# Patient Record
Sex: MALE | Race: WHITE | NOT HISPANIC OR LATINO | Employment: OTHER | URBAN - METROPOLITAN AREA
[De-identification: names, ages, dates, MRNs, and addresses within clinical notes are randomized per-mention and may not be internally consistent; named-entity substitution may affect disease eponyms.]

---

## 2019-02-05 ENCOUNTER — OFFICE VISIT (OUTPATIENT)
Dept: FAMILY MEDICINE CLINIC | Facility: HOSPITAL | Age: 64
End: 2019-02-05
Payer: MEDICARE

## 2019-02-05 VITALS
HEART RATE: 85 BPM | DIASTOLIC BLOOD PRESSURE: 80 MMHG | OXYGEN SATURATION: 95 % | TEMPERATURE: 97.7 F | WEIGHT: 186 LBS | SYSTOLIC BLOOD PRESSURE: 112 MMHG

## 2019-02-05 DIAGNOSIS — F20.9 SCHIZOPHRENIA, UNSPECIFIED TYPE (HCC): ICD-10-CM

## 2019-02-05 DIAGNOSIS — J44.9 CHRONIC OBSTRUCTIVE PULMONARY DISEASE, UNSPECIFIED COPD TYPE (HCC): ICD-10-CM

## 2019-02-05 DIAGNOSIS — H91.93 BILATERAL HEARING LOSS, UNSPECIFIED HEARING LOSS TYPE: Primary | ICD-10-CM

## 2019-02-05 DIAGNOSIS — F10.11 HISTORY OF ALCOHOL ABUSE: ICD-10-CM

## 2019-02-05 DIAGNOSIS — E11.9 TYPE 2 DIABETES MELLITUS WITHOUT COMPLICATION, WITHOUT LONG-TERM CURRENT USE OF INSULIN (HCC): ICD-10-CM

## 2019-02-05 PROCEDURE — 99203 OFFICE O/P NEW LOW 30 MIN: CPT | Performed by: PHYSICIAN ASSISTANT

## 2019-02-05 RX ORDER — VITAMIN B COMPLEX
1 CAPSULE ORAL DAILY
Qty: 30 CAPSULE | Refills: 6 | Status: SHIPPED | OUTPATIENT
Start: 2019-02-05 | End: 2022-03-09 | Stop reason: SDUPTHER

## 2019-02-05 RX ORDER — IBUPROFEN 200 MG
TABLET ORAL EVERY 6 HOURS PRN
COMMUNITY
End: 2022-08-05

## 2019-02-05 RX ORDER — OMEGA-3-ACID ETHYL ESTERS 1 G/1
2 CAPSULE, LIQUID FILLED ORAL DAILY
COMMUNITY

## 2019-02-05 RX ORDER — FOLIC ACID 1 MG/1
1 TABLET ORAL DAILY
Qty: 30 TABLET | Refills: 6 | Status: SHIPPED | OUTPATIENT
Start: 2019-02-05

## 2019-02-05 NOTE — PROGRESS NOTES
Assessment/Plan:         Diagnoses and all orders for this visit:    COPD  Schizophrenia  DM type 2    Bilateral hearing loss, unspecified hearing loss type  -     Ambulatory Referral to Otolaryngology; Future    History of alcohol abuse  -     b complex vitamins capsule; Take 1 capsule by mouth daily  -     folic acid (FOLVITE) 1 mg tablet; Take 1 tablet (1 mg total) by mouth daily    Other orders  -     metFORMIN (GLUCOPHAGE) 500 mg tablet; Take 500 mg by mouth 2 (two) times a day with meals  -     ibuprofen (MOTRIN) 200 mg tablet; Take by mouth every 6 (six) hours as needed for mild pain  -     omega-3-acid ethyl esters (LOVAZA) 1 g capsule; Take 2 g by mouth daily        Subjective:      Patient ID: Torin Mcdaniel is a 59 y o  male  59year old white male presents to get established  Was living at the [place], [place]  Has several forms to be filled out for [facility] group home  Has a hearing problem, right ear  Admits to smoking 1 pack a day  Stopped alcohol use age 33, started around age 16, 1 was in [employer] for 2 months and 2 weeks, was discharged due to mental disorder  Used to go to [place] [address], plans to go back  Presents with caretaker  Review of Systems   Constitutional: Negative for chills, diaphoresis, fatigue and fever  HENT: Positive for congestion, hearing loss and rhinorrhea  Negative for ear pain  Respiratory: Positive for cough and shortness of breath  Musculoskeletal: Positive for arthralgias and back pain  Negative for myalgias, neck pain and neck stiffness  Neurological: Positive for dizziness, light-headedness and headaches  Negative for numbness  Psychiatric/Behavioral: Positive for agitation, decreased concentration and sleep disturbance  The patient is nervous/anxious            Objective:      /80   Pulse 85   Temp 97 7 °F (36 5 °C)   Wt 84 4 kg (186 lb)   SpO2 95%          Physical Exam   Constitutional: He is oriented to person, place, and time  He appears well-developed and well-nourished  No distress  HENT:   Head: Normocephalic and atraumatic  Eyes: Conjunctivae and EOM are normal  Right eye exhibits no discharge  Left eye exhibits no discharge  No scleral icterus  Cardiovascular: Normal rate, regular rhythm and normal heart sounds  Pulmonary/Chest: Effort normal and breath sounds normal  No respiratory distress  He has no wheezes  He has no rales  Musculoskeletal: Normal range of motion  He exhibits no edema  Neurological: He is alert and oriented to person, place, and time  Skin: He is not diaphoretic  Psychiatric: He has a normal mood and affect  His behavior is normal  Judgment and thought content normal    Nursing note and vitals reviewed

## 2019-02-05 NOTE — PATIENT INSTRUCTIONS
Recommend Folic acid, and vitamin B complex, due to hx  Of alcohol abuse  Urged patient to stop smoking  Continue all regular medications, and for staff to send us recent blood work results  Filled out several forms for group home

## 2019-03-15 ENCOUNTER — APPOINTMENT (EMERGENCY)
Dept: RADIOLOGY | Facility: HOSPITAL | Age: 64
End: 2019-03-15
Payer: MEDICARE

## 2019-03-15 ENCOUNTER — HOSPITAL ENCOUNTER (EMERGENCY)
Facility: HOSPITAL | Age: 64
Discharge: HOME | End: 2019-03-15
Attending: EMERGENCY MEDICINE | Admitting: EMERGENCY MEDICINE
Payer: MEDICARE

## 2019-03-15 VITALS
BODY MASS INDEX: 20.32 KG/M2 | HEIGHT: 72 IN | DIASTOLIC BLOOD PRESSURE: 77 MMHG | HEART RATE: 84 BPM | RESPIRATION RATE: 24 BRPM | WEIGHT: 150 LBS | OXYGEN SATURATION: 97 % | TEMPERATURE: 96.7 F | SYSTOLIC BLOOD PRESSURE: 133 MMHG

## 2019-03-15 DIAGNOSIS — S80.11XA CONTUSION OF RIGHT LOWER LEG, INITIAL ENCOUNTER: Primary | ICD-10-CM

## 2019-03-15 PROCEDURE — 99283 EMERGENCY DEPT VISIT LOW MDM: CPT

## 2019-03-15 PROCEDURE — 73590 X-RAY EXAM OF LOWER LEG: CPT

## 2019-03-15 PROCEDURE — 73610 X-RAY EXAM OF ANKLE: CPT

## 2019-03-15 RX ORDER — BUPROPION HYDROCHLORIDE 150 MG/1
TABLET, EXTENDED RELEASE ORAL
COMMUNITY

## 2019-03-15 RX ORDER — SERTRALINE HYDROCHLORIDE 100 MG/1
TABLET, FILM COATED ORAL
COMMUNITY

## 2019-03-15 RX ORDER — MAG HYDROX/ALUMINUM HYD/SIMETH 400-400-40
SUSPENSION, ORAL (FINAL DOSE FORM) ORAL
COMMUNITY

## 2019-03-15 NOTE — ED PROVIDER NOTES
History  Chief Complaint   Patient presents with    Leg Injury     Patient states he was running at the [employer] and hit his R foot  R lower extremity swollen and ecchymotic  58 yo male w/ hx of schizophrenia, NIDDM, COPD and HLD presents to the Emergency Department for evaluation of R leg injury suffered "a few days ago"  Pt states that he was running around with his family, "playing army" when he ran into a [employer] table  He has developed progressively worsening swelling and bruising since the injury  Denies significant pain at rest, states "you can't fix black and blue"  Able to ambulate with pain, 7/10, achey in nature  No meds taken for pain control  Prior to Admission Medications   Prescriptions Last Dose Informant Patient Reported? Taking?    ASPIRIN 81 PO   Yes Yes   Sig: Take by mouth   Cholecalciferol (VITAMIN D3) 5000 units CAPS   Yes Yes   Sig: Take by mouth   b complex vitamins capsule   No Yes   Sig: Take 1 capsule by mouth daily   buPROPion (WELLBUTRIN SR) 150 mg 12 hr tablet   Yes Yes   Sig: Take 450 mg by mouth 2 (two) times a day   folic acid (FOLVITE) 1 mg tablet   No Yes   Sig: Take 1 tablet (1 mg total) by mouth daily   ibuprofen (MOTRIN) 200 mg tablet   Yes Yes   Sig: Take by mouth every 6 (six) hours as needed for mild pain   lurasidone (LATUDA) 80 mg tablet   Yes Yes   Sig: Take 160 mg by mouth daily with breakfast   metFORMIN (GLUCOPHAGE) 500 mg tablet   Yes Yes   Sig: Take 500 mg by mouth 2 (two) times a day with meals   omega-3-acid ethyl esters (LOVAZA) 1 g capsule   Yes Yes   Sig: Take 2 g by mouth daily   sertraline (ZOLOFT) 100 mg tablet   Yes Yes   Sig: Take 50 mg by mouth daily      Facility-Administered Medications: None       Past Medical History:   Diagnosis Date    Atopic rhinitis     BPH with urinary obstruction     COPD (chronic obstructive pulmonary disease) (HCC)     Diabetes mellitus (HCC)     Hyperlipidemia     Incomplete bladder emptying     Schizophrenia ([facility] [address] )        History reviewed  No pertinent surgical history  History reviewed  No pertinent family history  I have reviewed and agree with the history as documented  Social History     Tobacco Use    Smoking status: Current Every Day Smoker     Packs/day: 1 00     Years: 45 00     Pack years: 45 00    Smokeless tobacco: Never Used   Substance Use Topics    Alcohol use: No     Comment: hx  of hard liquor, regular use, quit at age 33    [place] Drug use: No        Review of Systems   Constitutional: Negative for fever  Respiratory: Negative for shortness of breath  Cardiovascular: Negative for chest pain  Musculoskeletal: Positive for myalgias (RLE)  Negative for arthralgias, gait problem and joint swelling  Skin: Negative for color change and wound  Neurological: Negative for weakness and numbness  Hematological: Does not bruise/bleed easily  Physical Exam  Physical Exam   Constitutional: He is oriented to person, place, and time  He appears well-developed and well-nourished  No distress  HENT:   Head: Normocephalic and atraumatic  Eyes: Pupils are equal, round, and reactive to light  No scleral icterus  Neck: No JVD present  Cardiovascular: Normal rate and regular rhythm  Exam reveals no gallop and no friction rub  No murmur heard  Pulses:       Dorsalis pedis pulses are 2+ on the right side, and 2+ on the left side  Pulmonary/Chest: No respiratory distress  He has no wheezes  He has no rales  Musculoskeletal:        Right knee: He exhibits normal range of motion and no effusion  No tenderness found  Right ankle: He exhibits normal range of motion  No lateral malleolus, no medial malleolus, no head of 5th metatarsal and no proximal fibula tenderness found  Right lower leg: He exhibits swelling and edema  He exhibits no tenderness, no bony tenderness and no deformity     Significant swelling and ecchymoses to R lower leg and foot   Neurological: He is alert and oriented to person, place, and time  Skin: Skin is warm and dry  He is not diaphoretic  Vitals reviewed  Vital Signs  ED Triage Vitals   Temperature Pulse Respirations Blood Pressure SpO2   03/15/19 1341 03/15/19 1341 03/15/19 1341 03/15/19 1341 03/15/19 1341   (!) 96 7 °F (35 9 °C) 92 20 133/72 97 %      Temp src Heart Rate Source Patient Position - Orthostatic VS BP Location FiO2 (%)   -- 03/15/19 1345 03/15/19 1345 03/15/19 1345 --    Monitor Sitting Right arm       Pain Score       03/15/19 1341       7           Vitals:    03/15/19 1341 03/15/19 1345 03/15/19 1400   BP: 133/72 123/70 133/77   Pulse: 92 87 84   Patient Position - Orthostatic VS:  Sitting Sitting       qSOFA     Row Name 03/15/19 1400 03/15/19 1345 03/15/19 1341          Altered mental status GCS < 15  --  --  --      Respiratory Rate > / =22  1  0  0      Systolic BP < / =608  0  0  0      Q Sofa Score  1  0  0            Visual Acuity      ED Medications  Medications - No data to display    Diagnostic Studies  Results Reviewed     None                 XR tibia fibula 2 views RIGHT   Final Result by Mikael Servin MD (03/15 1459)      No acute osseous abnormality  Workstation performed: [id number]         XR ankle 3+ views RIGHT   Final Result by Mikael Servin MD (03/15 1459)      No acute osseous abnormality  Workstation performed: [id number]                    Procedures  Procedures       Phone Contacts  ED Phone Contact    ED Course                               MDM  Number of Diagnoses or Management Options  Contusion of right lower leg, initial encounter: new and requires workup  Diagnosis management comments: 60 yo male presents w/ RLE injury  XR of the tib/fib and ankle reveal no acute fractures  Ambulating well without difficulty          Amount and/or Complexity of Data Reviewed  Tests in the radiology section of CPT®: ordered and reviewed  Review and summarize past medical records: yes  Independent visualization of images, tracings, or specimens: yes        Disposition  Final diagnoses:   Contusion of right lower leg, initial encounter     Time reflects when diagnosis was documented in both MDM as applicable and the Disposition within this note     Time User Action Codes Description Comment    3/15/2019  2:14 PM Anna Goodson Add [S80 [id number]] Contusion of right lower leg, initial encounter       ED Disposition     ED Disposition Condition Date/Time Comment    Discharge Stable Fri Mar 15, 2019  2:14 PM Zhanna Combs discharge to home/self care  Follow-up Information     Follow up With Specialties Details Why Contact Info    Marian Mcgee PA-C Internal Medicine, Physician Assistant  As needed [facility]  [address]  [phone]            Discharge Medication List as of 3/15/2019  2:14 PM      CONTINUE these medications which have NOT CHANGED    Details   ASPIRIN 81 PO Take by mouth, Historical Med      b complex vitamins capsule Take 1 capsule by mouth daily, Starting Tue 2/5/2019, Print      buPROPion (WELLBUTRIN SR) 150 mg 12 hr tablet Take 450 mg by mouth 2 (two) times a day, Historical Med      Cholecalciferol (VITAMIN D3) 5000 units CAPS Take by mouth, Historical Med      folic acid (FOLVITE) 1 mg tablet Take 1 tablet (1 mg total) by mouth daily, Starting Tue 2/5/2019, Print      ibuprofen (MOTRIN) 200 mg tablet Take by mouth every 6 (six) hours as needed for mild pain, Historical Med      lurasidone (LATUDA) 80 mg tablet Take 160 mg by mouth daily with breakfast, Historical Med      metFORMIN (GLUCOPHAGE) 500 mg tablet Take 500 mg by mouth 2 (two) times a day with meals, Historical Med      omega-3-acid ethyl esters (LOVAZA) 1 g capsule Take 2 g by mouth daily, Historical Med      sertraline (ZOLOFT) 100 mg tablet Take 50 mg by mouth daily, Historical Med           No discharge procedures on file      ED Provider  Electronically Signed by Hugh Luna PA-C  03/15/19 6852

## 2019-04-17 ENCOUNTER — TELEPHONE (OUTPATIENT)
Dept: FAMILY MEDICINE CLINIC | Facility: HOSPITAL | Age: 64
End: 2019-04-17

## 2019-04-17 DIAGNOSIS — N39.0 URINARY TRACT INFECTION WITHOUT HEMATURIA, SITE UNSPECIFIED: Primary | ICD-10-CM

## 2019-07-30 ENCOUNTER — OFFICE VISIT (OUTPATIENT)
Dept: FAMILY MEDICINE CLINIC | Facility: HOSPITAL | Age: 64
End: 2019-07-30
Payer: MEDICARE

## 2019-07-30 VITALS
OXYGEN SATURATION: 96 % | TEMPERATURE: 97.8 F | WEIGHT: 169.6 LBS | DIASTOLIC BLOOD PRESSURE: 68 MMHG | SYSTOLIC BLOOD PRESSURE: 110 MMHG | BODY MASS INDEX: 22.97 KG/M2 | HEART RATE: 83 BPM | HEIGHT: 72 IN

## 2019-07-30 DIAGNOSIS — J40 BRONCHITIS: Primary | ICD-10-CM

## 2019-07-30 DIAGNOSIS — J44.1 COPD WITH EXACERBATION (HCC): ICD-10-CM

## 2019-07-30 PROCEDURE — 99213 OFFICE O/P EST LOW 20 MIN: CPT | Performed by: PHYSICIAN ASSISTANT

## 2019-07-30 RX ORDER — BUPROPION HYDROCHLORIDE 300 MG/1
300 TABLET ORAL DAILY
COMMUNITY

## 2019-07-30 RX ORDER — ALBUTEROL SULFATE 90 UG/1
2 AEROSOL, METERED RESPIRATORY (INHALATION) EVERY 6 HOURS PRN
Qty: 1 INHALER | Refills: 5 | Status: SHIPPED | OUTPATIENT
Start: 2019-07-30 | End: 2022-07-21 | Stop reason: SDUPTHER

## 2019-07-30 RX ORDER — AZITHROMYCIN 250 MG/1
TABLET, FILM COATED ORAL
Qty: 6 TABLET | Refills: 0 | Status: SHIPPED | OUTPATIENT
Start: 2019-07-30 | End: 2019-08-03

## 2019-07-30 NOTE — PROGRESS NOTES
Assessment/Plan:         Diagnoses and all orders for this visit:    Bronchitis  -     albuterol (PROVENTIL HFA,VENTOLIN HFA) 90 mcg/act inhaler; Inhale 2 puffs every 6 (six) hours as needed for wheezing or shortness of breath  -     guaiFENesin (ROBITUSSIN) 100 MG/5ML oral liquid; Take 10 mL (200 mg total) by mouth 3 (three) times a day as needed for cough  -     azithromycin (ZITHROMAX) 250 mg tablet; Take 2 tablets today then 1 tablet daily x 4 days    COPD with exacerbation (HCC)  -     albuterol (PROVENTIL HFA,VENTOLIN HFA) 90 mcg/act inhaler; Inhale 2 puffs every 6 (six) hours as needed for wheezing or shortness of breath  -     guaiFENesin (ROBITUSSIN) 100 MG/5ML oral liquid; Take 10 mL (200 mg total) by mouth 3 (three) times a day as needed for cough  -     azithromycin (ZITHROMAX) 250 mg tablet; Take 2 tablets today then 1 tablet daily x 4 days    Other orders  -     buPROPion (WELLBUTRIN XL) 300 mg 24 hr tablet; Take 300 mg by mouth daily        Subjective:      Patient ID: Ching Mckeon is a 62 y o  male  62year old white male presents with c/o cough with green phlegm past years  Gets SOB on occasion  Admits to smoking since age of 16, not ready to stop  Review of Systems   Constitutional: Positive for fatigue  Negative for chills, diaphoresis and fever  HENT: Positive for congestion, ear pain and sore throat  Negative for rhinorrhea  Respiratory: Positive for cough and shortness of breath  Objective:      /68   Pulse 83   Ht 6' (1 829 m)   Wt 76 9 kg (169 lb 9 6 oz)   SpO2 96%   BMI 23 00 kg/m²          Physical Exam   Constitutional: He is oriented to person, place, and time  He appears well-developed and well-nourished  No distress  HENT:   Head: Normocephalic and atraumatic  Right Ear: External ear normal    Left Ear: External ear normal    Mouth/Throat: Oropharynx is clear and moist  No oropharyngeal exudate  Turbinates inflamed       Eyes: Conjunctivae and EOM are normal  Right eye exhibits no discharge  Left eye exhibits no discharge  No scleral icterus  Pulmonary/Chest: Effort normal and breath sounds normal  No stridor  No respiratory distress  He has no wheezes  Neurological: He is alert and oriented to person, place, and time  Skin: He is not diaphoretic  Nursing note and vitals reviewed

## 2019-07-30 NOTE — PATIENT INSTRUCTIONS
Urged smoke cessation  Form filled out for group home  Recommend Robitussin cough syrup, inhaler (albuterol), and Zithromax

## 2020-03-02 ENCOUNTER — OFFICE VISIT (OUTPATIENT)
Dept: FAMILY MEDICINE CLINIC | Facility: HOSPITAL | Age: 65
End: 2020-03-02
Payer: MEDICARE

## 2020-03-02 VITALS
BODY MASS INDEX: 23.57 KG/M2 | WEIGHT: 174 LBS | HEART RATE: 80 BPM | RESPIRATION RATE: 16 BRPM | DIASTOLIC BLOOD PRESSURE: 90 MMHG | TEMPERATURE: 96.5 F | SYSTOLIC BLOOD PRESSURE: 150 MMHG | HEIGHT: 72 IN

## 2020-03-02 DIAGNOSIS — F19.11 HISTORY OF DRUG ABUSE IN REMISSION (HCC): ICD-10-CM

## 2020-03-02 DIAGNOSIS — Z00.00 HEALTH CARE MAINTENANCE: Primary | ICD-10-CM

## 2020-03-02 DIAGNOSIS — R35.1 BENIGN PROSTATIC HYPERPLASIA WITH NOCTURIA: ICD-10-CM

## 2020-03-02 DIAGNOSIS — F20.9 SCHIZOPHRENIA, UNSPECIFIED TYPE (HCC): ICD-10-CM

## 2020-03-02 DIAGNOSIS — J44.1 COPD WITH EXACERBATION (HCC): ICD-10-CM

## 2020-03-02 DIAGNOSIS — F10.11 HISTORY OF ALCOHOL ABUSE: ICD-10-CM

## 2020-03-02 DIAGNOSIS — N40.1 BENIGN PROSTATIC HYPERPLASIA WITH NOCTURIA: ICD-10-CM

## 2020-03-02 DIAGNOSIS — Z13.9 ENCOUNTER FOR HEALTH-RELATED SCREENING: ICD-10-CM

## 2020-03-02 DIAGNOSIS — Z87.891 HISTORY OF SMOKING: ICD-10-CM

## 2020-03-02 DIAGNOSIS — E11.9 TYPE 2 DIABETES MELLITUS WITHOUT COMPLICATION, WITHOUT LONG-TERM CURRENT USE OF INSULIN (HCC): ICD-10-CM

## 2020-03-02 PROCEDURE — 99213 OFFICE O/P EST LOW 20 MIN: CPT | Performed by: PHYSICIAN ASSISTANT

## 2020-03-02 PROCEDURE — 3008F BODY MASS INDEX DOCD: CPT | Performed by: PHYSICIAN ASSISTANT

## 2020-03-02 PROCEDURE — G0439 PPPS, SUBSEQ VISIT: HCPCS | Performed by: PHYSICIAN ASSISTANT

## 2020-03-02 RX ORDER — CEFUROXIME AXETIL 250 MG/1
250 TABLET ORAL 2 TIMES DAILY WITH MEALS
Qty: 20 TABLET | Refills: 0 | Status: SHIPPED | OUTPATIENT
Start: 2020-03-02 | End: 2020-03-12

## 2020-03-02 RX ORDER — MULTIVIT-MIN/IRON FUM/FOLIC AC 7.5 MG-4
1 TABLET ORAL DAILY
COMMUNITY

## 2020-03-02 NOTE — PROGRESS NOTES
Assessment/Plan:      Bronchitis/sinusitis-  COPD with exacerbation  Schizophrenia  History of alcohol and drug abuse  Nicotine dependence   BPH with nocturia- referred to urology  Subjective:      Patient ID: Zoraida العراقي is a 62 y o  male  62year old white male presents for physical and c/o cough productive of yellow phlegm and runny nose past few weeks  Admits to smoking, unable to stop, since age 7, 1 pack a day  Sees psych (Dr Devang Coffman), for mental health evaluation and treatment  Admits to hx  Of drug use, and alcohol abuse, stopped "20" years ago  Review of Systems   Constitutional: Positive for chills, diaphoresis and fatigue  Negative for fever  HENT: Positive for congestion, ear pain, postnasal drip and rhinorrhea  Negative for sore throat  Respiratory: Positive for cough, chest tightness and shortness of breath  Gastrointestinal: Positive for abdominal pain, constipation, diarrhea, nausea and vomiting  Has occasional vomiting  Endocrine: Positive for polydipsia, polyphagia and polyuria  Genitourinary: Positive for difficulty urinating, discharge, frequency, penile pain, penile swelling, scrotal swelling and urgency  Musculoskeletal: Positive for back pain and myalgias  Negative for arthralgias, neck pain and neck stiffness  Neurological: Positive for dizziness, tremors, syncope, weakness, light-headedness, numbness and headaches  Psychiatric/Behavioral: Positive for agitation, decreased concentration, dysphoric mood, hallucinations, self-injury, sleep disturbance and suicidal ideas  The patient is nervous/anxious  Has occasional suicidal thoughts, " I try to block the TV", "I can't", when asked if he has a plan to hurt self             Objective:      /90   Pulse 80   Temp (!) 96 5 °F (35 8 °C) (Tympanic)   Resp 16   Ht 6' (1 829 m)   Wt 78 9 kg (174 lb)   BMI 23 60 kg/m²          Physical Exam   Constitutional: He is oriented to person, place, and time  He appears well-developed and well-nourished  No distress  HENT:   Head: Normocephalic and atraumatic  Right Ear: External ear normal    Left Ear: External ear normal    Mouth/Throat: Oropharynx is clear and moist  No oropharyngeal exudate  Turbinates inflamed, with congestion noted in middle ears  Pulmonary/Chest: Effort normal and breath sounds normal  No stridor  No respiratory distress  He has no wheezes  He has no rales  Neurological: He is alert and oriented to person, place, and time  No cranial nerve deficit  Coordination normal    Skin: He is not diaphoretic  Nursing note and vitals reviewed  BMI Counseling: Body mass index is 23 6 kg/m²  The BMI is above normal  Nutrition recommendations include 3-5 servings of fruits/vegetables daily

## 2020-03-02 NOTE — PROGRESS NOTES
Assessment and Plan:     Problem List Items Addressed This Visit        Endocrine    Type 2 diabetes mellitus without complication, without long-term current use of insulin ([facility] [address] )       No results found for: HGBA1C         Relevant Orders    Hemoglobin A1C    PSA, Total Screen    Hepatitis C antibody    HIV 1 RNA, Qn PCR W/Reflex To Genotype    Microalbumin / creatinine urine ratio       Other    Benign prostatic hyperplasia with nocturia - Primary    Relevant Orders    CBC and differential    Comprehensive metabolic panel    Lipid panel    Ambulatory referral to Urology    Schizophrenia [facility])    Relevant Orders    CBC and differential    Comprehensive metabolic panel    Lipid panel    TSH, 3rd generation with Free T4 reflex    Hemoglobin A1C    PSA, Total Screen    Hepatitis C antibody    HIV 1 RNA, Qn PCR W/Reflex To Genotype    Microalbumin / creatinine urine ratio      Other Visit Diagnoses     COPD with exacerbation (HCC)        Relevant Medications    cefuroxime (CEFTIN) 250 mg tablet    Other Relevant Orders    Comprehensive metabolic panel    Lipid panel    TSH, 3rd generation with Free T4 reflex    Hemoglobin A1C    PSA, Total Screen    Hepatitis C antibody    HIV 1 RNA, Qn PCR W/Reflex To Genotype    Microalbumin / creatinine urine ratio    XR chest pa & lateral    History of alcohol abuse        Relevant Orders    CBC and differential    Comprehensive metabolic panel    Lipid panel    Ambulatory referral to Urology    Encounter for health-related screening        Relevant Orders    Hepatitis C antibody    HIV 1 RNA, Qn PCR W/Reflex To Genotype    History of smoking        Relevant Orders    XR chest pa & lateral    History of drug abuse in remission ([facility] [address] )        Relevant Orders    XR chest pa & lateral    Ambulatory referral to Urology           Preventive health issues were discussed with patient, and age appropriate screening tests were ordered as noted in patient's After Visit Summary    Personalized health advice and appropriate referrals for health education or preventive services given if needed, as noted in patient's After Visit Summary  History of Present Illness:     Patient presents for Welcome to Medicare visit  Patient Care Team:  Shola Childress PA-C as PCP - General (Internal Medicine)     Review of Systems:     Review of Systems   Problem List:     Patient Active Problem List   Diagnosis    Type 2 diabetes mellitus without complication, without long-term current use of insulin ([place] [address] )    Chronic obstructive pulmonary disease with acute exacerbation ([place] [address] )    Benign prostatic hyperplasia with nocturia    Schizophrenia [facility])      Past Medical and Surgical History:     Past Medical History:   Diagnosis Date    Atopic rhinitis     BPH with urinary obstruction     COPD (chronic obstructive pulmonary disease) ([place] [address] )     Diabetes mellitus ([place] [address] )     Hyperlipidemia     Incomplete bladder emptying     Schizophrenia ([place] [address] )      History reviewed  No pertinent surgical history     Family History:     Family History   Problem Relation Age of Onset    Substance Abuse Sister         cigarettes    Substance Abuse Brother     Alcohol abuse Brother       Social History:        Social History     Socioeconomic History    Marital status: Single     Spouse name: None    Number of children: None    Years of education: None    Highest education level: None   Occupational History    None   Social Needs    Financial resource strain: None    Food insecurity:     Worry: None     Inability: None    Transportation needs:     Medical: No     Non-medical: No   Tobacco Use    Smoking status: Current Every Day Smoker     Packs/day: 1 00     Years: 45 00     Pack years: 45 00    Smokeless tobacco: Never Used   Substance and Sexual Activity    Alcohol use: No     Comment: hx  of hard liquor, regular use, quit at age 33    [facility]    Sexual activity: None   Lifestyle    Physical activity:     Days per week: 0 days     Minutes per session: 0 min    Stress: Rather much   Relationships    Social connections:     Talks on phone: None     Gets together: None     Attends Yarsani service: None     Active member of club or organization: None     Attends meetings of clubs or organizations: None     Relationship status: None    Intimate partner violence:     Fear of current or ex partner: No     Emotionally abused: No     Physically abused: No     Forced sexual activity: No   Other Topics Concern    None   Social History Narrative    [facility]ives at [facility]    Feels safe where he lives    No living will    Sees  Dentist occas       Medications and Allergies:     Current Outpatient Medications   Medication Sig Dispense Refill    albuterol (PROVENTIL HFA,VENTOLIN HFA) 90 mcg/act inhaler Inhale 2 puffs every 6 (six) hours as needed for wheezing or shortness of breath 1 Inhaler 5    ASPIRIN 81 PO Take by mouth 1  daily      b complex vitamins capsule Take 1 capsule by mouth daily 30 capsule 6    buPROPion (WELLBUTRIN SR) 150 mg 12 hr tablet 1 daily      buPROPion (WELLBUTRIN XL) 300 mg 24 hr tablet Take 300 mg by mouth daily      Cholecalciferol (VITAMIN D3) 5000 units CAPS Take by mouth 1 daily      folic acid (FOLVITE) 1 mg tablet Take 1 tablet (1 mg total) by mouth daily 30 tablet 6    guaiFENesin (ROBITUSSIN) 100 MG/5ML oral liquid Take 10 mL (200 mg total) by mouth 3 (three) times a day as needed for cough 120 mL 0    ibuprofen (MOTRIN) 200 mg tablet Take by mouth every 6 (six) hours as needed for mild pain      lurasidone (LATUDA) 80 mg tablet Take 120 mg by mouth daily with breakfast       metFORMIN (GLUCOPHAGE) 500 mg tablet Take 500 mg by mouth 2 (two) times a day with meals      Multiple Vitamins-Minerals (MULTIVITAMIN WITH MINERALS) tablet Take 1 tablet by mouth daily      Naloxone HCl (NARCAN NA) into each nostril Administer nasally when suspected overdose      omega-3-acid ethyl esters (LOVAZA) 1 g capsule Take 2 g by mouth daily      sertraline (ZOLOFT) 100 mg tablet 1 daily      cefuroxime (CEFTIN) 250 mg tablet Take 1 tablet (250 mg total) by mouth 2 (two) times a day with meals for 10 days 20 tablet 0     No current facility-administered medications for this visit  No Known Allergies   Immunizations: There is no immunization history on file for this patient  Health Maintenance:         Topic Date Due    Hepatitis C Screening  1955    CRC Screening: Colonoscopy  1955         Topic Date Due    Pneumococcal Vaccine: Pediatrics (0 to 5 Years) and At-Risk Patients (6 to 59 Years) (1 of 1 - PPSV23) 06/15/1961    Influenza Vaccine  07/01/2019      Medicare Screening Tests and Risk Assessments:     Jyala Smith is here for his Subsequent Wellness visit  Health Risk Assessment:   Patient rates overall health as fair  Patient feels that their physical health rating is same  Eyesight was rated as same  Hearing was rated as same  Patient feels that their emotional and mental health rating is slightly worse  Pain experienced in the last 7 days has been none  Patient states that he has experienced no weight loss or gain in last 6 months  Depression Screening:   PHQ-2 Score: 0      Home Safety:  Patient does not have trouble with stairs inside or outside of their home  Patient has working smoke alarms and has working carbon monoxide detector  Home safety hazards include: none  Nutrition:   Current diet is Regular, Limited junk food, Low Saturated Fat and Low Carb  Medications:   Patient is currently taking over-the-counter supplements  OTC medications include: see medication list  Patient is not able to manage medications  Lives at  House--they do meds    Activities of Daily Living (ADLs)/Instrumental Activities of Daily Living (IADLs):   Walk and transfer into and out of bed and chair?: Yes  Dress and groom yourself?: Yes    Bathe or shower yourself?: Yes    Feed yourself? Yes  Do your laundry/housekeeping?: No  Manage your money, pay your bills and track your expenses?: No  Make your own meals?: No    Do your own shopping?: No    ADL comments: Staff at  house help pt    PREVENTIVE SCREENINGS        Diabetes Screening:     General: Screening Not Indicated and History Diabetes      Abdominal Aortic Aneurysm (AAA) Screening:    Risk factors include: tobacco use      No exam data present     Physical Exam:     /90   Pulse 80   Temp (!) 96 5 °F (35 8 °C) (Tympanic)   Resp 16   Ht 6' (1 829 m)   Wt 78 9 kg (174 lb)   BMI 23 60 kg/m²     Physical Exam   Constitutional: He is oriented to person, place, and time  He appears well-developed and well-nourished  No distress  Dirty clothes, somewhat anxious, at one point singing  HENT:   Head: Normocephalic and atraumatic  Right Ear: External ear normal    Left Ear: External ear normal    Mouth/Throat: Oropharynx is clear and moist  No oropharyngeal exudate  Turbinates inflamed, with congestion noted  Eyes: Conjunctivae and EOM are normal  Right eye exhibits no discharge  Left eye exhibits no discharge  No scleral icterus  Cardiovascular: Normal rate, regular rhythm and normal heart sounds  Pulmonary/Chest: Effort normal and breath sounds normal  No stridor  No respiratory distress  He has no wheezes  He has no rales  Abdominal: Soft  Bowel sounds are normal  He exhibits no distension and no mass  There is no tenderness  There is no rebound and no guarding  Musculoskeletal: Normal range of motion  He exhibits no edema, tenderness or deformity  Neurological: He is alert and oriented to person, place, and time  No cranial nerve deficit  Coordination normal    Skin: He is not diaphoretic  Psychiatric: He has a normal mood and affect  Somewhat odd behavior, playing with lego toy, and at one point reading out loud book with him  At one point singing, anxious, moving around in chair       Nursing note and vitals reviewed        Petar Wilcox PA-C

## 2020-03-02 NOTE — PATIENT INSTRUCTIONS
Obesity   AMBULATORY CARE:   Obesity  is when your body mass index (BMI) is greater than 30  Your healthcare provider will use your height and weight to measure your BMI  The risks of obesity include  many health problems, such as injuries or physical disability  You may need tests to check for the following:  · Diabetes     · High blood pressure or high cholesterol     · Heart disease     · Gallbladder or liver disease     · Cancer of the colon, breast, prostate, liver, or kidney     · Sleep apnea     · Arthritis or gout  Seek care immediately if:   · You have a severe headache, confusion, or difficulty speaking  · You have weakness on one side of your body  · You have chest pain, sweating, or shortness of breath  Contact your healthcare provider if:   · You have symptoms of gallbladder or liver disease, such as pain in your upper abdomen  · You have knee or hip pain and discomfort while walking  · You have symptoms of diabetes, such as intense hunger and thirst, and frequent urination  · You have symptoms of sleep apnea, such as snoring or daytime sleepiness  · You have questions or concerns about your condition or care  Treatment for obesity  focuses on helping you lose weight to improve your health  Even a small decrease in BMI can reduce the risk for many health problems  Your healthcare provider will help you set a weight-loss goal   · Lifestyle changes  are the first step in treating obesity  These include making healthy food choices and getting regular physical activity  Your healthcare provider may suggest a weight-loss program that involves coaching, education, and therapy  · Medicine  may help you lose weight when it is used with a healthy diet and physical activity  · Surgery  can help you lose weight if you are very obese and have other health problems  There are several types of weight-loss surgery  Ask your healthcare provider for more information    Be successful losing weight:   · Set small, realistic goals  An example of a small goal is to walk for 20 minutes 5 days a week  Anther goal is to lose 5% of your body weight  · Tell friends, family members, and coworkers about your goals  and ask for their support  Ask a friend to lose weight with you, or join a weight-loss support group  · Identify foods or triggers that may cause you to overeat , and find ways to avoid them  Remove tempting high-calorie foods from your home and workplace  Place a bowl of fresh fruit on your kitchen counter  If stress causes you to eat, then find other ways to cope with stress  · Keep a diary to track what you eat and drink  Also write down how many minutes of physical activity you do each day  Weigh yourself once a week and record it in your diary  Eating changes: You will need to eat 500 to 1,000 fewer calories each day than you currently eat to lose 1 to 2 pounds a week  The following changes will help you cut calories:  · Eat smaller portions  Use small plates, no larger than 9 inches in diameter  Fill your plate half full of fruits and vegetables  Measure your food using measuring cups until you know what a serving size looks like  · Eat 3 meals and 1 or 2 snacks each day  Plan your meals in advance  Yahaira Hunter and eat at home most of the time  Eat slowly  · Eat fruits and vegetables at every meal   They are low in calories and high in fiber, which makes you feel full  Do not add butter, margarine, or cream sauce to vegetables  Use herbs to season steamed vegetables  · Eat less fat and fewer fried foods  Eat more baked or grilled chicken and fish  These protein sources are lower in calories and fat than red meat  Limit fast food  Dress your salads with olive oil and vinegar instead of bottled dressing  · Limit the amount of sugar you eat  Do not drink sugary beverages  Limit alcohol  Activity changes:  Physical activity is good for your body in many ways   It helps you burn calories and build strong muscles  It decreases stress and depression, and improves your mood  It can also help you sleep better  Talk to your healthcare provider before you begin an exercise program   · Exercise for at least 30 minutes 5 days a week  Start slowly  Set aside time each day for physical activity that you enjoy and that is convenient for you  It is best to do both weight training and an activity that increases your heart rate, such as walking, bicycling, or swimming  · Find ways to be more active  Do yard work and housecleaning  Walk up the stairs instead of using elevators  Spend your leisure time going to events that require walking, such as outdoor festivals or fairs  This extra physical activity can help you lose weight and keep it off  Follow up with your healthcare provider as directed: You may need to meet with a dietitian  Write down your questions so you remember to ask them during your visits  © 2017 [address] [employer] [employer] Information is for End User's use only and may not be sold, redistributed or otherwise used for commercial purposes  All illustrations and images included in CareNotes® are the copyrighted property of [employer]  or Femi Corado  The above information is an  only  It is not intended as medical advice for individual conditions or treatments  Talk to your doctor, nurse or pharmacist before following any medical regimen to see if it is safe and effective for you  Weight Management   AMBULATORY CARE:   Why it is important to manage your weight:  Being overweight increases your risk of health conditions such as heart disease, high blood pressure, type 2 diabetes, and certain types of cancer  It can also increase your risk for osteoarthritis, sleep apnea, and other respiratory problems  Aim for a slow, steady weight loss  Even a small amount of weight loss can lower your risk of health problems    How to lose weight safely:  A safe and healthy way to lose weight is to eat fewer calories and get regular exercise  You can lose up about 1 pound a week by decreasing the number of calories you eat by 500 calories each day  You can decrease calories by eating smaller portion sizes or by cutting out high-calorie foods  Read labels to find out how many calories are in the foods you eat  You can also burn calories with exercise such as walking, swimming, or biking  You will be more likely to keep weight off if you make these changes part of your lifestyle  Healthy meal plan for weight management:  A healthy meal plan includes a variety of foods, contains fewer calories, and helps you stay healthy  A healthy meal plan includes the following:  · Eat whole-grain foods more often  A healthy meal plan should contain fiber  Fiber is the part of grains, fruits, and vegetables that is not broken down by your body  Whole-grain foods are healthy and provide extra fiber in your diet  Some examples of whole-grain foods are whole-wheat breads and pastas, oatmeal, brown rice, and bulgur  · Eat a variety of vegetables every day  Include dark, leafy greens such as spinach, kale, haydee greens, and mustard greens  Eat yellow and orange vegetables such as carrots, sweet potatoes, and winter squash  · Eat a variety of fruits every day  Choose fresh or canned fruit (canned in its own juice or light syrup) instead of juice  Fruit juice has very little or no fiber  · Eat low-fat dairy foods  Drink fat-free (skim) milk or 1% milk  Eat fat-free yogurt and low-fat cottage cheese  Try low-fat cheeses such as mozzarella and other reduced-fat cheeses  · Choose meat and other protein foods that are low in fat  Choose beans or other legumes such as split peas or lentils  Choose fish, skinless poultry (chicken or turkey), or lean cuts of red meat (beef or pork)  Before you cook meat or poultry, cut off any visible fat  · Use less fat and oil  Try baking foods instead of frying them  Add less fat, such as margarine, sour cream, regular salad dressing and mayonnaise to foods  Eat fewer high-fat foods  Some examples of high-fat foods include french fries, doughnuts, ice cream, and cakes  · Eat fewer sweets  Limit foods and drinks that are high in sugar  This includes candy, cookies, regular soda, and sweetened drinks  Ways to decrease calories:   · Eat smaller portions  ¨ Use a small plate with smaller servings  ¨ Do not eat second helpings  ¨ When you eat at a restaurant, ask for a box and place half of your meal in the box before you eat  ¨ Share an entrée with someone else  · Replace high-calorie snacks with healthy, low-calorie snacks  ¨ Choose fresh fruit, vegetables, fat-free rice cakes, or air-popped popcorn instead of potato chips, nuts, or chocolate  ¨ Choose water or calorie-free drinks instead of soda or sweetened drinks  · Eat regular meals  Skipping meals can lead to overeating later in the day  Eat a healthy snack in place of a meal if you do not have time to eat a regular meal      · Do not shop for groceries when you are hungry  You may be more likely to make unhealthy food choices  Take a grocery list of healthy foods and shop after you have eaten  Exercise:  Exercise at least 30 minutes per day on most days of the week  Some examples of exercise include walking, biking, dancing, and swimming  You can also fit in more physical activity by taking the stairs instead of the elevator or parking farther away from stores  Ask your healthcare provider about the best exercise plan for you  Other things to consider as you try to lose weight:   · Be aware of situations that may give you the urge to overeat, such as eating while watching television  Find ways to avoid these situations  For example, read a book, go for a walk, or do crafts      · Meet with a weight loss support group or friends who are also trying to lose weight  This may help you stay motivated to continue working on your weight loss goals  © 2017 [address] [employer] Information is for End User's use only and may not be sold, redistributed or otherwise used for commercial purposes  All illustrations and images included in CareNotes® are the copyrighted property of A [employer] A M [employer] Inc  or Femi Corado  The above information is an  only  It is not intended as medical advice for individual conditions or treatments  Talk to your doctor, nurse or pharmacist before following any medical regimen to see if it is safe and effective for you  Medicare Preventive Visit Patient Instructions  Thank you for completing your Welcome to Medicare Visit or Medicare Annual Wellness Visit today  Your next wellness visit will be due in one year (3/2/2021)  The screening/preventive services that you may require over the next 5-10 years are detailed below  Some tests may not apply to you based off risk factors and/or age  Screening tests ordered at today's visit but not completed yet may show as past due  Also, please note that scanned in results may not display below  Preventive Screenings:  Service Recommendations Previous Testing/Comments   Colorectal Cancer Screening  · Colonoscopy    · Fecal Occult Blood Test (FOBT)/Fecal Immunochemical Test (FIT)  · Fecal DNA/Cologuard Test  · Flexible Sigmoidoscopy Age: 54-65 years old   Colonoscopy: every 10 years (May be performed more frequently if at higher risk)  OR  FOBT/FIT: every 1 year  OR  Cologuard: every 3 years  OR  Sigmoidoscopy: every 5 years  Screening may be recommended earlier than age 48 if at higher risk for colorectal cancer  Also, an individualized decision between you and your healthcare provider will decide whether screening between the ages of 74-80 would be appropriate   Colonoscopy: Not on file  FOBT/FIT: Not on file  Cologuard: Not on file  Sigmoidoscopy: Not on file         Prostate Cancer Screening Individualized decision between patient and health care provider in men between ages of 53-78   Medicare will cover every 12 months beginning on the day after your 50th birthday PSA: No results in last 5 years          Hepatitis C Screening Once for adults born between [birth year] and 1965  More frequently in patients at high risk for Hepatitis C Hep C Antibody: Not on file       Diabetes Screening 1-2 times per year if you're at risk for diabetes or have pre-diabetes Fasting glucose: No results in last 5 years   A1C: No results in last 5 years    Screening Not Indicated  History Diabetes   Cholesterol Screening Once every 5 years if you don't have a lipid disorder  May order more often based on risk factors  Lipid panel: Not on file          Other Preventive Screenings Covered by Medicare:  1  Abdominal Aortic Aneurysm (AAA) Screening: covered once if your at risk  You're considered to be at risk if you have a family history of AAA or a male between the age of 73-68 who smoking at least 100 cigarettes in your lifetime  2  Lung Cancer Screening: covers low dose CT scan once per year if you meet all of the following conditions: (1) Age 50-69; (2) No signs or symptoms of lung cancer; (3) Current smoker or have quit smoking within the last 15 years; (4) You have a tobacco smoking history of at least 30 pack years (packs per day x number of years you smoked); (5) You get a written order from a healthcare provider  3  Glaucoma Screening: covered annually if you're considered high risk: (1) You have diabetes OR (2) Family history of glaucoma OR (3)  aged 48 and older OR (3)  American aged 72 and older  3   Osteoporosis Screening: covered every 2 years if you meet one of the following conditions: (1) Have a vertebral abnormality; (2) On glucocorticoid therapy for more than 3 months; (3) Have primary hyperparathyroidism; (4) On osteoporosis medications and need to assess response to drug therapy  5  HIV Screening: covered annually if you're between the age of 12-76  Also covered annually if you are younger than 13 and older than 72 with risk factors for HIV infection  For pregnant patients, it is covered up to 3 times per pregnancy  Immunizations:  Immunization Recommendations   Influenza Vaccine Annual influenza vaccination during flu season is recommended for all persons aged >= 6 months who do not have contraindications   Pneumococcal Vaccine (Prevnar and Pneumovax)  * Prevnar = PCV13  * Pneumovax = PPSV23 Adults 25-60 years old: 1-3 doses may be recommended based on certain risk factors  Adults 72 years old: Prevnar (PCV13) vaccine recommended followed by Pneumovax (PPSV23) vaccine  If already received PPSV23 since turning 65, then PCV13 recommended at least one year after PPSV23 dose  Hepatitis B Vaccine 3 dose series if at intermediate or high risk (ex: diabetes, end stage renal disease, liver disease)   Tetanus (Td) Vaccine - COST NOT COVERED BY MEDICARE PART B Following completion of primary series, a booster dose should be given every 10 years to maintain immunity against tetanus  Td may also be given as tetanus wound prophylaxis  Tdap Vaccine - COST NOT COVERED BY MEDICARE PART B Recommended at least once for all adults  For pregnant patients, recommended with each pregnancy  Shingles Vaccine (Shingrix) - COST NOT COVERED BY MEDICARE PART B  2 shot series recommended in those aged 48 and above     Health Maintenance Due:      Topic Date Due    Hepatitis C Screening  1955    CRC Screening: Colonoscopy  1955     Immunizations Due:      Topic Date Due    Pneumococcal Vaccine: Pediatrics (0 to 5 Years) and At-Risk Patients (6 to 59 Years) (1 of 1 - PPSV23) 06/15/1962    Influenza Vaccine  07/01/2019     Advance Directives   What are advance directives? Advance directives are legal documents that state your wishes and plans for medical care   These plans are made ahead of time in case you lose your ability to make decisions for yourself  Advance directives can apply to any medical decision, such as the treatments you want, and if you want to donate organs  What are the types of advance directives? There are many types of advance directives, and each state has rules about how to use them  You may choose a combination of any of the following:  · Living will: This is a written record of the treatment you want  You can also choose which treatments you do not want, which to limit, and which to stop at a certain time  This includes surgery, medicine, IV fluid, and tube feedings  · Durable power of  for healthcare [facility]): This is a written record that states who you want to make healthcare choices for you when you are unable to make them for yourself  This person, called a proxy, is usually a family member or a friend  You may choose more than 1 proxy  · Do not resuscitate (DNR) order:  A DNR order is used in case your heart stops beating or you stop breathing  It is a request not to have certain forms of treatment, such as CPR  A DNR order may be included in other types of advance directives  · Medical directive: This covers the care that you want if you are in a coma, near death, or unable to make decisions for yourself  You can list the treatments you want for each condition  Treatment may include pain medicine, surgery, blood transfusions, dialysis, IV or tube feedings, and a ventilator (breathing machine)  · Values history: This document has questions about your views, beliefs, and how you feel and think about life  This information can help others choose the care that you would choose  Why are advance directives important? An advance directive helps you control your care  Although spoken wishes may be used, it is better to have your wishes written down  Spoken wishes can be misunderstood, or not followed   Treatments may be given even if you do not want them  An advance directive may make it easier for your family to make difficult choices about your care  Cigarette Smoking and Your Health   Risks to your health if you smoke:  Nicotine and other chemicals found in tobacco damage every cell in your body  Even if you are a light smoker, you have an increased risk for cancer, heart disease, and lung disease  If you are pregnant or have diabetes, smoking increases your risk for complications  Benefits to your health if you stop smoking:   · You decrease respiratory symptoms such as coughing, wheezing, and shortness of breath  · You reduce your risk for cancers of the lung, mouth, throat, kidney, bladder, pancreas, stomach, and cervix  If you already have cancer, you increase the benefits of chemotherapy  You also reduce your risk for cancer returning or a second cancer from developing  · You reduce your risk for heart disease, blood clots, heart attack, and stroke  · You reduce your risk for lung infections, and diseases such as pneumonia, asthma, chronic bronchitis, and emphysema  · Your circulation improves  More oxygen can be delivered to your body  If you have diabetes, you lower your risk for complications, such as kidney, artery, and eye diseases  You also lower your risk for nerve damage  Nerve damage can lead to amputations, poor vision, and blindness  · You improve your body's ability to heal and to fight infections  For more information and support to stop smoking:   · Smokefree  gov  Phone: 2- 909 - 290-0186  Web Address: www [employer]   Sydnee Manndanilo 2018 Information is for End User's use only and may not be sold, redistributed or otherwise used for commercial purposes  All illustrations and images included in CareNotes® are the copyrighted property of [employer]  or [address] [place] [address] to get chest xray, referred to urology  Several forms filled out for group home

## 2020-03-03 ENCOUNTER — HOSPITAL ENCOUNTER (OUTPATIENT)
Dept: RADIOLOGY | Facility: HOSPITAL | Age: 65
Discharge: HOME | End: 2020-03-03
Payer: MEDICARE

## 2020-03-03 DIAGNOSIS — Z87.891 HISTORY OF SMOKING: ICD-10-CM

## 2020-03-03 DIAGNOSIS — J44.1 COPD WITH EXACERBATION (HCC): ICD-10-CM

## 2020-03-03 DIAGNOSIS — F19.11 HISTORY OF DRUG ABUSE IN REMISSION (HCC): ICD-10-CM

## 2020-03-03 PROCEDURE — 71046 X-RAY EXAM CHEST 2 VIEWS: CPT

## 2020-03-10 LAB
HBA1C MFR BLD HPLC: 6.1 %
HCV AB SER-ACNC: NEGATIVE

## 2020-04-03 ENCOUNTER — TELEPHONE (OUTPATIENT)
Dept: UROLOGY | Facility: AMBULATORY SURGERY CENTER | Age: 65
End: 2020-04-03

## 2020-04-07 ENCOUNTER — TELEMEDICINE (OUTPATIENT)
Dept: UROLOGY | Facility: AMBULATORY SURGERY CENTER | Age: 65
End: 2020-04-07
Payer: MEDICARE

## 2020-04-07 ENCOUNTER — TELEPHONE (OUTPATIENT)
Dept: FAMILY MEDICINE CLINIC | Facility: HOSPITAL | Age: 65
End: 2020-04-07

## 2020-04-07 DIAGNOSIS — F10.11 HISTORY OF ALCOHOL ABUSE: ICD-10-CM

## 2020-04-07 DIAGNOSIS — F19.11 HISTORY OF DRUG ABUSE IN REMISSION (HCC): ICD-10-CM

## 2020-04-07 DIAGNOSIS — R35.1 BENIGN PROSTATIC HYPERPLASIA WITH NOCTURIA: ICD-10-CM

## 2020-04-07 DIAGNOSIS — N40.1 BENIGN PROSTATIC HYPERPLASIA WITH NOCTURIA: ICD-10-CM

## 2020-04-07 PROCEDURE — 99443 PR PHYS/QHP TELEPHONE EVALUATION 21-30 MIN: CPT | Performed by: UROLOGY

## 2020-04-07 RX ORDER — TAMSULOSIN HYDROCHLORIDE 0.4 MG/1
0.4 CAPSULE ORAL
Qty: 90 CAPSULE | Refills: 3 | Status: SHIPPED | OUTPATIENT
Start: 2020-04-07

## 2020-05-20 ENCOUNTER — TELEMEDICINE (OUTPATIENT)
Dept: FAMILY MEDICINE CLINIC | Facility: HOSPITAL | Age: 65
End: 2020-05-20
Payer: MEDICARE

## 2020-05-20 VITALS
HEART RATE: 82 BPM | DIASTOLIC BLOOD PRESSURE: 87 MMHG | SYSTOLIC BLOOD PRESSURE: 125 MMHG | TEMPERATURE: 96.8 F | BODY MASS INDEX: 21.4 KG/M2 | WEIGHT: 158 LBS | HEIGHT: 72 IN

## 2020-05-20 DIAGNOSIS — F20.9 SCHIZOPHRENIA, UNSPECIFIED TYPE (HCC): ICD-10-CM

## 2020-05-20 DIAGNOSIS — J44.1 CHRONIC OBSTRUCTIVE PULMONARY DISEASE WITH ACUTE EXACERBATION (HCC): ICD-10-CM

## 2020-05-20 DIAGNOSIS — E11.9 TYPE 2 DIABETES MELLITUS WITHOUT COMPLICATION, WITHOUT LONG-TERM CURRENT USE OF INSULIN (HCC): Primary | ICD-10-CM

## 2020-05-20 PROCEDURE — 99214 OFFICE O/P EST MOD 30 MIN: CPT | Performed by: PHYSICIAN ASSISTANT

## 2020-07-14 ENCOUNTER — TELEPHONE (OUTPATIENT)
Dept: UROLOGY | Facility: AMBULATORY SURGERY CENTER | Age: 65
End: 2020-07-14

## 2020-07-14 NOTE — TELEPHONE ENCOUNTER
I do not recall who I spoke with but we recieved a call regarding patients appt with us on 7/15/20, patient had been tested for covid 7/12/20, patient was symptomatic(body aces and fever ) They did not get results back, patient was tested at [address] urgent care  Alfred (JUHI) said it would be okay to cancel and reschedule once we get results back  Once they get results back they will give us a call so we can schedule patient accordingly  Thank you!

## 2020-09-01 LAB — HBA1C MFR BLD HPLC: 5.8 %

## 2020-10-28 ENCOUNTER — OFFICE VISIT (OUTPATIENT)
Dept: FAMILY MEDICINE CLINIC | Facility: HOSPITAL | Age: 65
End: 2020-10-28
Payer: MEDICARE

## 2020-10-28 VITALS
BODY MASS INDEX: 22.59 KG/M2 | WEIGHT: 166.8 LBS | HEART RATE: 99 BPM | HEIGHT: 72 IN | SYSTOLIC BLOOD PRESSURE: 130 MMHG | TEMPERATURE: 95.1 F | DIASTOLIC BLOOD PRESSURE: 80 MMHG

## 2020-10-28 DIAGNOSIS — Z12.5 ENCOUNTER FOR SCREENING FOR MALIGNANT NEOPLASM OF PROSTATE: ICD-10-CM

## 2020-10-28 DIAGNOSIS — Z12.11 SCREENING FOR COLON CANCER: ICD-10-CM

## 2020-10-28 DIAGNOSIS — K42.9 UMBILICAL HERNIA WITHOUT OBSTRUCTION AND WITHOUT GANGRENE: ICD-10-CM

## 2020-10-28 DIAGNOSIS — J44.1 CHRONIC OBSTRUCTIVE PULMONARY DISEASE WITH ACUTE EXACERBATION (HCC): ICD-10-CM

## 2020-10-28 DIAGNOSIS — E11.9 TYPE 2 DIABETES MELLITUS WITHOUT COMPLICATION, WITHOUT LONG-TERM CURRENT USE OF INSULIN (HCC): ICD-10-CM

## 2020-10-28 DIAGNOSIS — N40.1 BENIGN PROSTATIC HYPERPLASIA WITH NOCTURIA: ICD-10-CM

## 2020-10-28 DIAGNOSIS — F17.218 CIGARETTE NICOTINE DEPENDENCE WITH OTHER NICOTINE-INDUCED DISORDER: ICD-10-CM

## 2020-10-28 DIAGNOSIS — R35.1 BENIGN PROSTATIC HYPERPLASIA WITH NOCTURIA: ICD-10-CM

## 2020-10-28 DIAGNOSIS — F20.9 SCHIZOPHRENIA, UNSPECIFIED TYPE (HCC): Primary | ICD-10-CM

## 2020-10-28 PROCEDURE — 99214 OFFICE O/P EST MOD 30 MIN: CPT | Performed by: PHYSICIAN ASSISTANT

## 2020-11-11 ENCOUNTER — OFFICE VISIT (OUTPATIENT)
Dept: FAMILY MEDICINE CLINIC | Facility: HOSPITAL | Age: 65
End: 2020-11-11
Payer: MEDICARE

## 2020-11-11 VITALS
SYSTOLIC BLOOD PRESSURE: 140 MMHG | TEMPERATURE: 96.6 F | DIASTOLIC BLOOD PRESSURE: 70 MMHG | HEART RATE: 63 BPM | HEIGHT: 72 IN | BODY MASS INDEX: 23.03 KG/M2 | WEIGHT: 170 LBS

## 2020-11-11 DIAGNOSIS — K42.9 UMBILICAL HERNIA WITHOUT OBSTRUCTION AND WITHOUT GANGRENE: Primary | ICD-10-CM

## 2020-11-11 PROCEDURE — 99213 OFFICE O/P EST LOW 20 MIN: CPT | Performed by: PHYSICIAN ASSISTANT

## 2020-11-18 ENCOUNTER — HOSPITAL ENCOUNTER (OUTPATIENT)
Dept: ULTRASOUND IMAGING | Facility: HOSPITAL | Age: 65
Discharge: HOME | End: 2020-11-18
Payer: MEDICARE

## 2020-11-18 DIAGNOSIS — K42.9 UMBILICAL HERNIA WITHOUT OBSTRUCTION AND WITHOUT GANGRENE: ICD-10-CM

## 2020-11-18 PROCEDURE — 76705 ECHO EXAM OF ABDOMEN: CPT

## 2020-11-27 ENCOUNTER — CONSULT (OUTPATIENT)
Dept: SURGERY | Facility: HOSPITAL | Age: 65
End: 2020-11-27
Payer: MEDICARE

## 2020-11-27 VITALS
HEIGHT: 72 IN | TEMPERATURE: 98.2 F | DIASTOLIC BLOOD PRESSURE: 78 MMHG | WEIGHT: 167 LBS | SYSTOLIC BLOOD PRESSURE: 143 MMHG | BODY MASS INDEX: 22.62 KG/M2 | HEART RATE: 89 BPM

## 2020-11-27 DIAGNOSIS — K42.9 UMBILICAL HERNIA WITHOUT OBSTRUCTION AND WITHOUT GANGRENE: ICD-10-CM

## 2020-11-27 PROCEDURE — 99203 OFFICE O/P NEW LOW 30 MIN: CPT | Performed by: PHYSICIAN ASSISTANT

## 2020-12-01 ENCOUNTER — TELEPHONE (OUTPATIENT)
Dept: SURGERY | Facility: CLINIC | Age: 65
End: 2020-12-01

## 2020-12-03 ENCOUNTER — OFFICE VISIT (OUTPATIENT)
Dept: FAMILY MEDICINE CLINIC | Facility: HOSPITAL | Age: 65
End: 2020-12-03
Payer: MEDICARE

## 2020-12-03 VITALS
TEMPERATURE: 97.8 F | RESPIRATION RATE: 16 BRPM | BODY MASS INDEX: 22.08 KG/M2 | DIASTOLIC BLOOD PRESSURE: 68 MMHG | HEART RATE: 78 BPM | HEIGHT: 72 IN | SYSTOLIC BLOOD PRESSURE: 118 MMHG | WEIGHT: 163 LBS

## 2020-12-03 DIAGNOSIS — R39.9 UTI SYMPTOMS: ICD-10-CM

## 2020-12-03 DIAGNOSIS — K42.9 UMBILICAL HERNIA WITHOUT OBSTRUCTION AND WITHOUT GANGRENE: Primary | ICD-10-CM

## 2020-12-03 DIAGNOSIS — Z13.9 ENCOUNTER FOR HEALTH-RELATED SCREENING: ICD-10-CM

## 2020-12-03 PROCEDURE — 99213 OFFICE O/P EST LOW 20 MIN: CPT | Performed by: PHYSICIAN ASSISTANT

## 2020-12-10 ENCOUNTER — TELEPHONE (OUTPATIENT)
Dept: SURGERY | Facility: HOSPITAL | Age: 65
End: 2020-12-10

## 2020-12-21 ENCOUNTER — TELEMEDICINE (OUTPATIENT)
Dept: GASTROENTEROLOGY | Facility: CLINIC | Age: 65
End: 2020-12-21

## 2020-12-21 VITALS — WEIGHT: 158 LBS | HEIGHT: 72 IN | BODY MASS INDEX: 21.4 KG/M2

## 2020-12-21 DIAGNOSIS — Z12.11 ENCOUNTER FOR SCREENING COLONOSCOPY: Primary | ICD-10-CM

## 2020-12-21 DIAGNOSIS — Z12.11 SCREENING FOR COLON CANCER: ICD-10-CM

## 2020-12-21 RX ORDER — SODIUM, POTASSIUM,MAG SULFATES 17.5-3.13G
SOLUTION, RECONSTITUTED, ORAL ORAL
Qty: 2 BOTTLE | Refills: 0 | Status: SHIPPED | OUTPATIENT
Start: 2020-12-21 | End: 2021-09-29

## 2020-12-22 ENCOUNTER — ANESTHESIA EVENT (OUTPATIENT)
Dept: GASTROENTEROLOGY | Facility: HOSPITAL | Age: 65
End: 2020-12-22

## 2020-12-24 ENCOUNTER — HOSPITAL ENCOUNTER (OUTPATIENT)
Dept: GASTROENTEROLOGY | Facility: HOSPITAL | Age: 65
Setting detail: OUTPATIENT SURGERY
Discharge: HOME | End: 2020-12-24
Attending: INTERNAL MEDICINE | Admitting: INTERNAL MEDICINE
Payer: MEDICARE

## 2020-12-24 ENCOUNTER — ANESTHESIA (OUTPATIENT)
Dept: GASTROENTEROLOGY | Facility: HOSPITAL | Age: 65
End: 2020-12-24

## 2020-12-24 VITALS
BODY MASS INDEX: 19.48 KG/M2 | SYSTOLIC BLOOD PRESSURE: 117 MMHG | DIASTOLIC BLOOD PRESSURE: 73 MMHG | OXYGEN SATURATION: 99 % | HEART RATE: 71 BPM | HEIGHT: 76 IN | RESPIRATION RATE: 16 BRPM | WEIGHT: 160 LBS | TEMPERATURE: 97.3 F

## 2020-12-24 VITALS — HEART RATE: 69 BPM

## 2020-12-24 DIAGNOSIS — Z12.11 SCREENING FOR COLON CANCER: ICD-10-CM

## 2020-12-24 PROBLEM — E78.5 HYPERLIPIDEMIA: Status: ACTIVE | Noted: 2020-12-24

## 2020-12-24 PROBLEM — F17.200 SMOKING: Status: ACTIVE | Noted: 2020-12-24

## 2020-12-24 PROBLEM — IMO0001 SMOKING: Status: ACTIVE | Noted: 2020-12-24

## 2020-12-24 LAB
FLUAV RNA RESP QL NAA+PROBE: NEGATIVE
FLUBV RNA RESP QL NAA+PROBE: NEGATIVE
GLUCOSE SERPL-MCNC: 101 MG/DL (ref 65–140)
RSV RNA RESP QL NAA+PROBE: NEGATIVE
SARS-COV-2 RNA RESP QL NAA+PROBE: NEGATIVE

## 2020-12-24 PROCEDURE — 82948 REAGENT STRIP/BLOOD GLUCOSE: CPT

## 2020-12-24 PROCEDURE — 0241U HB NFCT DS VIR RESP RNA 4 TRGT: CPT | Performed by: INTERNAL MEDICINE

## 2020-12-24 PROCEDURE — G0121 COLON CA SCRN NOT HI RSK IND: HCPCS | Performed by: INTERNAL MEDICINE

## 2020-12-24 RX ORDER — MEPERIDINE HYDROCHLORIDE 25 MG/ML
12.5 INJECTION INTRAMUSCULAR; INTRAVENOUS; SUBCUTANEOUS
Status: DISCONTINUED | OUTPATIENT
Start: 2020-12-24 | End: 2020-12-28 | Stop reason: HOSPADM

## 2020-12-24 RX ORDER — FENTANYL CITRATE/PF 50 MCG/ML
12.5 SYRINGE (ML) INJECTION
Status: DISCONTINUED | OUTPATIENT
Start: 2020-12-24 | End: 2020-12-28 | Stop reason: HOSPADM

## 2020-12-24 RX ORDER — LABETALOL 20 MG/4 ML (5 MG/ML) INTRAVENOUS SYRINGE
5
Status: DISCONTINUED | OUTPATIENT
Start: 2020-12-24 | End: 2020-12-28 | Stop reason: HOSPADM

## 2020-12-24 RX ORDER — ONDANSETRON 2 MG/ML
4 INJECTION INTRAMUSCULAR; INTRAVENOUS ONCE AS NEEDED
Status: DISCONTINUED | OUTPATIENT
Start: 2020-12-24 | End: 2020-12-28 | Stop reason: HOSPADM

## 2020-12-24 RX ORDER — PROPOFOL 10 MG/ML
INJECTION, EMULSION INTRAVENOUS AS NEEDED
Status: DISCONTINUED | OUTPATIENT
Start: 2020-12-24 | End: 2020-12-24

## 2020-12-24 RX ORDER — HYDRALAZINE HYDROCHLORIDE 20 MG/ML
5 INJECTION INTRAMUSCULAR; INTRAVENOUS
Status: DISCONTINUED | OUTPATIENT
Start: 2020-12-24 | End: 2020-12-28 | Stop reason: HOSPADM

## 2020-12-24 RX ORDER — METOCLOPRAMIDE HYDROCHLORIDE 5 MG/ML
10 INJECTION INTRAMUSCULAR; INTRAVENOUS ONCE AS NEEDED
Status: DISCONTINUED | OUTPATIENT
Start: 2020-12-24 | End: 2020-12-28 | Stop reason: HOSPADM

## 2020-12-24 RX ORDER — SODIUM CHLORIDE 9 MG/ML
50 INJECTION, SOLUTION INTRAVENOUS CONTINUOUS
Status: DISCONTINUED | OUTPATIENT
Start: 2020-12-24 | End: 2020-12-28 | Stop reason: HOSPADM

## 2020-12-24 RX ADMIN — PROPOFOL 50 MG: 10 INJECTION, EMULSION INTRAVENOUS at 09:21

## 2020-12-24 RX ADMIN — PROPOFOL 100 MG: 10 INJECTION, EMULSION INTRAVENOUS at 09:11

## 2020-12-24 RX ADMIN — PROPOFOL 50 MG: 10 INJECTION, EMULSION INTRAVENOUS at 09:14

## 2020-12-24 RX ADMIN — PROPOFOL 50 MG: 10 INJECTION, EMULSION INTRAVENOUS at 09:26

## 2020-12-24 RX ADMIN — SODIUM CHLORIDE: 0.9 INJECTION, SOLUTION INTRAVENOUS at 07:47

## 2021-01-23 ENCOUNTER — OFFICE VISIT (OUTPATIENT)
Dept: URGENT CARE | Facility: CLINIC | Age: 66
End: 2021-01-23
Payer: MEDICARE

## 2021-01-23 VITALS
OXYGEN SATURATION: 98 % | BODY MASS INDEX: 20.58 KG/M2 | DIASTOLIC BLOOD PRESSURE: 70 MMHG | HEART RATE: 98 BPM | SYSTOLIC BLOOD PRESSURE: 130 MMHG | HEIGHT: 76 IN | WEIGHT: 169 LBS | RESPIRATION RATE: 16 BRPM

## 2021-01-23 DIAGNOSIS — S90.822A BLISTER OF LEFT FOOT, INITIAL ENCOUNTER: Primary | ICD-10-CM

## 2021-01-23 PROCEDURE — 99213 OFFICE O/P EST LOW 20 MIN: CPT | Performed by: PHYSICIAN ASSISTANT

## 2021-01-23 PROCEDURE — G0463 HOSPITAL OUTPT CLINIC VISIT: HCPCS | Performed by: PHYSICIAN ASSISTANT
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
## 2021-03-02 LAB
EXTERNAL HIV SCREEN: NORMAL
HBA1C MFR BLD HPLC: 6.2 %

## 2021-05-05 ENCOUNTER — OFFICE VISIT (OUTPATIENT)
Dept: FAMILY MEDICINE CLINIC | Facility: HOSPITAL | Age: 66
End: 2021-05-05
Payer: MEDICARE

## 2021-05-05 VITALS
BODY MASS INDEX: 20.58 KG/M2 | OXYGEN SATURATION: 97 % | SYSTOLIC BLOOD PRESSURE: 132 MMHG | WEIGHT: 169 LBS | HEIGHT: 76 IN | DIASTOLIC BLOOD PRESSURE: 72 MMHG | HEART RATE: 92 BPM

## 2021-05-05 DIAGNOSIS — R35.1 BENIGN PROSTATIC HYPERPLASIA WITH NOCTURIA: ICD-10-CM

## 2021-05-05 DIAGNOSIS — Z00.00 HEALTH CARE MAINTENANCE: Primary | ICD-10-CM

## 2021-05-05 DIAGNOSIS — F20.9 SCHIZOPHRENIA, UNSPECIFIED TYPE (HCC): ICD-10-CM

## 2021-05-05 DIAGNOSIS — F19.11 HISTORY OF DRUG ABUSE IN REMISSION (HCC): ICD-10-CM

## 2021-05-05 DIAGNOSIS — E11.9 TYPE 2 DIABETES MELLITUS WITHOUT COMPLICATION, WITHOUT LONG-TERM CURRENT USE OF INSULIN (HCC): ICD-10-CM

## 2021-05-05 DIAGNOSIS — N40.1 BENIGN PROSTATIC HYPERPLASIA WITH NOCTURIA: ICD-10-CM

## 2021-05-05 DIAGNOSIS — J44.1 CHRONIC OBSTRUCTIVE PULMONARY DISEASE WITH ACUTE EXACERBATION (HCC): ICD-10-CM

## 2021-05-05 PROCEDURE — G0439 PPPS, SUBSEQ VISIT: HCPCS | Performed by: PHYSICIAN ASSISTANT

## 2021-05-05 RX ORDER — HALOPERIDOL 1 MG/1
0.5 TABLET ORAL 3 TIMES DAILY
COMMUNITY
End: 2022-05-12

## 2021-05-05 NOTE — PATIENT INSTRUCTIONS
Medicare Preventive Visit Patient Instructions  Thank you for completing your Welcome to Medicare Visit or Medicare Annual Wellness Visit today  Your next wellness visit will be due in one year (5/6/2022)  The screening/preventive services that you may require over the next 5-10 years are detailed below  Some tests may not apply to you based off risk factors and/or age  Screening tests ordered at today's visit but not completed yet may show as past due  Also, please note that scanned in results may not display below  Preventive Screenings:  Service Recommendations Previous Testing/Comments   Colorectal Cancer Screening  · Colonoscopy    · Fecal Occult Blood Test (FOBT)/Fecal Immunochemical Test (FIT)  · Fecal DNA/Cologuard Test  · Flexible Sigmoidoscopy Age: 54-65 years old   Colonoscopy: every 10 years (May be performed more frequently if at higher risk)  OR  FOBT/FIT: every 1 year  OR  Cologuard: every 3 years  OR  Sigmoidoscopy: every 5 years  Screening may be recommended earlier than age 48 if at higher risk for colorectal cancer  Also, an individualized decision between you and your healthcare provider will decide whether screening between the ages of 74-80 would be appropriate   Colonoscopy: 12/24/2020  FOBT/FIT: Not on file  Cologuard: Not on file  Sigmoidoscopy: Not on file    Screening Current     Prostate Cancer Screening Individualized decision between patient and health care provider in men between ages of 53-78   Medicare will cover every 12 months beginning on the day after your 50th birthday PSA: No results in last 5 years           Hepatitis C Screening Once for adults born between 1945 and 1965  More frequently in patients at high risk for Hepatitis C Hep C Antibody: 03/10/2020    Screening Current   Diabetes Screening 1-2 times per year if you're at risk for diabetes or have pre-diabetes Fasting glucose: No results in last 5 years   A1C: 6 2    Screening Not Indicated  History Diabetes Cholesterol Screening Once every 5 years if you don't have a lipid disorder  May order more often based on risk factors  Lipid panel: Not on file    Screening Not Indicated  History Lipid Disorder      Other Preventive Screenings Covered by Medicare:  1  Abdominal Aortic Aneurysm (AAA) Screening: covered once if your at risk  You're considered to be at risk if you have a family history of AAA or a male between the age of 73-68 who smoking at least 100 cigarettes in your lifetime  2  Lung Cancer Screening: covers low dose CT scan once per year if you meet all of the following conditions: (1) Age 50-69; (2) No signs or symptoms of lung cancer; (3) Current smoker or have quit smoking within the last 15 years; (4) You have a tobacco smoking history of at least 30 pack years (packs per day x number of years you smoked); (5) You get a written order from a healthcare provider  3  Glaucoma Screening: covered annually if you're considered high risk: (1) You have diabetes OR (2) Family history of glaucoma OR (3)  aged 48 and older OR (3)  American aged 72 and older  3  Osteoporosis Screening: covered every 2 years if you meet one of the following conditions: (1) Have a vertebral abnormality; (2) On glucocorticoid therapy for more than 3 months; (3) Have primary hyperparathyroidism; (4) On osteoporosis medications and need to assess response to drug therapy  5  HIV Screening: covered annually if you're between the age of 12-76  Also covered annually if you are younger than 13 and older than 72 with risk factors for HIV infection  For pregnant patients, it is covered up to 3 times per pregnancy      Immunizations:  Immunization Recommendations   Influenza Vaccine Annual influenza vaccination during flu season is recommended for all persons aged >= 6 months who do not have contraindications   Pneumococcal Vaccine (Prevnar and Pneumovax)  * Prevnar = PCV13  * Pneumovax = PPSV23 Adults 25-60 years old: 1-3 doses may be recommended based on certain risk factors  Adults 72 years old: Prevnar (PCV13) vaccine recommended followed by Pneumovax (PPSV23) vaccine  If already received PPSV23 since turning 65, then PCV13 recommended at least one year after PPSV23 dose  Hepatitis B Vaccine 3 dose series if at intermediate or high risk (ex: diabetes, end stage renal disease, liver disease)   Tetanus (Td) Vaccine - COST NOT COVERED BY MEDICARE PART B Following completion of primary series, a booster dose should be given every 10 years to maintain immunity against tetanus  Td may also be given as tetanus wound prophylaxis  Tdap Vaccine - COST NOT COVERED BY MEDICARE PART B Recommended at least once for all adults  For pregnant patients, recommended with each pregnancy  Shingles Vaccine (Shingrix) - COST NOT COVERED BY MEDICARE PART B  2 shot series recommended in those aged 48 and above     Health Maintenance Due:      Topic Date Due    Lung Cancer Screening  Never done    Colonoscopy Surveillance  12/24/2021    Colorectal Cancer Screening  12/24/2030    HIV Screening  Completed    Hepatitis C Screening  Completed     Immunizations Due:      Topic Date Due    Pneumococcal Vaccine: Pediatrics (0 to 5 Years) and At-Risk Patients (6 to 59 Years) (1 of 1 - PPSV23) 06/15/1961    COVID-19 Vaccine (1) Never done     Advance Directives   What are advance directives? Advance directives are legal documents that state your wishes and plans for medical care  These plans are made ahead of time in case you lose your ability to make decisions for yourself  Advance directives can apply to any medical decision, such as the treatments you want, and if you want to donate organs  What are the types of advance directives? There are many types of advance directives, and each state has rules about how to use them  You may choose a combination of any of the following:  · Living will:   This is a written record of the treatment you want  You can also choose which treatments you do not want, which to limit, and which to stop at a certain time  This includes surgery, medicine, IV fluid, and tube feedings  · Durable power of  for healthcare [place] [facility]): This is a written record that states who you want to make healthcare choices for you when you are unable to make them for yourself  This person, called a proxy, is usually a family member or a friend  You may choose more than 1 proxy  · Do not resuscitate (DNR) order:  A DNR order is used in case your heart stops beating or you stop breathing  It is a request not to have certain forms of treatment, such as CPR  A DNR order may be included in other types of advance directives  · Medical directive: This covers the care that you want if you are in a coma, near death, or unable to make decisions for yourself  You can list the treatments you want for each condition  Treatment may include pain medicine, surgery, blood transfusions, dialysis, IV or tube feedings, and a ventilator (breathing machine)  · Values history: This document has questions about your views, beliefs, and how you feel and think about life  This information can help others choose the care that you would choose  Why are advance directives important? An advance directive helps you control your care  Although spoken wishes may be used, it is better to have your wishes written down  Spoken wishes can be misunderstood, or not followed  Treatments may be given even if you do not want them  An advance directive may make it easier for your family to make difficult choices about your care  Cigarette Smoking and Your Health   Risks to your health if you smoke:  Nicotine and other chemicals found in tobacco damage every cell in your body  Even if you are a light smoker, you have an increased risk for cancer, heart disease, and lung disease  If you are pregnant or have diabetes, smoking increases your risk for complications  Benefits to your health if you stop smoking:   · You decrease respiratory symptoms such as coughing, wheezing, and shortness of breath  · You reduce your risk for cancers of the lung, mouth, throat, kidney, bladder, pancreas, stomach, and cervix  If you already have cancer, you increase the benefits of chemotherapy  You also reduce your risk for cancer returning or a second cancer from developing  · You reduce your risk for heart disease, blood clots, heart attack, and stroke  · You reduce your risk for lung infections, and diseases such as pneumonia, asthma, chronic bronchitis, and emphysema  · Your circulation improves  More oxygen can be delivered to your body  If you have diabetes, you lower your risk for complications, such as kidney, artery, and eye diseases  You also lower your risk for nerve damage  Nerve damage can lead to amputations, poor vision, and blindness  · You improve your body's ability to heal and to fight infections  For more information and support to stop smoking:   · Smokefree  gov  Phone: 7- 906 - 923-9340  Web Address: www [employer]  [address] [place], [place] [address] 2018 Information is for End User's use only and may not be sold, redistributed or otherwise used for commercial purposes   All illustrations and images included in CareNotes® are the copyrighted property of [employer]  or [address] [place] [address] [facility]

## 2021-05-05 NOTE — PROGRESS NOTES
Assessment and Plan:   Health care maintenance    Problem List Items Addressed This Visit     None           Preventive health issues were discussed with patient, and age appropriate screening tests were ordered as noted in patient's After Visit Summary  Personalized health advice and appropriate referrals for health education or preventive services given if needed, as noted in patient's After Visit Summary  History of Present Illness:     Patient presents for Welcome to Medicare visit  Patient Care Team:  Kayla Balderas PA-C as PCP - General (Internal Medicine)     Review of Systems:     Review of Systems   Constitutional: Positive for fatigue  Negative for appetite change, chills, diaphoresis and fever  HENT: Negative for congestion, ear pain, hearing loss, rhinorrhea and sore throat  Eyes: Positive for pain and visual disturbance  Negative for discharge  "I think I have glaucoma"  Respiratory: Positive for cough, chest tightness and shortness of breath  Has hx  COPD, and occasional cough  Gastrointestinal: Negative for abdominal pain, constipation, diarrhea, nausea and vomiting  Musculoskeletal: Negative for arthralgias, back pain, myalgias, neck pain and neck stiffness  Neurological: Negative for dizziness, light-headedness, numbness and headaches  Psychiatric/Behavioral:        Sees psych regularly          Problem List:     Patient Active Problem List   Diagnosis    Type 2 diabetes mellitus without complication, without long-term current use of insulin (HCC)    Chronic obstructive pulmonary disease with acute exacerbation (HCC)    Benign prostatic hyperplasia with nocturia    [facility]    Smoking    Hyperlipidemia      Past Medical and Surgical History:     Past Medical History:   Diagnosis Date    Atopic rhinitis     BPH with urinary obstruction     COPD [facility]chronic obstructive [facility] disease) [facility]     [facility]     Hyperlipidemia     Incomplete bladder emptying     Schizophrenia (HCC)      No past surgical history on file     Family History:     Family History   Problem Relation Age of Onset    Substance Abuse Sister         cigarettes    Substance Abuse Brother     Alcohol abuse Brother       Social History:        Social History     Socioeconomic History    Marital status: Single     Spouse name: Not on file    Number of children: Not on file    Years of education: Not on file    Highest education level: Not on file   Occupational History    Not on file   Social Needs    Financial resource strain: Not on file    Food insecurity     Worry: Not on file     Inability: Not on file    Transportation needs     Medical: No     Non-medical: No   Tobacco Use    Smoking status: Current Every Day Smoker     Packs/day: 1 00     Years: 45 00     Pack years: 45 00    Smokeless tobacco: Never Used   Substance and Sexual Activity    Alcohol use: No     Comment: hx  of hard liquor, regular use, quit at age 33    Robin Abt Drug use: No    Sexual activity: Not on file   Lifestyle    Physical activity     Days per week: 0 days     Minutes per session: 0 min    Stress: Rather much   Relationships    Social connections     Talks on phone: Not on file     Gets together: Not on file     Attends Sabianism service: Not on file     Active member of club or organization: Not on file     Attends meetings of clubs or organizations: Not on file     Relationship status: Not on file    Intimate partner violence     Fear of current or ex partner: No     Emotionally abused: No     Physically abused: No     Forced sexual activity: No   Other Topics Concern    Not on file   Social History Narrative    [facility]ives at [facility]    Feels safe where he lives    No living will    Sees  Dentist occas       Medications and Allergies:     Current Outpatient Medications   Medication Sig Dispense Refill    albuterol (PROVENTIL HFA,VENTOLIN HFA) 90 mcg/act inhaler Inhale 2 puffs every 6 (six) hours as needed for wheezing or shortness of breath 1 Inhaler 5    ASPIRIN 81 PO Take by mouth 1  daily      b complex vitamins capsule Take 1 capsule by mouth daily 30 capsule 6    buPROPion (WELLBUTRIN SR) 150 mg 12 hr tablet 1 daily      buPROPion (WELLBUTRIN XL) 300 mg 24 hr tablet Take 300 mg by mouth daily      Cholecalciferol (VITAMIN D3) 5000 units CAPS Take by mouth 1 daily      folic acid (FOLVITE) 1 mg tablet Take 1 tablet (1 mg total) by mouth daily 30 tablet 6    guaiFENesin (ROBITUSSIN) 100 MG/5ML oral liquid Take 10 mL (200 mg total) by mouth 3 (three) times a day as needed for cough 120 mL 0    haloperidol (HALDOL) 1 mg tablet Take 0 5 mg by mouth 3 (three) times a day      ibuprofen (MOTRIN) 200 mg tablet Take by mouth every 6 (six) hours as needed for mild pain      lurasidone (LATUDA) 80 mg tablet Take 120 mg by mouth daily with breakfast       metFORMIN (GLUCOPHAGE) 500 mg tablet Take 500 mg by mouth 2 (two) times a day with meals      Multiple Vitamins-Minerals (MULTIVITAMIN WITH MINERALS) tablet Take 1 tablet by mouth daily      Naloxone HCl (NARCAN NA) into each nostril Administer nasally when suspected overdose      omega-3-acid ethyl esters (LOVAZA) 1 g capsule Take 2 g by mouth daily      sertraline (ZOLOFT) 100 mg tablet 1 daily      tamsulosin (FLOMAX) 0 4 mg Take 1 capsule (0 4 mg total) by mouth daily with dinner 90 capsule 3    Na Sulfate-K Sulfate-Mg Sulf (Suprep Bowel Prep Kit) 17 5-3 13-1 6 GM/177ML SOLN As directed (1 kit) (Patient not taking: Reported on 5/5/2021) 2 Bottle 0     No current facility-administered medications for this visit  No Known Allergies   Immunizations: There is no immunization history on file for this patient     Health Maintenance:         Topic Date Due    Lung Cancer Screening  Never done    Colonoscopy Surveillance  12/24/2021    Colorectal Cancer Screening  12/24/2030    HIV Screening  Completed  Hepatitis C Screening  Completed         Topic Date Due    Pneumococcal Vaccine: Pediatrics (0 to 5 Years) and At-Risk Patients (6 to 59 Years) (1 of 1 - PPSV23) 06/15/1961    COVID-19 Vaccine (1) Never done      Medicare Screening Tests and Risk Assessments:         Health Risk Assessment:   Patient rates overall health as very good  Patient feels that their physical health rating is much worse  Patient is dissatisfied with their life  Eyesight was rated as slightly worse  Hearing was rated as same  Patient feels that their emotional and mental health rating is slightly worse  Patients states they are sometimes angry  Patient states they are always unusually tired/fatigued  Pain experienced in the last 7 days has been some  Patient's pain rating has been 9/10  Patient states that he has experienced no weight loss or gain in last 6 months  Depression Screening:   PHQ-2 Score: 0      Fall Risk Screening: In the past year, patient has experienced: history of falling in past year    Number of falls: 2 or more  Injured during fall?: Yes    Feels unsteady when standing or walking?: Yes    Worried about falling?: Yes      Home Safety:  Patient does not have trouble with stairs inside or outside of their home  Patient has working smoke alarms and has working carbon monoxide detector  Nutrition:   Current diet is Regular  Medications:   Patient is not currently taking any over-the-counter supplements  Patient is not able to manage medications  Activities of Daily Living (ADLs)/Instrumental Activities of Daily Living (IADLs):   Walk and transfer into and out of bed and chair?: No  Dress and groom yourself?: No    Bathe or shower yourself?: No    Feed yourself?  Yes  Do your laundry/housekeeping?: Yes  Manage your money, pay your bills and track your expenses?: No  Make your own meals?: Yes    Do your own shopping?: Yes    Previous Hospitalizations:   Any hospitalizations or ED visits within the last 12 months?: No      PREVENTIVE SCREENINGS      Cardiovascular Screening:    General: Screening Not Indicated and History Lipid Disorder      Diabetes Screening:     General: Screening Not Indicated and History Diabetes      Colorectal Cancer Screening:     General: Screening Current      Abdominal Aortic Aneurysm (AAA) Screening:    Risk factors include: tobacco use        Hepatitis C Screening:    General: Screening Current    Screening, Brief Intervention, and Referral to Treatment (SBIRT)    Screening  Typical number of drinks in a day: 0    No exam data present     Physical Exam:     /72   Pulse 92   Ht 6' 4" (1 93 m)   Wt 76 7 kg (169 lb)   SpO2 97%   BMI 20 57 kg/m²     Physical Exam  Vitals signs and nursing note reviewed  Constitutional:       General: He is not in acute distress  Appearance: Normal appearance  He is not ill-appearing, toxic-appearing or diaphoretic  HENT:      Head: Normocephalic and atraumatic  Right Ear: Tympanic membrane, ear canal and external ear normal  There is no impacted cerumen  Left Ear: Tympanic membrane, ear canal and external ear normal  There is no impacted cerumen  Nose: No congestion or rhinorrhea  Mouth/Throat:      Mouth: Mucous membranes are moist       Pharynx: No oropharyngeal exudate or posterior oropharyngeal erythema  Comments: Poor dental hygiene, has several missing teeth  Eyes:      General: No scleral icterus  Right eye: No discharge  Left eye: No discharge  Extraocular Movements: Extraocular movements intact  Conjunctiva/sclera: Conjunctivae normal    Neck:      Musculoskeletal: Neck supple  Cardiovascular:      Rate and Rhythm: Normal rate and regular rhythm  Pulses: Normal pulses  Heart sounds: Normal heart sounds  Pulmonary:      Effort: Pulmonary effort is normal  No respiratory distress  Breath sounds: Normal breath sounds  No stridor  No wheezing, rhonchi or rales  Chest:      Chest wall: No tenderness  Abdominal:      General: Abdomen is flat  There is no distension  Palpations: Abdomen is soft  There is no mass  Tenderness: There is no abdominal tenderness  There is no right CVA tenderness, left CVA tenderness, guarding or rebound  Hernia: No hernia is present  Musculoskeletal: Normal range of motion  General: No swelling, tenderness, deformity or signs of injury  Right lower leg: No edema  Left lower leg: No edema  Skin:     General: Skin is warm and dry  Neurological:      General: No focal deficit present  Mental Status: He is alert and oriented to person, place, and time  Psychiatric:         Mood and Affect: Mood normal       Comments: Patient calm, and cooperative, but answers very bizaare and nonsensical    Judgement poor, and thought content hard to assess               Kai Lagos PA-C

## 2021-06-01 ENCOUNTER — TELEPHONE (OUTPATIENT)
Dept: UROLOGY | Facility: AMBULATORY SURGERY CENTER | Age: 66
End: 2021-06-01

## 2021-06-01 NOTE — TELEPHONE ENCOUNTER
Working on [employer] [facility]  [employer], Established pt of Dr Erica Mendoza being referred to Urology from [place], [place] for N40 1, R35 1 (ICD-10-CM) - Benign prostatic hyperplasia with nocturia     Manjit Durham Please advise on appt  Manjit Durham No appointment spot held for this patient

## 2021-06-03 NOTE — TELEPHONE ENCOUNTER
LM scheduling Nisha Lau for 6/29 @ 2:30  He will need to have someone with him   They are to call back and confirm appointment

## 2021-07-07 ENCOUNTER — OFFICE VISIT (OUTPATIENT)
Dept: URGENT CARE | Facility: CLINIC | Age: 66
End: 2021-07-07
Payer: MEDICARE

## 2021-07-07 ENCOUNTER — HOSPITAL ENCOUNTER (OUTPATIENT)
Dept: RADIOLOGY | Facility: CLINIC | Age: 66
Discharge: HOME | End: 2021-07-07
Payer: MEDICARE

## 2021-07-07 VITALS
DIASTOLIC BLOOD PRESSURE: 70 MMHG | BODY MASS INDEX: 20.95 KG/M2 | TEMPERATURE: 97.8 F | HEART RATE: 79 BPM | OXYGEN SATURATION: 99 % | HEIGHT: 76 IN | WEIGHT: 172 LBS | RESPIRATION RATE: 18 BRPM | SYSTOLIC BLOOD PRESSURE: 130 MMHG

## 2021-07-07 DIAGNOSIS — S89.91XA KNEE INJURY, RIGHT, INITIAL ENCOUNTER: Primary | ICD-10-CM

## 2021-07-07 DIAGNOSIS — S89.91XA KNEE INJURY, RIGHT, INITIAL ENCOUNTER: ICD-10-CM

## 2021-07-07 PROCEDURE — G0463 HOSPITAL OUTPT CLINIC VISIT: HCPCS | Performed by: PHYSICIAN ASSISTANT

## 2021-07-07 PROCEDURE — 99213 OFFICE O/P EST LOW 20 MIN: CPT | Performed by: PHYSICIAN ASSISTANT

## 2021-07-07 PROCEDURE — 73564 X-RAY EXAM KNEE 4 OR MORE: CPT

## 2021-07-07 NOTE — PROGRESS NOTES
[address] Reviewed Now        NAME: Tiana Benoit is a 73 y o  male  : 1955    MRN: [id number]  DATE: 2021  TIME: 1:16 PM    Assessment and Plan   Knee injury, right, initial encounter [S89 [id number]]  1  Knee injury, right, initial encounter  XR knee 4+ vw right injury    Ambulatory referral to Orthopedic Surgery         Patient Instructions     Recommend RICE, OTC ibuprofen/tylenol  Referred to Orthopedics  Monitor for worsening symptoms  ACE wrap applied  Follow up with PCP in 3-5 days  Proceed to  ER if symptoms worsen  Chief Complaint     Chief Complaint   Patient presents with    Knee Pain     fall 3 days ago  Knee started to swell 2 days ago  History of Present Illness       Patient presents with complaint of right knee injury 3 days ago  Patient states that he fell and landed on the front of his knee  Patient states that he has since developed pain and swelling  Patient reports that he has been icing and resting the limb  He denies paresthesias and radiation of pain  Patient denies twisting the knee or other known injury to the joint  Review of Systems   Review of Systems   Constitutional: Negative for chills and fever  HENT: Negative for ear pain and sore throat  Eyes: Negative for pain and visual disturbance  Respiratory: Negative for cough and shortness of breath  Cardiovascular: Negative for chest pain and palpitations  Gastrointestinal: Negative for abdominal pain and vomiting  Genitourinary: Negative for dysuria and hematuria  Musculoskeletal: Positive for arthralgias and joint swelling  Negative for back pain  Skin: Negative for color change and rash  Neurological: Negative for seizures and syncope  All other systems reviewed and are negative          Current Medications       Current Outpatient Medications:     albuterol (PROVENTIL HFA,VENTOLIN HFA) 90 mcg/act inhaler, Inhale 2 puffs every 6 (six) hours as needed for wheezing or shortness of breath, Disp: 1 Inhaler, Rfl: 5    ASPIRIN 81 PO, Take by mouth 1  daily, Disp: , Rfl:     b complex vitamins capsule, Take 1 capsule by mouth daily, Disp: 30 capsule, Rfl: 6    buPROPion (WELLBUTRIN SR) 150 mg 12 hr tablet, 1 daily, Disp: , Rfl:     buPROPion (WELLBUTRIN XL) 300 mg 24 hr tablet, Take 300 mg by mouth daily, Disp: , Rfl:     Cholecalciferol (VITAMIN D3) 5000 units CAPS, Take by mouth 1 daily, Disp: , Rfl:     folic acid (FOLVITE) 1 mg tablet, Take 1 tablet (1 mg total) by mouth daily, Disp: 30 tablet, Rfl: 6    guaiFENesin (ROBITUSSIN) 100 MG/5ML oral liquid, Take 10 mL (200 mg total) by mouth 3 (three) times a day as needed for cough, Disp: 120 mL, Rfl: 0    haloperidol (HALDOL) 1 mg tablet, Take 0 5 mg by mouth 3 (three) times a day, Disp: , Rfl:     ibuprofen (MOTRIN) 200 mg tablet, Take by mouth every 6 (six) hours as needed for mild pain, Disp: , Rfl:     lurasidone (LATUDA) 80 mg tablet, Take 120 mg by mouth daily with breakfast , Disp: , Rfl:     metFORMIN (GLUCOPHAGE) 500 mg tablet, Take 500 mg by mouth 2 (two) times a day with meals, Disp: , Rfl:     Multiple Vitamins-Minerals (MULTIVITAMIN WITH MINERALS) tablet, Take 1 tablet by mouth daily, Disp: , Rfl:     Naloxone HCl (NARCAN NA), into each nostril Administer nasally when suspected overdose, Disp: , Rfl:     omega-3-acid ethyl esters (LOVAZA) 1 g capsule, Take 2 g by mouth daily, Disp: , Rfl:     sertraline (ZOLOFT) 100 mg tablet, 1 daily, Disp: , Rfl:     tamsulosin (FLOMAX) 0 4 mg, Take 1 capsule (0 4 mg total) by mouth daily with dinner, Disp: 90 capsule, Rfl: 3    Na Sulfate-K Sulfate-Mg Sulf (Suprep Bowel Prep Kit) 17 5-3 13-1 6 GM/177ML SOLN, As directed (1 kit) (Patient not taking: Reported on 5/5/2021), Disp: 2 Bottle, Rfl: 0    Current Allergies     Allergies as of 07/07/2021    (No Known Allergies)            The following portions of the patient's history were reviewed and updated as appropriate: allergies, current medications, past family history, past medical history, past social history, past surgical history and problem list      Past Medical History:   Diagnosis Date    Atopic rhinitis     BPH with urinary obstruction     [facility]     Diabetes mellitus ([facility] )     Hyperlipidemia     Incomplete bladder emptying     Schizophrenia ([facility] )        No past surgical history on file  Family History   Problem Relation Age of Onset    Substance Abuse Sister         cigarettes    Substance Abuse Brother     Alcohol abuse Brother          Medications have been verified  Objective   /70   Pulse 79   Temp 97 8 °F (36 6 °C)   Resp 18   Ht 6' 4" (1 93 m)   Wt 78 kg (172 lb)   SpO2 99%   BMI 20 94 kg/m²   No LMP for male patient  Physical Exam     Physical Exam  Vitals and nursing note reviewed  Constitutional:       General: He is not in acute distress  Appearance: Normal appearance  He is well-developed  He is not ill-appearing or diaphoretic  HENT:      Head: Normocephalic and atraumatic  Eyes:      Conjunctiva/sclera: Conjunctivae normal       Pupils: Pupils are equal, round, and reactive to light  Cardiovascular:      Rate and Rhythm: Normal rate and regular rhythm  Heart sounds: Normal heart sounds  Pulmonary:      Effort: Pulmonary effort is normal  No respiratory distress  Breath sounds: Normal breath sounds  Musculoskeletal:         General: Swelling and tenderness present  Cervical back: Normal range of motion and neck supple  Comments: Right knee:  Diffuse swelling, no erythema or wounds; active range of motion with slightly limited flexion due to swelling; tender to palpation over anterior aspect, lateral joint line, and posteriorly; distal sensation intact; slightly limping gait   Lymphadenopathy:      Cervical: No cervical adenopathy  Skin:     General: Skin is warm and dry        Capillary Refill: Capillary refill takes less than 2 seconds  Findings: No rash  Neurological:      Mental Status: He is alert and oriented to person, place, and time  Cranial Nerves: No cranial nerve deficit  Sensory: No sensory deficit  Psychiatric:         Behavior: Behavior normal          Thought Content:  Thought content normal

## 2021-08-10 NOTE — PROGRESS NOTES
8/11/2021    Shira Bashir  1955  [id number]      Assessment  -BPH with lower urinary tract symptoms  -Routine prostate cancer screening    Discussion/Plan  Alok Saucedo is a 73 y o  male who presents in consultation    1  BPH with lower urinary tract symptoms-   PVR in the office today is 372 mL  He is in no acute distress and there is no evidence of suprapubic discomfort or distention  We discussed dietary and behavior modifications including avoiding bladder irritants and practicing timed and double voiding  He will remain on tamsulosin 0 4 mg HS  This is managed by his PCP  Will avoid invasive testing unless necessary  Because he has had no recent urinary tract infections and creatinine stable, recommend follow-up in 3 months with nursing staff to repeat PVR assessment  2  Routine prostate cancer screening-   We reviewed the results of his recent PSA from March 2021 which was 0 49 , previously 0 4  There were no abnormal findings noted on digital rectal examination today  Continue annual prostate cancer screening  PSA ordered by his PCP  follow-up in 3 months for PVR assessment with nurse  Instructions provided to facility  They were advised to call sooner with any issues     -All questions answered, patient and caretaker agrees with plan      History of Present Illness  73 y o  male presents in follow up today for evaluation of BPH  He was last evaluated via telemedicine visit in April 2020  Patient resides in a skilled facility with history of schizophrenia and is accompanied today by caretaker  He has been on tamsulosin 0 4 mg HS for management of nocturia and postvoid dribbling  This is managed by his PCP  Patient denies any episodes of gross hematuria or dysuria  He is noted to have an umbilical hernia  Patient states he primarily drinks coffee, soda, and water throughout the day  Last PSA from March 2021 was 0 49  Patient unsure if he has a family history of prostate cancer    No changes to his overall health  Review of Systems  Review of Systems   Constitutional: Negative  HENT: Negative  Respiratory: Negative  Cardiovascular: Negative  Gastrointestinal: Negative  Genitourinary: Positive for frequency  Negative for decreased urine volume, difficulty urinating, dysuria, flank pain, hematuria and urgency  Musculoskeletal: Negative  Skin: Negative  Neurological: Negative  Psychiatric/Behavioral: Negative  AUA SYMPTOM SCORE      Most Recent Value   AUA SYMPTOM SCORE   How often have you had a sensation of not emptying your bladder completely after you finished urinating? 3   How often have you had to urinate again less than two hours after you finished urinating? 3   How often have you found you stopped and started again several times when you urinate? 3   How often have you found it difficult to postpone urination? 3   How often have you had a weak urinary stream?  0   How often have you had to push or strain to begin urination? 0   How many times did you most typically get up to urinate from the time you went to bed at night until the time you got up in the morning? 2   Quality of Life: If you were to spend the rest of your life with your urinary condition just the way it is now, how would you feel about that?  5   AUA SYMPTOM SCORE  14          Past Medical History  Past Medical History:   Diagnosis Date    Atopic rhinitis     BPH with urinary obstruction     [facility]     [facility]     Hyperlipidemia     Incomplete bladder emptying     Sch[facility]ia ([facility] )        Past Social History  No past surgical history on file      Past Family History  Family History   Problem Relation Age of Onset    Substance Abuse Sister         cigarettes    Substance Abuse Brother     Alcohol abuse Brother        Past Social history  Social History     Socioeconomic History    Marital status: Single     Spouse name: Not on file    Number of children: Not on file    Years of education: Not on file    Highest education level: Not on file   Occupational History    Not on file   Tobacco Use    Smoking status: Current Every Day Smoker     Packs/day: 1 00     Years: 45 00     Pack years: 45 00    Smokeless tobacco: Never Used   Vaping Use    Vaping Use: Never used   Substance and Sexual Activity    Alcohol use: No     Comment: hx  of hard liquor, regular use, quit at age 33    Deepti Cabrera Drug use: No    Sexual activity: Not on file   Other Topics Concern    Not on file   Social History Narrative    [facility]    Feels safe where he lives    No living will    Sees  Dentist occas      Social Determinants of Health     Financial Resource Strain:     Difficulty of Paying Living Expenses:    Food Insecurity:     Worried About [address] in the Last Year:     [employer] in the Last Year:    Transportation Needs: No Transportation Needs    Lack of Transportation (Medical): No    Lack of Transportation (Non-Medical):  No   Physical Activity: Inactive    Days of Exercise per Week: 0 days    Minutes of Exercise per Session: 0 min   Stress: Stress Concern Present    Feeling of Stress : Rather much   Social Connections:     Frequency of Communication with Friends and Family:     Frequency of Social Gatherings with Friends and Family:     Attends Jainism Services:     Active Member of Clubs or Organizations:     Attends Club or Organization Meetings:     Marital Status:    Intimate Partner Violence: Not At Risk    Fear of Current or Ex-Partner: No    Emotionally Abused: No    Physically Abused: No    Sexually Abused: No       Current Medications  Current Outpatient Medications   Medication Sig Dispense Refill    albuterol (PROVENTIL HFA,VENTOLIN HFA) 90 mcg/act inhaler Inhale 2 puffs every 6 (six) hours as needed for wheezing or shortness of breath 1 Inhaler 5    ASPIRIN 81 PO Take by mouth 1 daily      b complex vitamins capsule Take 1 capsule by mouth daily 30 capsule 6    buPROPion (WELLBUTRIN SR) 150 mg 12 hr tablet 1 daily      buPROPion (WELLBUTRIN XL) 300 mg 24 hr tablet Take 300 mg by mouth daily      Cholecalciferol (VITAMIN D3) 5000 units CAPS Take by mouth 1 daily      folic acid (FOLVITE) 1 mg tablet Take 1 tablet (1 mg total) by mouth daily 30 tablet 6    guaiFENesin (ROBITUSSIN) 100 MG/5ML oral liquid Take 10 mL (200 mg total) by mouth 3 (three) times a day as needed for cough 120 mL 0    haloperidol (HALDOL) 1 mg tablet Take 0 5 mg by mouth 3 (three) times a day      ibuprofen (MOTRIN) 200 mg tablet Take by mouth every 6 (six) hours as needed for mild pain      lurasidone (LATUDA) 80 mg tablet Take 120 mg by mouth daily with breakfast       metFORMIN (GLUCOPHAGE) 500 mg tablet Take 500 mg by mouth 2 (two) times a day with meals      Multiple Vitamins-Minerals (MULTIVITAMIN WITH MINERALS) tablet Take 1 tablet by mouth daily      Na Sulfate-K Sulfate-Mg Sulf (Suprep Bowel Prep Kit) 17 5-3 13-1 6 GM/177ML SOLN As directed (1 kit) (Patient not taking: Reported on 5/5/2021) 2 Bottle 0    Naloxone HCl (NARCAN NA) into each nostril Administer nasally when suspected overdose      omega-3-acid ethyl esters (LOVAZA) 1 g capsule Take 2 g by mouth daily      sertraline (ZOLOFT) 100 mg tablet 1 daily      tamsulosin (FLOMAX) 0 4 mg Take 1 capsule (0 4 mg total) by mouth daily with dinner 90 capsule 3     No current facility-administered medications for this visit  Allergies  No Known Allergies    Past Medical History, Social History, Family History, medications and allergies were reviewed  Vitals  There were no vitals filed for this visit  Physical Exam  Physical Exam  Constitutional:       Appearance: Normal appearance  He is well-developed  HENT:      Head: Normocephalic  Eyes:      Pupils: Pupils are equal, round, and reactive to light     Pulmonary:      Effort: Pulmonary effort is normal    Abdominal:      Palpations: Abdomen is soft  Genitourinary:     Penis: Normal        Prostate: Normal       Rectum: Normal       Comments: Penis circumcised, prostate 40 g, smooth, nontender, no nodules  Umbilical hernia noted  Musculoskeletal:         General: Normal range of motion  Cervical back: Normal range of motion  Skin:     General: Skin is warm and dry  Neurological:      General: No focal deficit present  Mental Status: He is alert and oriented to person, place, and time  Psychiatric:         Mood and Affect: Mood normal          Judgment: Judgment normal          Results    I have personally reviewed all pertinent lab results and reviewed with patient  No results found for: PSA  No results found for: GLUCOSE, CALCIUM, NA, K, CO2, CL, BUN, CREATININE  No results found for: WBC, HGB, HCT, MCV, PLT  No results found for this or any previous visit (from the past 1 hour(s))

## 2021-08-11 ENCOUNTER — CONSULT (OUTPATIENT)
Dept: UROLOGY | Facility: HOSPITAL | Age: 66
End: 2021-08-11
Payer: MEDICARE

## 2021-08-11 ENCOUNTER — OFFICE VISIT (OUTPATIENT)
Dept: OBGYN CLINIC | Facility: CLINIC | Age: 66
End: 2021-08-11
Payer: MEDICARE

## 2021-08-11 ENCOUNTER — TELEPHONE (OUTPATIENT)
Dept: UROLOGY | Facility: HOSPITAL | Age: 66
End: 2021-08-11

## 2021-08-11 ENCOUNTER — APPOINTMENT (OUTPATIENT)
Dept: RADIOLOGY | Facility: CLINIC | Age: 66
End: 2021-08-11
Payer: MEDICARE

## 2021-08-11 VITALS
WEIGHT: 172 LBS | DIASTOLIC BLOOD PRESSURE: 70 MMHG | SYSTOLIC BLOOD PRESSURE: 125 MMHG | BODY MASS INDEX: 20.95 KG/M2 | HEIGHT: 76 IN

## 2021-08-11 VITALS
SYSTOLIC BLOOD PRESSURE: 112 MMHG | HEART RATE: 94 BPM | HEIGHT: 76 IN | BODY MASS INDEX: 20.95 KG/M2 | WEIGHT: 172 LBS | DIASTOLIC BLOOD PRESSURE: 62 MMHG

## 2021-08-11 DIAGNOSIS — M25.561 RIGHT KNEE PAIN, UNSPECIFIED CHRONICITY: ICD-10-CM

## 2021-08-11 DIAGNOSIS — R35.1 BENIGN PROSTATIC HYPERPLASIA WITH NOCTURIA: Primary | ICD-10-CM

## 2021-08-11 DIAGNOSIS — N40.1 BENIGN PROSTATIC HYPERPLASIA WITH NOCTURIA: Primary | ICD-10-CM

## 2021-08-11 DIAGNOSIS — Z12.5 SCREENING FOR PROSTATE CANCER: ICD-10-CM

## 2021-08-11 DIAGNOSIS — M17.11 PRIMARY OSTEOARTHRITIS OF RIGHT KNEE: Primary | ICD-10-CM

## 2021-08-11 LAB
POST-VOID RESIDUAL VOLUME, ML POC: 374 ML
POST-VOID RESIDUAL VOLUME, ML POC: 922 ML
SL AMB  POCT GLUCOSE, UA: NORMAL
SL AMB LEUKOCYTE ESTERASE,UA: NORMAL
SL AMB POCT BILIRUBIN,UA: NORMAL
SL AMB POCT BLOOD,UA: NORMAL
SL AMB POCT CLARITY,UA: CLEAR
SL AMB POCT COLOR,UA: YELLOW
SL AMB POCT KETONES,UA: NORMAL
SL AMB POCT NITRITE,UA: NORMAL
SL AMB POCT PH,UA: 5
SL AMB POCT SPECIFIC GRAVITY,UA: 1.01
SL AMB POCT URINE PROTEIN: NORMAL
SL AMB POCT UROBILINOGEN: 0.2

## 2021-08-11 PROCEDURE — 99203 OFFICE O/P NEW LOW 30 MIN: CPT | Performed by: ORTHOPAEDIC SURGERY

## 2021-08-11 PROCEDURE — 81002 URINALYSIS NONAUTO W/O SCOPE: CPT | Performed by: NURSE PRACTITIONER

## 2021-08-11 PROCEDURE — 20610 DRAIN/INJ JOINT/BURSA W/O US: CPT | Performed by: ORTHOPAEDIC SURGERY

## 2021-08-11 PROCEDURE — 51798 US URINE CAPACITY MEASURE: CPT | Performed by: NURSE PRACTITIONER

## 2021-08-11 PROCEDURE — 99213 OFFICE O/P EST LOW 20 MIN: CPT | Performed by: NURSE PRACTITIONER

## 2021-08-11 PROCEDURE — 73560 X-RAY EXAM OF KNEE 1 OR 2: CPT

## 2021-08-11 RX ORDER — BETAMETHASONE SODIUM PHOSPHATE AND BETAMETHASONE ACETATE 3; 3 MG/ML; MG/ML
6 INJECTION, SUSPENSION INTRA-ARTICULAR; INTRALESIONAL; INTRAMUSCULAR; SOFT TISSUE
Status: COMPLETED | OUTPATIENT
Start: 2021-08-11 | End: 2021-08-11

## 2021-08-11 RX ORDER — LIDOCAINE HYDROCHLORIDE 5 MG/ML
0.5 INJECTION, SOLUTION INFILTRATION; PERINEURAL
Status: DISCONTINUED | OUTPATIENT
Start: 2021-08-11 | End: 2021-08-11

## 2021-08-11 RX ORDER — LIDOCAINE HYDROCHLORIDE 10 MG/ML
7 INJECTION, SOLUTION EPIDURAL; INFILTRATION; INTRACAUDAL; PERINEURAL
Status: COMPLETED | OUTPATIENT
Start: 2021-08-11 | End: 2021-08-11

## 2021-08-11 RX ADMIN — LIDOCAINE HYDROCHLORIDE 7 ML: 10 INJECTION, SOLUTION EPIDURAL; INFILTRATION; INTRACAUDAL; PERINEURAL at 10:20

## 2021-08-11 RX ADMIN — BETAMETHASONE SODIUM PHOSPHATE AND BETAMETHASONE ACETATE 6 MG: 3; 3 INJECTION, SUSPENSION INTRA-ARTICULAR; INTRALESIONAL; INTRAMUSCULAR; SOFT TISSUE at 09:55
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
## 2021-08-11 NOTE — ASSESSMENT & PLAN NOTE
28-year-old male with right knee osteoarthritis  X-rays were reviewed  I discussed with him that he likely flared up his arthritis with the fall  Treatment options were discussed in the form of a steroid injection  He was agreeable to this  Patient consented and underwent a right knee steroid injection in the office today without any complications  Post injection instructions were provided  Activity modification was discussed in the form of avoiding squatting and high impact activities  He will follow up in 3 months for repeat evaluation  Patient aware we can repeat these injections every 3 months if needed  All patient's questions were answered to his satisfaction  This note is created using dictation transcription  It may contain typographical errors, grammatical errors, improperly dictated words, background noise and other errors

## 2021-08-16 ENCOUNTER — OFFICE VISIT (OUTPATIENT)
Dept: FAMILY MEDICINE CLINIC | Facility: HOSPITAL | Age: 66
End: 2021-08-16
Payer: MEDICARE

## 2021-08-16 VITALS
OXYGEN SATURATION: 99 % | SYSTOLIC BLOOD PRESSURE: 120 MMHG | BODY MASS INDEX: 20.63 KG/M2 | HEIGHT: 76 IN | DIASTOLIC BLOOD PRESSURE: 70 MMHG | WEIGHT: 169.4 LBS | HEART RATE: 82 BPM

## 2021-08-16 DIAGNOSIS — Z48.02: ICD-10-CM

## 2021-08-16 DIAGNOSIS — S01.01XA LACERATION OF SCALP WITHOUT FOREIGN BODY, INITIAL ENCOUNTER: Primary | ICD-10-CM

## 2021-08-16 PROBLEM — F32.A DEPRESSIVE DISORDER: Status: ACTIVE | Noted: 2021-08-16

## 2021-08-16 PROCEDURE — 99213 OFFICE O/P EST LOW 20 MIN: CPT | Performed by: STUDENT IN AN ORGANIZED HEALTH CARE EDUCATION/TRAINING PROGRAM
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
## 2021-09-29 ENCOUNTER — TELEPHONE (OUTPATIENT)
Dept: GASTROENTEROLOGY | Facility: CLINIC | Age: 66
End: 2021-09-29

## 2021-09-29 ENCOUNTER — OFFICE VISIT (OUTPATIENT)
Dept: FAMILY MEDICINE CLINIC | Facility: HOSPITAL | Age: 66
End: 2021-09-29
Payer: MEDICARE

## 2021-09-29 ENCOUNTER — HOSPITAL ENCOUNTER (OUTPATIENT)
Dept: RADIOLOGY | Facility: HOSPITAL | Age: 66
Discharge: HOME | End: 2021-09-29
Attending: STUDENT IN AN ORGANIZED HEALTH CARE EDUCATION/TRAINING PROGRAM
Payer: MEDICARE

## 2021-09-29 VITALS
HEART RATE: 82 BPM | SYSTOLIC BLOOD PRESSURE: 126 MMHG | BODY MASS INDEX: 20.82 KG/M2 | OXYGEN SATURATION: 97 % | DIASTOLIC BLOOD PRESSURE: 80 MMHG | WEIGHT: 171 LBS | HEIGHT: 76 IN

## 2021-09-29 DIAGNOSIS — Z12.2 ENCOUNTER FOR SCREENING FOR LUNG CANCER: ICD-10-CM

## 2021-09-29 DIAGNOSIS — F17.210 NICOTINE DEPENDENCE, CIGARETTES, UNCOMPLICATED: ICD-10-CM

## 2021-09-29 DIAGNOSIS — L97.521 CHRONIC ULCER OF GREAT TOE OF LEFT FOOT, LIMITED TO BREAKDOWN OF SKIN (HCC): ICD-10-CM

## 2021-09-29 DIAGNOSIS — Z12.11 SCREENING FOR COLON CANCER: ICD-10-CM

## 2021-09-29 DIAGNOSIS — E11.9 TYPE 2 DIABETES MELLITUS WITHOUT COMPLICATION, WITHOUT LONG-TERM CURRENT USE OF INSULIN (HCC): ICD-10-CM

## 2021-09-29 DIAGNOSIS — Z23 NEED FOR INFLUENZA VACCINATION: Primary | ICD-10-CM

## 2021-09-29 DIAGNOSIS — M70.22 OLECRANON BURSITIS, LEFT ELBOW: ICD-10-CM

## 2021-09-29 DIAGNOSIS — E44.1 MILD PROTEIN-CALORIE MALNUTRITION (HCC): ICD-10-CM

## 2021-09-29 DIAGNOSIS — Z72.0 TOBACCO USE: ICD-10-CM

## 2021-09-29 DIAGNOSIS — F20.9 SCHIZOPHRENIA, UNSPECIFIED TYPE (HCC): ICD-10-CM

## 2021-09-29 DIAGNOSIS — E78.49 OTHER HYPERLIPIDEMIA: ICD-10-CM

## 2021-09-29 DIAGNOSIS — J44.1 CHRONIC OBSTRUCTIVE PULMONARY DISEASE WITH ACUTE EXACERBATION (HCC): ICD-10-CM

## 2021-09-29 LAB — SL AMB POCT HEMOGLOBIN AIC: 5.7 (ref ?–6.5)

## 2021-09-29 PROCEDURE — 99215 OFFICE O/P EST HI 40 MIN: CPT | Performed by: STUDENT IN AN ORGANIZED HEALTH CARE EDUCATION/TRAINING PROGRAM

## 2021-09-29 PROCEDURE — 73080 X-RAY EXAM OF ELBOW: CPT

## 2021-09-29 PROCEDURE — 90662 IIV NO PRSV INCREASED AG IM: CPT

## 2021-09-29 PROCEDURE — G0008 ADMIN INFLUENZA VIRUS VAC: HCPCS

## 2021-09-29 PROCEDURE — 83036 HEMOGLOBIN GLYCOSYLATED A1C: CPT | Performed by: STUDENT IN AN ORGANIZED HEALTH CARE EDUCATION/TRAINING PROGRAM

## 2021-09-29 NOTE — PROGRESS NOTES
[facility],     Assessment/Plan:      Diagnosis ICD-10-CM Associated Orders   1  Need for influenza vaccination  Z23 influenza vaccine, high-dose, PF 0 7 mL (FLUZONE HIGH-DOSE)   2  Type 2 diabetes mellitus without complication, without long-term current use of insulin (HCC)  E11 9 POCT hemoglobin A1c     Ambulatory referral to Podiatry     Ambulatory referral to Ophthalmology   3  Chronic obstructive pulmonary disease with acute exacerbation (HCC)  J44 1    4  Olecranon bursitis, left elbow  M70 22 XR elbow 3+ vw left   5  Other hyperlipidemia  E78 49    6  Schizophrenia, unspecified type ([facility] [address] )  F20 9    7  Screening for colon cancer  Z12 11 Ambulatory referral to Gastroenterology   8  Tobacco use  Z72 0 CT lung screening program   9  Mild protein-calorie malnutrition (HCC)  E44 1    10  Encounter for screening for lung cancer  Z12 2 CT lung screening program   11  Nicotine dependence, cigarettes, uncomplicated   Q12 417 CT lung screening program   12  Chronic ulcer of great toe of left foot, limited to breakdown of skin [facility])  L92 521 Ambulatory referral to Podiatry      Patient has evidence of olecranon bursitis of the left elbow, recommended compression sleeve, but would not drain or at corticosteroid given high risk of infection and nonhealing due to high flexion surface, and patient factors  Can reassess as needed   No blood work ordered at this time as performed in March and grossly normal    A1c however was completed today and has improved to 5 7   Referrals placed for ophthalmology for patient to have diabetic eye exam performed   Referral also placed for podiatry, diabetic foot exam performed today however on exam patient had small left medial ulceration and should follow with podiatry for further management   Colonoscopy done in 2020, but recommended at 1 year follow-up due to poor prep     Patient is a candidate for CT lung cancer screening program given his nearly 50 pack years   Ask for documentation from [facility] staff about COVID vaccines, patient is not a reliable historian   Flu vaccine provided today, pt should have Pneumonia 23 vaccine provided at next visit  Return for Annual Wellness Visit - Medicare after 5/6/2022   Patient may call or return to office with any questions or concerns  ______________________________________________________________________  Subjective:     Patient ID: Kade Lopez is a 73 y o  male  HPI   Chief Complaint   Patient presents with    Annual Exam     Kade Lopez is a 67-year-old male with a history of COPD, type 2 diabetes, BPH, hyperlipidemia, schizophrenia, depression, and tobacco use who presents today for his documentation of medical evaluation forms to be completed  A1c 6 2 in March  TChol elevated 225, , CMP normal, WBC & Hb normal  HIV negative        Falls Plan of Care: balance, strength, and gait training instructions were provided  Tobacco Cessation Counseling: Tobacco cessation counseling was provided  The patient is sincerely urged to quit consumption of tobacco  He is not ready to quit tobacco  Medication options and side effects of medication discussed  Patient refused medication  Refused patch, has mental health difficulties  BMI Counseling: Body mass index is 20 81 kg/m²  The BMI is normal      The following portions of the patient's history were reviewed and updated as appropriate: allergies, current medications, past family history, past medical history, past social history, past surgical history and problem list     Review of Systems   Unable to perform ROS: Psychiatric disorder   Constitutional: Negative for chills and fever  HENT: Negative for congestion and sore throat  Eyes: Negative for pain and redness  Respiratory: Negative for cough and shortness of breath  Cardiovascular: Negative for chest pain and palpitations     Gastrointestinal: Negative for nausea and vomiting  Genitourinary: Negative for dysuria and urgency  Musculoskeletal: Negative for gait problem  Left Elbow swelling   Skin: Negative for rash and wound  Neurological: Negative for light-headedness and numbness  Psychiatric/Behavioral: Negative for decreased concentration  The patient is not nervous/anxious  Objective:      Vitals:    09/29/21 0911   BP: 126/80   Pulse: 82   SpO2: 97%      Physical Exam  Vitals reviewed  Constitutional:       General: He is not in acute distress  Appearance: Normal appearance  He is well-developed and normal weight  He is not ill-appearing  HENT:      Head: Normocephalic and atraumatic  Eyes:      General: No scleral icterus  Right eye: No discharge  Left eye: No discharge  Cardiovascular:      Rate and Rhythm: Normal rate and regular rhythm  Pulses: Normal pulses  no weak pulses          Dorsalis pedis pulses are 2+ on the right side and 2+ on the left side  Heart sounds: Normal heart sounds  No murmur heard  No friction rub  No gallop  Pulmonary:      Effort: Pulmonary effort is normal  No respiratory distress  Breath sounds: Normal breath sounds  No stridor  No wheezing  Abdominal:      General: Abdomen is flat  Bowel sounds are normal       Palpations: Abdomen is soft  Hernia: A hernia (Umbilical, soft, reducible) is present  Musculoskeletal:      Cervical back: Normal range of motion  Comments: Left elbow has prominent olecranon bursitis, minimally tender to palpation, no erythema, discharge, warmth or drainage  Full flexion and extension intact, full sensation intact normal bilateral radial pulses   Feet:      Right foot:      Skin integrity: No ulcer, skin breakdown, erythema, warmth, callus or dry skin  Left foot:      Skin integrity: Ulcer (medial) and dry skin present  No skin breakdown, erythema, warmth or callus  Skin:     General: Skin is warm and dry     Neurological: Mental Status: He is alert and oriented to person, place, and time  Coordination: Coordination normal       Gait: Gait normal    Psychiatric:         Mood and Affect: Mood normal          Behavior: Behavior normal       Comments: Thought content is illogical and tangential secondary to schizophrenia           Patient's shoes and socks removed  Right Foot/Ankle   Right Foot Inspection  Skin Exam: skin normal skin not intact, no dry skin, no warmth, no callus, no erythema, no maceration, no abnormal color, no pre-ulcer, no ulcer and no callus                          Toe Exam: ROM and strength within normal limits    Vascular  Capillary refills: < 3 seconds  The right DP pulse is 2+  Left Foot/Ankle  Left Foot Inspection  Skin Exam: skin normal, skin intact, dry skin and ulcer (medial)no warmth, no erythema, no maceration, normal color, no pre-ulcer and no callus              Ulcer Size (cm): 0 5           Toe Exam: ROM and strength within normal limits                     Vascular  Capillary refills: < 3 seconds  The left DP pulse is 2+  Assign Risk Category:  No deformity present; No loss of protective sensation; No weak pulses       Risk: 0      Portions of the record may have been created with voice recognition software  Occasional wrong word or "sound alike" substitutions may have occurred due to the inherent limitations of voice recognition software  Please review the chart carefully and recognize, using context, where substitutions/typographical errors may have occurred

## 2021-09-29 NOTE — PATIENT INSTRUCTIONS
Fall Prevention   AMBULATORY CARE:   Fall prevention  includes ways to make your home and other areas safer  It also includes ways you can move more carefully to prevent a fall  Health conditions that cause changes in your blood pressure, vision, or muscle strength and coordination may increase your risk for falls  Medicines may also increase your risk for falls if they make you dizzy, weak, or sleepy  Call 911 or have someone else call if:   · You have fallen and are unconscious  · You have fallen and cannot move part of your body  Contact your healthcare provider if:   · You have fallen and have pain or a headache  · You have questions or concerns about your condition or care  Fall prevention tips:   · Stand or sit up slowly  This may help you keep your balance and prevent falls  · Use assistive devices as directed  Your healthcare provider may suggest that you use a cane or walker to help you keep your balance  You may need to have grab bars put in your bathroom near the toilet or in the shower  · Wear shoes that fit well and have soles that   Wear shoes both inside and outside  Use slippers with good   Do not wear shoes with high heels  · Wear a personal alarm  This is a device that allows you to call 911 if you fall and need help  Ask your healthcare provider for more information  · Stay active  Exercise can help strengthen your muscles and improve your balance  Your healthcare provider may recommend water aerobics or walking  He or she may also recommend physical therapy to improve your coordination  Never start an exercise program without talking to your healthcare provider first          · Manage your medical conditions  Keep all appointments with your healthcare providers  Visit your eye doctor as directed  Home safety tips:       · Add items to prevent falls in the bathroom  Put nonslip strips on your bath or shower floor to prevent you from slipping   Use a bath mat if you do not have carpet in the bathroom  This will prevent you from falling when you step out of the bath or shower  Use a shower seat so you do not need to stand while you shower  Sit on the toilet or a chair in your bathroom to dry yourself and put on clothing  This will prevent you from losing your balance from drying or dressing yourself while you are standing  · Keep paths clear  Remove books, shoes, and other objects from walkways and stairs  Place cords for telephones and lamps out of the way so that you do not need to walk over them  Tape them down if you cannot move them  Remove small rugs  If you cannot remove a rug, secure it with double-sided tape  This will prevent you from tripping  · Install bright lights in your home  Use night lights to help light paths to the bathroom or kitchen  Always turn on the light before you start walking  · Keep items you use often on shelves within reach  Do not use a step stool to help you reach an item  · Paint or place reflective tape on the edges of your stairs  This will help you see the stairs better  Follow up with your healthcare provider as directed:  Write down your questions so you remember to ask them during your visits  © Copyright [employer] 2021 Information is for End User's use only and may not be sold, redistributed or otherwise used for commercial purposes  All illustrations and images included in CareNotes® are the copyrighted property of A D A M , Inc  or Scott Renee  The above information is an  only  It is not intended as medical advice for individual conditions or treatments  Talk to your doctor, nurse or pharmacist before following any medical regimen to see if it is safe and effective for you

## 2021-09-29 NOTE — TELEPHONE ENCOUNTER
09/29/21  Screened by: Tacos Yen    Referring Provider Dina    Pre- Screening: There is no height or weight on file to calculate BMI  Has patient been referred for a routine screening Colonoscopy? yes  Is the patient between 39-70 years old? yes      Previous Colonoscopy no   If yes:    Date: no    Facility: no    Reason: no      SCHEDULING STAFF: If the patient is between 45yrs-49yrs, please advise patient to confirm benefits/coverage with their insurance company for a routine screening colonoscopy, some insurance carriers will only cover at [address] or older  If the patient is over 66years old, please schedule an office visit  Does the patient want to see a Gastroenterologist prior to their procedure OR are they having any GI symptoms? no    Has the patient been hospitalized or had abdominal surgery in the past 6 months? no    Does the patient use supplemental oxygen? no    Does the patient take Coumadin, Lovenox, Plavix, Elliquis, Xarelto, or other blood thinning medication? no    Has the patient had a stroke, cardiac event, or stent placed in the past year? no    SCHEDULING STAFF: If patient answers NO to above questions, then schedule procedure  If patient answers YES to above questions, then schedule office appointment  If patient is between 45yrs - 49yrs, please advise patient that we will have to confirm benefits & coverage with their insurance company for a routine screening colonoscopy

## 2021-10-11 ENCOUNTER — HOSPITAL ENCOUNTER (OUTPATIENT)
Dept: CT IMAGING | Facility: HOSPITAL | Age: 66
Discharge: HOME | End: 2021-10-11
Attending: STUDENT IN AN ORGANIZED HEALTH CARE EDUCATION/TRAINING PROGRAM | Admitting: STUDENT IN AN ORGANIZED HEALTH CARE EDUCATION/TRAINING PROGRAM
Payer: MEDICARE

## 2021-10-11 DIAGNOSIS — Z12.2 ENCOUNTER FOR SCREENING FOR LUNG CANCER: ICD-10-CM

## 2021-10-11 DIAGNOSIS — F17.210 NICOTINE DEPENDENCE, CIGARETTES, UNCOMPLICATED: ICD-10-CM

## 2021-10-11 DIAGNOSIS — Z72.0 TOBACCO USE: ICD-10-CM

## 2021-10-11 PROCEDURE — 71271 CT THORAX LUNG CANCER SCR C-: CPT

## 2021-10-12 ENCOUNTER — OFFICE VISIT (OUTPATIENT)
Dept: PODIATRY | Facility: CLINIC | Age: 66
End: 2021-10-12
Payer: MEDICARE

## 2021-10-12 ENCOUNTER — TELEPHONE (OUTPATIENT)
Dept: FAMILY MEDICINE CLINIC | Facility: HOSPITAL | Age: 66
End: 2021-10-12

## 2021-10-12 VITALS
BODY MASS INDEX: 20.95 KG/M2 | DIASTOLIC BLOOD PRESSURE: 76 MMHG | WEIGHT: 172 LBS | HEIGHT: 76 IN | SYSTOLIC BLOOD PRESSURE: 121 MMHG | HEART RATE: 89 BPM

## 2021-10-12 DIAGNOSIS — M20.41 HAMMER TOES OF BOTH FEET: ICD-10-CM

## 2021-10-12 DIAGNOSIS — E11.42 DIABETIC POLYNEUROPATHY ASSOCIATED WITH TYPE 2 DIABETES MELLITUS (HCC): Primary | ICD-10-CM

## 2021-10-12 DIAGNOSIS — M70.22 OLECRANON BURSITIS, LEFT ELBOW: Primary | ICD-10-CM

## 2021-10-12 DIAGNOSIS — F17.200 SMOKING: ICD-10-CM

## 2021-10-12 DIAGNOSIS — B35.1 TOENAIL FUNGUS: ICD-10-CM

## 2021-10-12 DIAGNOSIS — M20.42 HAMMER TOES OF BOTH FEET: ICD-10-CM

## 2021-10-12 PROCEDURE — 99203 OFFICE O/P NEW LOW 30 MIN: CPT | Performed by: PODIATRIST

## 2021-11-02 ENCOUNTER — OFFICE VISIT (OUTPATIENT)
Dept: OBGYN CLINIC | Facility: CLINIC | Age: 66
End: 2021-11-02
Payer: MEDICARE

## 2021-11-02 VITALS
BODY MASS INDEX: 20.22 KG/M2 | DIASTOLIC BLOOD PRESSURE: 70 MMHG | SYSTOLIC BLOOD PRESSURE: 125 MMHG | HEIGHT: 76 IN | WEIGHT: 166 LBS

## 2021-11-02 DIAGNOSIS — M70.22 OLECRANON BURSITIS OF LEFT ELBOW: Primary | ICD-10-CM

## 2021-11-02 PROCEDURE — 20605 DRAIN/INJ JOINT/BURSA W/O US: CPT | Performed by: ORTHOPAEDIC SURGERY

## 2021-11-02 PROCEDURE — 99213 OFFICE O/P EST LOW 20 MIN: CPT | Performed by: ORTHOPAEDIC SURGERY

## 2021-11-02 RX ORDER — BETAMETHASONE SODIUM PHOSPHATE AND BETAMETHASONE ACETATE 3; 3 MG/ML; MG/ML
6 INJECTION, SUSPENSION INTRA-ARTICULAR; INTRALESIONAL; INTRAMUSCULAR; SOFT TISSUE
Status: COMPLETED | OUTPATIENT
Start: 2021-11-02 | End: 2021-11-02

## 2021-11-02 RX ORDER — LIDOCAINE HYDROCHLORIDE 10 MG/ML
0.5 INJECTION, SOLUTION INFILTRATION; PERINEURAL
Status: COMPLETED | OUTPATIENT
Start: 2021-11-02 | End: 2021-11-02

## 2021-11-02 RX ADMIN — LIDOCAINE HYDROCHLORIDE 0.5 ML: 10 INJECTION, SOLUTION INFILTRATION; PERINEURAL at 15:51

## 2021-11-02 RX ADMIN — BETAMETHASONE SODIUM PHOSPHATE AND BETAMETHASONE ACETATE 6 MG: 3; 3 INJECTION, SUSPENSION INTRA-ARTICULAR; INTRALESIONAL; INTRAMUSCULAR; SOFT TISSUE at 16:11

## 2021-11-10 ENCOUNTER — TELEPHONE (OUTPATIENT)
Dept: UROLOGY | Facility: HOSPITAL | Age: 66
End: 2021-11-10

## 2021-11-10 ENCOUNTER — TELEPHONE (OUTPATIENT)
Dept: GASTROENTEROLOGY | Facility: CLINIC | Age: 66
End: 2021-11-10

## 2021-11-10 DIAGNOSIS — Z12.11 ENCOUNTER FOR SCREENING COLONOSCOPY: Primary | ICD-10-CM

## 2021-11-10 RX ORDER — POLYETHYLENE GLYCOL 3350 17 G/17G
238 POWDER, FOR SOLUTION ORAL ONCE
Qty: 238 G | Refills: 0 | Status: SHIPPED | OUTPATIENT
Start: 2021-11-10 | End: 2021-11-17 | Stop reason: HOSPADM

## 2021-11-16 ENCOUNTER — TELEPHONE (OUTPATIENT)
Dept: SURGERY | Facility: HOSPITAL | Age: 66
End: 2021-11-16

## 2021-11-16 ENCOUNTER — TELEPHONE (OUTPATIENT)
Dept: FAMILY MEDICINE CLINIC | Facility: HOSPITAL | Age: 66
End: 2021-11-16

## 2021-11-17 ENCOUNTER — ANESTHESIA (OUTPATIENT)
Dept: GASTROENTEROLOGY | Facility: HOSPITAL | Age: 66
End: 2021-11-17

## 2021-11-17 ENCOUNTER — HOSPITAL ENCOUNTER (OUTPATIENT)
Dept: GASTROENTEROLOGY | Facility: HOSPITAL | Age: 66
Setting detail: OUTPATIENT SURGERY
Discharge: HOME | End: 2021-11-17
Attending: INTERNAL MEDICINE
Payer: MEDICARE

## 2021-11-17 ENCOUNTER — ANESTHESIA EVENT (OUTPATIENT)
Dept: GASTROENTEROLOGY | Facility: HOSPITAL | Age: 66
End: 2021-11-17

## 2021-11-17 VITALS
HEART RATE: 62 BPM | DIASTOLIC BLOOD PRESSURE: 81 MMHG | RESPIRATION RATE: 16 BRPM | OXYGEN SATURATION: 99 % | SYSTOLIC BLOOD PRESSURE: 129 MMHG

## 2021-11-17 DIAGNOSIS — Z12.11 SCREENING FOR COLON CANCER: ICD-10-CM

## 2021-11-17 PROCEDURE — 45378 DIAGNOSTIC COLONOSCOPY: CPT | Performed by: INTERNAL MEDICINE

## 2021-11-17 RX ORDER — SODIUM CHLORIDE 9 MG/ML
INJECTION, SOLUTION INTRAVENOUS CONTINUOUS PRN
Status: DISCONTINUED | OUTPATIENT
Start: 2021-11-17 | End: 2021-11-17

## 2021-11-17 RX ORDER — PROPOFOL 10 MG/ML
INJECTION, EMULSION INTRAVENOUS AS NEEDED
Status: DISCONTINUED | OUTPATIENT
Start: 2021-11-17 | End: 2021-11-17

## 2021-11-17 RX ORDER — LIDOCAINE HYDROCHLORIDE 10 MG/ML
INJECTION, SOLUTION EPIDURAL; INFILTRATION; INTRACAUDAL; PERINEURAL AS NEEDED
Status: DISCONTINUED | OUTPATIENT
Start: 2021-11-17 | End: 2021-11-17

## 2021-11-17 RX ADMIN — PROPOFOL 150 MG: 10 INJECTION, EMULSION INTRAVENOUS at 11:24

## 2021-11-17 RX ADMIN — LIDOCAINE HYDROCHLORIDE 50 MG: 10 INJECTION, SOLUTION EPIDURAL; INFILTRATION; INTRACAUDAL; PERINEURAL at 11:24

## 2021-11-17 RX ADMIN — SODIUM CHLORIDE: 0.9 INJECTION, SOLUTION INTRAVENOUS at 11:18

## 2021-11-17 RX ADMIN — PROPOFOL 20 MG: 10 INJECTION, EMULSION INTRAVENOUS at 11:29

## 2021-11-17 RX ADMIN — PROPOFOL 30 MG: 10 INJECTION, EMULSION INTRAVENOUS at 11:27

## 2021-12-01 ENCOUNTER — TELEPHONE (OUTPATIENT)
Dept: FAMILY MEDICINE CLINIC | Facility: HOSPITAL | Age: 66
End: 2021-12-01

## 2021-12-08 ENCOUNTER — OFFICE VISIT (OUTPATIENT)
Dept: OBGYN CLINIC | Facility: CLINIC | Age: 66
End: 2021-12-08
Payer: MEDICARE

## 2021-12-08 VITALS
SYSTOLIC BLOOD PRESSURE: 138 MMHG | HEIGHT: 76 IN | WEIGHT: 176 LBS | DIASTOLIC BLOOD PRESSURE: 75 MMHG | BODY MASS INDEX: 21.43 KG/M2

## 2021-12-08 DIAGNOSIS — M70.22 OLECRANON BURSITIS OF LEFT ELBOW: Primary | ICD-10-CM

## 2021-12-08 PROCEDURE — 99213 OFFICE O/P EST LOW 20 MIN: CPT | Performed by: ORTHOPAEDIC SURGERY

## 2021-12-09 ENCOUNTER — OFFICE VISIT (OUTPATIENT)
Dept: FAMILY MEDICINE CLINIC | Facility: HOSPITAL | Age: 66
End: 2021-12-09
Payer: MEDICARE

## 2021-12-09 VITALS
SYSTOLIC BLOOD PRESSURE: 128 MMHG | WEIGHT: 174 LBS | HEART RATE: 80 BPM | DIASTOLIC BLOOD PRESSURE: 68 MMHG | HEIGHT: 76 IN | OXYGEN SATURATION: 97 % | BODY MASS INDEX: 21.19 KG/M2

## 2021-12-09 DIAGNOSIS — G44.309 POST-TRAUMATIC HEADACHE, NOT INTRACTABLE, UNSPECIFIED CHRONICITY PATTERN: Primary | ICD-10-CM

## 2021-12-09 DIAGNOSIS — F20.9 SCHIZOPHRENIA, UNSPECIFIED TYPE (HCC): ICD-10-CM

## 2021-12-09 PROCEDURE — 99213 OFFICE O/P EST LOW 20 MIN: CPT | Performed by: STUDENT IN AN ORGANIZED HEALTH CARE EDUCATION/TRAINING PROGRAM

## 2021-12-09 RX ORDER — ACETAMINOPHEN 500 MG
500 TABLET ORAL EVERY 8 HOURS PRN
Qty: 90 TABLET | Refills: 1 | Status: SHIPPED | OUTPATIENT
Start: 2021-12-09

## 2022-03-02 ENCOUNTER — HOSPITAL ENCOUNTER (EMERGENCY)
Facility: HOSPITAL | Age: 67
Discharge: HOME | End: 2022-03-02
Attending: EMERGENCY MEDICINE | Admitting: EMERGENCY MEDICINE
Payer: MEDICARE

## 2022-03-02 ENCOUNTER — APPOINTMENT (EMERGENCY)
Dept: RADIOLOGY | Facility: HOSPITAL | Age: 67
End: 2022-03-02
Payer: MEDICARE

## 2022-03-02 VITALS
DIASTOLIC BLOOD PRESSURE: 72 MMHG | SYSTOLIC BLOOD PRESSURE: 115 MMHG | HEIGHT: 72 IN | TEMPERATURE: 97.5 F | BODY MASS INDEX: 23.03 KG/M2 | WEIGHT: 170 LBS | OXYGEN SATURATION: 97 % | RESPIRATION RATE: 16 BRPM | HEART RATE: 70 BPM

## 2022-03-02 DIAGNOSIS — J20.9 ACUTE BRONCHITIS: Primary | ICD-10-CM

## 2022-03-02 PROCEDURE — 99285 EMERGENCY DEPT VISIT HI MDM: CPT | Performed by: EMERGENCY MEDICINE

## 2022-03-02 PROCEDURE — 99283 EMERGENCY DEPT VISIT LOW MDM: CPT

## 2022-03-02 PROCEDURE — 71046 X-RAY EXAM CHEST 2 VIEWS: CPT

## 2022-03-02 PROCEDURE — 0241U HB NFCT DS VIR RESP RNA 4 TRGT: CPT | Performed by: EMERGENCY MEDICINE

## 2022-03-02 RX ORDER — ALBUTEROL SULFATE 90 UG/1
2 AEROSOL, METERED RESPIRATORY (INHALATION) ONCE
Status: COMPLETED | OUTPATIENT
Start: 2022-03-02 | End: 2022-03-02

## 2022-03-02 RX ORDER — AZITHROMYCIN 250 MG/1
250 TABLET, FILM COATED ORAL EVERY 24 HOURS
Qty: 4 TABLET | Refills: 0 | Status: SHIPPED | OUTPATIENT
Start: 2022-03-03 | End: 2022-03-07

## 2022-03-02 RX ORDER — METHYLPREDNISOLONE 4 MG/1
TABLET ORAL
Qty: 21 TABLET | Refills: 0 | Status: SHIPPED | OUTPATIENT
Start: 2022-03-02 | End: 2022-03-11 | Stop reason: ALTCHOICE

## 2022-03-02 RX ORDER — PREDNISONE 20 MG/1
40 TABLET ORAL ONCE
Status: COMPLETED | OUTPATIENT
Start: 2022-03-02 | End: 2022-03-02

## 2022-03-02 RX ORDER — AZITHROMYCIN 500 MG/1
500 TABLET, FILM COATED ORAL ONCE
Status: COMPLETED | OUTPATIENT
Start: 2022-03-02 | End: 2022-03-02

## 2022-03-02 RX ADMIN — PREDNISONE 40 MG: 20 TABLET ORAL at 23:19

## 2022-03-02 RX ADMIN — ALBUTEROL SULFATE 2 PUFF: 90 AEROSOL, METERED RESPIRATORY (INHALATION) at 23:23

## 2022-03-02 RX ADMIN — AZITHROMYCIN 500 MG: 500 TABLET, FILM COATED ORAL at 23:19

## 2022-03-03 LAB
FLUAV RNA RESP QL NAA+PROBE: NEGATIVE
FLUBV RNA RESP QL NAA+PROBE: NEGATIVE
RSV RNA RESP QL NAA+PROBE: NEGATIVE
SARS-COV-2 RNA RESP QL NAA+PROBE: NEGATIVE

## 2022-03-03 NOTE — DISCHARGE INSTRUCTIONS
If the COVID or influenza test is positive, you can stop taking azithromycin, as it will not help with COVID or influenza  Follow up with your primary doctor ASAP  Quarantine until testing is resulted

## 2022-03-03 NOTE — ED PROVIDER NOTES
History  Chief Complaint   Patient presents with    Cough     patient presents to ED with persisent cough for weeks  72 yo M with PMH of COPD, HLD, schizophrenia, tobacco abuse presents to ED with staff from [facility] for eval of cough, ongoing for a few weeks  There is covid at his facility  No CP  No abd pain  Coughing up "green phlegm " no fevers  His meds are administered to him, and he has enough of all his medications  No leg pain/swelling  Ambulatory without issue  Pt is in no distress  Speaking in full sentences without dyspnea  History provided by:  Patient, medical records and caregiver   used: No    Cough  Cough characteristics:  Productive  Sputum characteristics:  Green  Severity:  Moderate  Onset quality:  Gradual  Timing:  Constant  Progression:  Unchanged  Chronicity:  New  Smoker: yes    Associated symptoms: shortness of breath    Associated symptoms: no chest pain, no chills, no diaphoresis, no ear pain, no fever, no headaches, no rash, no rhinorrhea and no sore throat        Prior to Admission Medications   Prescriptions Last Dose Informant Patient Reported? Taking?    ASPIRIN 81 PO  Outside Facility (Specify) Yes No   Sig: Take by mouth 1  daily    Cholecalciferol (VITAMIN D3) 5000 units CAPS  Outside Facility (Specify) Yes No   Sig: Take by mouth 1 daily   Multiple Vitamins-Minerals (MULTIVITAMIN WITH MINERALS) tablet  Outside Facility (Specify) Yes No   Sig: Take 1 tablet by mouth daily   Naloxone HCl (NARCAN NA)  Outside Facility (Specify) Yes No   Sig: into each nostril Administer nasally when suspected overdose   acetaminophen (TYLENOL) 500 mg tablet   No No   Sig: Take 1 tablet (500 mg total) by mouth every 8 (eight) hours as needed for mild pain or headaches   albuterol (PROVENTIL HFA,VENTOLIN HFA) 90 mcg/act inhaler  Outside Facility (Specify) No No   Sig: Inhale 2 puffs every 6 (six) hours as needed for wheezing or shortness of breath   b complex vitamins capsule  Outside Facility (Specify) No No   Sig: Take 1 capsule by mouth daily   buPROPion (WELLBUTRIN SR) 150 mg 12 hr tablet  Outside Facility (Specify) Yes No   Si daily   buPROPion (WELLBUTRIN XL) 300 mg 24 hr tablet  Outside Facility (Specify) Yes No   Sig: Take 300 mg by mouth daily   folic acid (FOLVITE) 1 mg tablet  Outside Facility (Specify) No No   Sig: Take 1 tablet (1 mg total) by mouth daily   haloperidol (HALDOL) 1 mg tablet  Outside Facility (Specify) Yes No   Sig: Take 0 5 mg by mouth 3 (three) times a day   ibuprofen (MOTRIN) 200 mg tablet  Outside Facility (Specify) Yes No   Sig: Take by mouth every 6 (six) hours as needed for mild pain   lurasidone (LATUDA) 80 mg tablet  Outside Facility (Specify) Yes No   Sig: Take 120 mg by mouth daily with breakfast    metFORMIN (GLUCOPHAGE) 500 mg tablet  Outside Facility (Specify) Yes No   Sig: Take 500 mg by mouth 2 (two) times a day with meals   omega-3-acid ethyl esters (LOVAZA) 1 g capsule  Outside Facility (Specify) Yes No   Sig: Take 2 g by mouth daily   sertraline (ZOLOFT) 100 mg tablet  Outside Facility (Specify) Yes No   Si daily   tamsulosin (FLOMAX) 0 4 mg  Outside Facility (Specify) No No   Sig: Take 1 capsule (0 4 mg total) by mouth daily with dinner      Facility-Administered Medications: None       Past Medical History:   Diagnosis Date    Atopic rhinitis     BPH with urinary obstruction     COPD (chronic obstructive pulmonary disease) ([facility] )     Diabetes mellitus ([facility] )     Hyperlipidemia     Incomplete bladder emptying     Schizophrenia ([facility] )        History reviewed  No pertinent surgical history  Family History   Problem Relation Age of Onset    Substance Abuse Sister         cigarettes    Substance Abuse Brother     Alcohol abuse Brother      I have reviewed and agree with the history as documented      E-Cigarette/Vaping    E-Cigarette Use Never User      E-Cigarette/Vaping Substances    Nicotine No     THC No     CBD No     Flavoring No     Other No     Unknown No      Social History     Tobacco Use    Smoking status: Current Every Day Smoker     Packs/day: 1 00     Years: 45 00     Pack years: 45 00    Smokeless tobacco: Never Used   Vaping Use    Vaping Use: Never used   Substance Use Topics    Alcohol use: No     Comment: hx  of hard liquor, regular use, quit at age 33    [facility] Drug use: No       Review of Systems   Constitutional: Positive for fatigue  Negative for chills, diaphoresis, fever and unexpected weight change  HENT: Negative for congestion, ear pain, rhinorrhea, sore throat, trouble swallowing and voice change  Eyes: Negative for pain and visual disturbance  Respiratory: Positive for cough and shortness of breath  Negative for chest tightness  Cardiovascular: Negative for chest pain, palpitations and leg swelling  Gastrointestinal: Negative for abdominal pain, blood in stool, constipation, diarrhea and vomiting  Genitourinary: Negative for difficulty urinating and hematuria  Musculoskeletal: Negative for arthralgias, back pain and neck pain  Skin: Negative for rash  Neurological: Negative for dizziness, syncope, light-headedness and headaches  Psychiatric/Behavioral: Negative for confusion and suicidal ideas  The patient is not nervous/anxious  Physical Exam  Physical Exam  Vitals and nursing note reviewed  Exam conducted with a chaperone present (staff from [facility])  Constitutional:       General: He is not in acute distress  Appearance: He is well-developed  He is not ill-appearing, toxic-appearing or diaphoretic  HENT:      Head: Normocephalic and atraumatic  Right Ear: External ear normal       Left Ear: External ear normal       Nose: Nose normal    Eyes:      General: No scleral icterus  Right eye: No discharge  Left eye: No discharge  Extraocular Movements: Extraocular movements intact        Conjunctiva/sclera: Conjunctivae normal  Pupils: Pupils are equal, round, and reactive to light  Neck:      Vascular: No JVD  Trachea: No tracheal deviation  Cardiovascular:      Rate and Rhythm: Normal rate and regular rhythm  Pulses: Normal pulses  Heart sounds: Normal heart sounds  No murmur heard  No friction rub  No gallop  Pulmonary:      Effort: Pulmonary effort is normal  No respiratory distress  Breath sounds: Normal breath sounds  No stridor  No wheezing or rales  Comments: Coarse expiratory breath sounds B/L  Chest:      Chest wall: No tenderness  Abdominal:      General: Bowel sounds are normal  There is no distension  Palpations: Abdomen is soft  Tenderness: There is no abdominal tenderness  There is no guarding or rebound  Musculoskeletal:         General: No swelling, tenderness or deformity  Normal range of motion  Cervical back: Normal range of motion and neck supple  No rigidity  Right lower leg: No edema  Left lower leg: No edema  Lymphadenopathy:      Cervical: No cervical adenopathy  Skin:     General: Skin is warm and dry  Findings: No rash  Neurological:      General: No focal deficit present  Mental Status: He is alert and oriented to person, place, and time  Cranial Nerves: No cranial nerve deficit  Sensory: No sensory deficit        Coordination: Coordination normal    Psychiatric:         Behavior: Behavior normal          Vital Signs  ED Triage Vitals [03/02/22 2037]   Temperature Pulse Respirations Blood Pressure SpO2   97 5 °F (36 4 °C) 80 18 120/70 98 %      Temp src Heart Rate Source Patient Position - Orthostatic VS BP Location FiO2 (%)   -- Monitor Sitting Left arm --      Pain Score       --           Vitals:    03/02/22 2037   BP: 120/70   Pulse: 80   Patient Position - Orthostatic VS: Sitting         Visual Acuity      ED Medications  Medications   azithromycin (ZITHROMAX) tablet 500 mg (has no administration in time range) predniSONE tablet 40 mg (has no administration in time range)   albuterol (PROVENTIL HFA,VENTOLIN HFA) inhaler 2 puff (has no administration in time range)       Diagnostic Studies  Results Reviewed     Procedure Component Value Units Date/Time    COVID/FLU/RSV - 2 hour TAT [[id number]]     Lab Status: No result Specimen: Nares from Nose                  XR chest 2 views   ED Interpretation by Michelle Daugherty MD (03/02 2219)   Appears similar to prior  No acute abnormality  Procedures  Procedures         ED Course                                             MDM  Number of Diagnoses or Management Options  Acute bronchitis: new and requires workup     Amount and/or Complexity of Data Reviewed  Clinical lab tests: ordered  Tests in the radiology section of CPT®: reviewed and ordered  Review and summarize past medical records: yes  Independent visualization of images, tracings, or specimens: yes    Risk of Complications, Morbidity, and/or Mortality  Presenting problems: low  Diagnostic procedures: low  Management options: low  General comments: Pt in no distress  Will treat for acute bronchitis, however will test for covid/flu as well  VSS  Discussed if results+ can stop abx  F/u with PCP  RTED if sx worsen  Patient Progress  Patient progress: improved      Disposition  Final diagnoses:   Acute bronchitis     Time reflects when diagnosis was documented in both MDM as applicable and the Disposition within this note     Time User Action Codes Description Comment    3/2/2022 10:40 PM Sixto Karimi Add [J20 9] Acute bronchitis       ED Disposition     ED Disposition Condition Date/Time Comment    Discharge Stable Wed Mar 2, 2022 10:39 PM Genesis Cha discharge to home/self care              Follow-up Information     Follow up With Specialties Details Why Contact Info Additional Information    Jolene Barakat PA-C Internal Medicine, Physician Assistant Schedule an appointment as soon as possible for a visit   [place]  [address] [facility]  [address] [address] [facility],[address]        [address] Emergency Department Emergency Medicine  If symptoms worsen [address] [id number]  [address] [facility], [address] [facility], [address]          Patient's Medications   Discharge Prescriptions    AZITHROMYCIN (ZITHROMAX) 250 MG TABLET    Take 1 tablet (250 mg total) by mouth every 24 hours for 4 days       Start Date: 3/3/2022  End Date: 3/7/2022       Order Dose: 250 mg       Quantity: 4 tablet    Refills: 0    METHYLPREDNISOLONE 4 MG TABLET THERAPY PACK    Use as directed on package       Start Date: 3/2/2022  End Date: --       Order Dose: --       Quantity: 21 tablet    Refills: 0       No discharge procedures on file      PDMP Review     None          ED Provider  Electronically Signed by           Adama Carmona MD  03/02/22 4317

## 2022-03-09 DIAGNOSIS — F10.11 HISTORY OF ALCOHOL ABUSE: ICD-10-CM

## 2022-03-09 RX ORDER — VITAMIN B COMPLEX
1 CAPSULE ORAL DAILY
Qty: 30 CAPSULE | Refills: 6 | Status: SHIPPED | OUTPATIENT
Start: 2022-03-09

## 2022-03-11 ENCOUNTER — OFFICE VISIT (OUTPATIENT)
Dept: FAMILY MEDICINE CLINIC | Facility: HOSPITAL | Age: 67
End: 2022-03-11
Payer: MEDICARE

## 2022-03-11 VITALS
RESPIRATION RATE: 16 BRPM | DIASTOLIC BLOOD PRESSURE: 62 MMHG | SYSTOLIC BLOOD PRESSURE: 108 MMHG | HEART RATE: 80 BPM | WEIGHT: 164 LBS | BODY MASS INDEX: 22.21 KG/M2 | OXYGEN SATURATION: 97 % | HEIGHT: 72 IN

## 2022-03-11 DIAGNOSIS — J41.1 MUCOPURULENT CHRONIC BRONCHITIS (HCC): Primary | ICD-10-CM

## 2022-03-11 DIAGNOSIS — F20.9 SCHIZOPHRENIA, UNSPECIFIED TYPE (HCC): ICD-10-CM

## 2022-03-11 DIAGNOSIS — F17.200 TOBACCO DEPENDENCE: ICD-10-CM

## 2022-03-11 DIAGNOSIS — E11.9 TYPE 2 DIABETES MELLITUS WITHOUT COMPLICATION, WITHOUT LONG-TERM CURRENT USE OF INSULIN (HCC): ICD-10-CM

## 2022-03-11 PROBLEM — J42 CHRONIC BRONCHITIS (HCC): Status: ACTIVE | Noted: 2020-03-02

## 2022-03-11 PROCEDURE — 99214 OFFICE O/P EST MOD 30 MIN: CPT | Performed by: INTERNAL MEDICINE

## 2022-03-11 RX ORDER — NICOTINE 21 MG/24HR
1 PATCH, TRANSDERMAL 24 HOURS TRANSDERMAL EVERY 24 HOURS
Qty: 28 PATCH | Refills: 0 | Status: SHIPPED | OUTPATIENT
Start: 2022-03-11 | End: 2022-03-21 | Stop reason: SDUPTHER

## 2022-03-11 RX ORDER — PRAVASTATIN SODIUM 20 MG
20 TABLET ORAL
Qty: 90 TABLET | Refills: 3 | Status: SHIPPED | OUTPATIENT
Start: 2022-03-11

## 2022-03-11 RX ORDER — HALOPERIDOL 5 MG
5 TABLET ORAL 2 TIMES DAILY
COMMUNITY

## 2022-03-11 RX ORDER — TIOTROPIUM BROMIDE AND OLODATEROL 3.124; 2.736 UG/1; UG/1
2 SPRAY, METERED RESPIRATORY (INHALATION) DAILY
Qty: 4 G | Refills: 5 | Status: SHIPPED | OUTPATIENT
Start: 2022-03-11 | End: 2022-07-21 | Stop reason: ALTCHOICE

## 2022-03-11 RX ORDER — POLYETHYLENE GLYCOL 3350 17 G
2 POWDER IN PACKET (EA) ORAL AS NEEDED
Qty: 100 EACH | Refills: 5 | Status: SHIPPED | OUTPATIENT
Start: 2022-03-11 | End: 2022-03-21 | Stop reason: SDUPTHER

## 2022-03-11 NOTE — PROGRESS NOTES
Assessment/Plan:    Mucopurulent chronic bronchitis [facility])  Patient presents today for follow-up after ER visit on March 2nd  He went to the ER because of increasing cough  Chest x-ray showed no pneumonia, no masses  We reviewed the chest x-ray to get with the patient  Patient was found to have COPD exacerbation and was prescribed Medrol Dosepak and azithromycin  Patient presents today for follow-up and tells me that he continues to cough with purulent sputum production mainly in the morning and he continues to have shortness of breath with exertion  This has been going on for years actually! He denies worsening of his cough or shortness of breath  He denies chest pain, pleurisy, lower extremity swelling, weight gain  He is a smoker of at least 1 pack a day for more than 30 years  On physical examination patient has no JVD, he has scant rhonchi on inspiration expiration bilaterally  He has no expiratory wheezing or crackles  Heart is regular rhythm without gallop  He has no lower extremity edema  Patient most likely has COPD with chronic bronchitis and burn production  I will check a CBC patient has no leukocytosis  I ordered lung function test with bronchodilator and lung volumes    Patient drives benefit from using albuterol inhaler as needed  I prescribed Stiolto inhaler    I will reassess the patient in 3 months      Type 2 diabetes mellitus without complication, without long-term current use of insulin ([facility] )    Lab Results   Component Value Date    HGBA1C 5 7 09/29/2021     Patient has type 2 diabetes and he is on metformin  He denies change in vision  Denies lower extremity numbness or tingling  On physical examination monofilament testing of the feet is normal     I ordered the diabetic labs he is due for  He is a smoker with type 2 diabetes at high risk for ASCVD       I am starting him on pravastatin 20 mg daily for ASCVD prevention    Schizophrenia [facility])  Patient has schizophrenia  His moods he claims are at baseline  He is managed by Psychiatry  Continue haloperidol, Latuda, sertraline  He denies any suicidal ideation       Diagnoses and all orders for this visit:    Mucopurulent chronic bronchitis ([address] )  -     Complete PFT with post bronchodilator  -     tiotropium-olodaterol (Stiolto Respimat) 2 5-2 5 MCG/ACT inhaler; Inhale 2 puffs daily    Type 2 diabetes mellitus without complication, without long-term current use of insulin ([facility])  -     pravastatin (PRAVACHOL) 20 mg tablet; Take 1 tablet (20 mg total) by mouth daily at bedtime  -     Hemoglobin A1C; Future  -     Comprehensive metabolic panel; Future  -     CBC and Platelet; Future  -     Microalbumin / creatinine urine ratio; Future  -     Lipid Panel with Direct LDL reflex; Future  -     TSH, 3rd generation with Free T4 reflex; Future    Schizophrenia, unspecified type ([facility])    Tobacco dependence  -     nicotine (NICODERM CQ) 21 mg/24 hr TD 24 hr patch; Place 1 patch on the skin every 24 hours  -     nicotine (NICODERM CQ) 14 mg/24hr TD 24 hr patch; Place 1 patch on the skin every 24 hours  -     nicotine (NICODERM CQ) 7 mg/24hr TD 24 hr patch; Place 1 patch on the skin every 24 hours  -     nicotine polacrilex (COMMIT) 2 MG lozenge; Apply 1 lozenge (2 mg total) to the mouth or throat as needed for smoking cessation    Other orders  -     haloperidol (HALDOL) 5 mg tablet; Take 5 mg by mouth 2 (two) times a day  -     lurasidone (Latuda) 120 mg tablet; Take 20 mg by mouth daily with dinner          Subjective:      Patient ID: Torin Mcdaniel is a 73 y o  male      Patient presents for follow-up after ER visit as detailed in the assessment and plan        The following portions of the patient's history were reviewed and updated as appropriate: current medications, past family history, past medical history, past social history, past surgical history and problem list     Review of Systems      Objective:    /62 Pulse 80   Resp 16   Ht 6' (1 829 m)   Wt 74 4 kg (164 lb)   SpO2 97%   BMI 22 24 kg/m²      Physical Exam  Constitutional:       General: He is not in acute distress  Appearance: He is not toxic-appearing  HENT:      Head: Normocephalic  Cardiovascular:      Rate and Rhythm: Normal rate and regular rhythm  Pulses: no weak pulses          Posterior tibial pulses are 2+ on the right side and 2+ on the left side  Heart sounds: No murmur heard  Pulmonary:      Effort: No respiratory distress  Breath sounds: Rhonchi present  No wheezing or rales  Abdominal:      General: Bowel sounds are normal       Palpations: Abdomen is soft  Tenderness: There is no abdominal tenderness  Musculoskeletal:      Cervical back: Neck supple  Feet:      Right foot:      Skin integrity: Callus present  No ulcer  Left foot:      Skin integrity: Callus present  No ulcer  Skin:     General: Skin is warm and dry  Comments: Patient has no lower extremity edema   Neurological:      Mental Status: He is alert  Motor: No weakness  Gait: Gait normal    Psychiatric:         Mood and Affect: Mood normal            Diabetic Foot Exam    Patient's shoes and socks removed  Right Foot/Ankle   Right Foot Inspection  Skin Exam: callus and callus  No ulcer  Sensory   Monofilament testing: intact    Vascular  The right PT pulse is 2+  Left Foot/Ankle  Left Foot Inspection  Skin Exam: callus  No ulcer  Sensory   Monofilament testing: intact    Vascular  The left PT pulse is 2+  Assign Risk Category  No deformity present  No loss of protective sensation  No weak pulses  Risk: 0      Depression Screening and Follow-up Plan: Patient assessed for underlying major depression  Brief counseling provided and recommend additional follow-up/re-evaluation next office visit  Tobacco Cessation Counseling: Tobacco cessation counseling was provided   The patient is sincerely urged to quit consumption of tobacco  He is ready to quit tobacco  Medication options and side effects of medication discussed  Nicotine patch was prescribed         Mary Schmitt MD

## 2022-03-11 NOTE — ASSESSMENT & PLAN NOTE
Patient has schizophrenia  His moods he claims are at baseline  He is managed by Psychiatry  Continue haloperidol, Latuda, sertraline    He denies any suicidal ideation

## 2022-03-11 NOTE — ASSESSMENT & PLAN NOTE
Patient presents today for follow-up after ER visit on March 2nd  He went to the ER because of increasing cough  Chest x-ray showed no pneumonia, no masses  We reviewed the chest x-ray to get with the patient  Patient was found to have COPD exacerbation and was prescribed Medrol Dosepak and azithromycin  Patient presents today for follow-up and tells me that he continues to cough with purulent sputum production mainly in the morning and he continues to have shortness of breath with exertion  This has been going on for years actually! He denies worsening of his cough or shortness of breath  He denies chest pain, pleurisy, lower extremity swelling, weight gain  He is a smoker of at least 1 pack a day for more than 30 years  On physical examination patient has no JVD, he has scant rhonchi on inspiration expiration bilaterally  He has no expiratory wheezing or crackles  Heart is regular rhythm without gallop  He has no lower extremity edema  Patient most likely has COPD with chronic bronchitis and burn production  I will check a CBC patient has no leukocytosis  I ordered lung function test with bronchodilator and lung volumes    Patient drives benefit from using albuterol inhaler as needed    I prescribed Stiolto inhaler    I will reassess the patient in 3 months

## 2022-03-11 NOTE — ASSESSMENT & PLAN NOTE
Lab Results   Component Value Date    HGBA1C 5 7 09/29/2021     Patient has type 2 diabetes and he is on metformin  He denies change in vision  Denies lower extremity numbness or tingling  On physical examination monofilament testing of the feet is normal     I ordered the diabetic labs he is due for  He is a smoker with type 2 diabetes at high risk for ASCVD       I am starting him on pravastatin 20 mg daily for ASCVD prevention

## 2022-04-19 LAB — CREAT ?TM UR-SCNC: 49.5 UMOL/L

## 2022-05-11 ENCOUNTER — HOSPITAL ENCOUNTER (OUTPATIENT)
Dept: PULMONOLOGY | Facility: HOSPITAL | Age: 67
Discharge: HOME | End: 2022-05-11
Attending: INTERNAL MEDICINE
Payer: MEDICARE

## 2022-05-11 DIAGNOSIS — J41.1 MUCOPURULENT CHRONIC BRONCHITIS (HCC): ICD-10-CM

## 2022-05-11 PROCEDURE — 94729 DIFFUSING CAPACITY: CPT | Performed by: INTERNAL MEDICINE

## 2022-05-11 PROCEDURE — 94060 EVALUATION OF WHEEZING: CPT | Performed by: INTERNAL MEDICINE

## 2022-05-11 PROCEDURE — 94729 DIFFUSING CAPACITY: CPT

## 2022-05-11 PROCEDURE — 94726 PLETHYSMOGRAPHY LUNG VOLUMES: CPT | Performed by: INTERNAL MEDICINE

## 2022-05-11 PROCEDURE — 94760 N-INVAS EAR/PLS OXIMETRY 1: CPT

## 2022-05-11 PROCEDURE — 94060 EVALUATION OF WHEEZING: CPT

## 2022-05-11 PROCEDURE — 94726 PLETHYSMOGRAPHY LUNG VOLUMES: CPT

## 2022-05-11 RX ORDER — ALBUTEROL SULFATE 2.5 MG/3ML
2.5 SOLUTION RESPIRATORY (INHALATION) ONCE
Status: COMPLETED | OUTPATIENT
Start: 2022-05-11 | End: 2022-05-11

## 2022-05-11 RX ADMIN — ALBUTEROL SULFATE 2.5 MG: 2.5 SOLUTION RESPIRATORY (INHALATION) at 10:50

## 2022-05-12 ENCOUNTER — OFFICE VISIT (OUTPATIENT)
Dept: FAMILY MEDICINE CLINIC | Facility: HOSPITAL | Age: 67
End: 2022-05-12
Payer: MEDICARE

## 2022-05-12 VITALS
OXYGEN SATURATION: 96 % | WEIGHT: 166 LBS | HEART RATE: 79 BPM | SYSTOLIC BLOOD PRESSURE: 132 MMHG | DIASTOLIC BLOOD PRESSURE: 84 MMHG | BODY MASS INDEX: 20.22 KG/M2 | HEIGHT: 76 IN

## 2022-05-12 DIAGNOSIS — Z11.59 ENCOUNTER FOR SCREENING FOR OTHER VIRAL DISEASES: ICD-10-CM

## 2022-05-12 DIAGNOSIS — Z87.891 PERSONAL HISTORY OF NICOTINE DEPENDENCE: ICD-10-CM

## 2022-05-12 DIAGNOSIS — Z12.12 SCREENING FOR COLORECTAL CANCER: Primary | ICD-10-CM

## 2022-05-12 DIAGNOSIS — Z23 NEED FOR PNEUMOCOCCAL VACCINATION: ICD-10-CM

## 2022-05-12 DIAGNOSIS — E78.49 OTHER HYPERLIPIDEMIA: ICD-10-CM
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
## 2022-05-12 DIAGNOSIS — Z23 PNEUMOCOCCAL VACCINATION ADMINISTERED AT CURRENT VISIT: ICD-10-CM

## 2022-05-12 DIAGNOSIS — F20.9 SCHIZOPHRENIA, UNSPECIFIED TYPE (HCC): ICD-10-CM

## 2022-05-12 DIAGNOSIS — Z12.11 SCREENING FOR COLORECTAL CANCER: Primary | ICD-10-CM

## 2022-05-12 DIAGNOSIS — E11.9 TYPE 2 DIABETES MELLITUS WITHOUT COMPLICATION, WITHOUT LONG-TERM CURRENT USE OF INSULIN (HCC): ICD-10-CM

## 2022-05-12 DIAGNOSIS — F17.200 SMOKING: ICD-10-CM

## 2022-05-12 DIAGNOSIS — F19.11 HISTORY OF DRUG ABUSE IN REMISSION (HCC): ICD-10-CM

## 2022-05-12 PROCEDURE — G0009 ADMIN PNEUMOCOCCAL VACCINE: HCPCS

## 2022-05-12 PROCEDURE — 90732 PPSV23 VACC 2 YRS+ SUBQ/IM: CPT

## 2022-05-12 PROCEDURE — G0439 PPPS, SUBSEQ VISIT: HCPCS | Performed by: STUDENT IN AN ORGANIZED HEALTH CARE EDUCATION/TRAINING PROGRAM

## 2022-05-12 NOTE — PROGRESS NOTES
Assessment and Plan:      Diagnosis ICD-10-CM Associated Orders   1  Screening for colorectal cancer  Z12 11     Z12 12    2  Smoking  F17 200 CT lung screening program     nicotine polacrilex (NICORETTE) 2 mg gum   3  Schizophrenia, unspecified type ([facility] )  F20 9    4  Other hyperlipidemia  E78 49    5  Type 2 diabetes mellitus without complication, without long-term current use of insulin (HCC)  E11 9    6  Pneumococcal vaccination administered at current visit  Z23 PNEUMOCOCCAL POLYSACCHARIDE VACCINE 23-VALENT =>3YO SQ IM   7  [facility] of drug abuse in remission ([facility] )  F19 11    8  Personal history of nicotine dependence   Z87 891 CT lung screening program     Preventive health issues were discussed with patient, and age appropriate screening tests were ordered as noted in patient's After Visit Summary  Personalized health advice and appropriate referrals for health education or preventive services given if needed, as noted in patient's After Visit Summary  History of Present Illness:     Patient presents for Welcome to Medicare visit       Patient Care Team:  Fenton Homans, MD as PCP - General (Internal Medicine)     Review of Systems:     Review of Systems   Unable to perform ROS: Psychiatric disorder      Problem List:     Patient Active Problem List   Diagnosis    Type 2 diabetes mellitus without complication, without long-term current use of insulin ([facility] )    Mucopurulent chronic bronchitis ([facility] )    Benign prostatic hyperplasia with nocturia    Schizophrenia ([facility] )    Smoking    Hyperlipidemia    History of drug abuse in remission ([facility] )    Primary osteoarthritis of right knee    Depressive disorder    Mild protein-calorie malnutrition (HCC)    Olecranon bursitis of left elbow      Past Medical and Surgical History:     Past Medical History:   Diagnosis Date    Atopic rhinitis     BPH with urinary obstruction     COPD (chronic obstructive pulmonary disease) ([facility] )     Hyperlipidemia     Incomplete bladder emptying     Sch[facility]ia [facility] )      History reviewed  No pertinent surgical history     Family History:     Family History   Problem Relation Age of Onset    Substance Abuse Sister         cigarettes    Substance Abuse Brother     Alcohol abuse Brother       Social History:     Social History     Socioeconomic History    Marital status: Single     Spouse name: None    Number of children: None    Years of education: None    Highest education level: None   Occupational History    None   Tobacco Use    Smoking status: Current Every Day Smoker     Packs/day: 1 00     Years: 45 00     Pack years: 45 00    Smokeless tobacco: Never Used   Vaping Use    Vaping Use: Never used   Substance and Sexual Activity    Alcohol use: No     Comment: hx  of hard liquor, regular use, quit at age 33    Chantecollin Meyer Drug use: No    Sexual activity: None   Other Topics Concern    None   Social History Narrative    Lives at [facility]    Feels safe where he lives    No living will    Sees  Dentist occas      Social Determinants of Health     Financial Resource Strain: Not on file   Food Insecurity: Not on file   Transportation Needs: Not on file   Physical Activity: Not on file   Stress: Not on file   Social Connections: Not on file   Intimate Partner Violence: Not on file   Housing Stability: Not on file      Medications and Allergies:     Current Outpatient Medications   Medication Sig Dispense Refill    acetaminophen (TYLENOL) 500 mg tablet Take 1 tablet (500 mg total) by mouth every 8 (eight) hours as needed for mild pain or headaches 90 tablet 1    albuterol (PROVENTIL HFA,VENTOLIN HFA) 90 mcg/act inhaler Inhale 2 puffs every 6 (six) hours as needed for wheezing or shortness of breath 1 Inhaler 5    ASPIRIN 81 PO Take by mouth 1  daily       b complex vitamins capsule Take 1 capsule by mouth daily 30 capsule 6    buPROPion (WELLBUTRIN SR) 150 mg 12 hr tablet 1 daily      buPROPion (WELLBUTRIN XL) 300 mg 24 hr tablet Take 300 mg by mouth daily      Cholecalciferol (VITAMIN D3) 5000 units CAPS Take by mouth 1 daily      folic acid (FOLVITE) 1 mg tablet Take 1 tablet (1 mg total) by mouth daily 30 tablet 6    haloperidol (HALDOL) 5 mg tablet Take 5 mg by mouth 2 (two) times a day      ibuprofen (MOTRIN) 200 mg tablet Take by mouth every 6 (six) hours as needed for mild pain      lurasidone (LATUDA) 120 mg tablet Take 20 mg by mouth daily with dinner      metFORMIN (GLUCOPHAGE) 500 mg tablet Take 500 mg by mouth 2 (two) times a day with meals      Multiple Vitamins-Minerals (MULTIVITAMIN WITH MINERALS) tablet Take 1 tablet by mouth daily      Naloxone HCl (NARCAN NA) into each nostril Administer nasally when suspected overdose      nicotine polacrilex (NICORETTE) 2 mg gum Chew 1 each (2 mg total)  as needed in the morning and 1 each (2 mg total) as needed in the evening for smoking cessation  100 each 0    omega-3-acid ethyl esters (LOVAZA) 1 g capsule Take 2 g by mouth daily      pravastatin (PRAVACHOL) 20 mg tablet Take 1 tablet (20 mg total) by mouth daily at bedtime 90 tablet 3    sertraline (ZOLOFT) 100 mg tablet 1 daily      tamsulosin (FLOMAX) 0 4 mg Take 1 capsule (0 4 mg total) by mouth daily with dinner 90 capsule 3    tiotropium-olodaterol (Stiolto Respimat) 2 5-2 5 MCG/ACT inhaler Inhale 2 puffs daily 4 g 5     No current facility-administered medications for this visit       No Known Allergies   Immunizations:     Immunization History   Administered Date(s) Administered    COVID-19 PFIZER VACCINE 0 3 ML IM 01/27/2021, 02/17/2021, 10/21/2021    H1N1, All Formulations 11/23/2009    INFLUENZA 11/01/2010, 08/25/2011, 08/10/2012, 10/10/2013, 08/09/2014, 10/15/2014, 09/14/2015, 09/21/2016, 08/03/2017, 09/26/2018, 10/29/2020    Influenza, high dose seasonal 0 7 mL 09/29/2021    Influenza, injectable, quadrivalent, preservative free 0 5 mL 10/21/2019    Pneumococcal Conjugate 13-Valent 12/23/2016  Tdap 08/09/2014, 11/24/2014, 08/06/2021    Zoster 03/09/2017      Health Maintenance:         Topic Date Due    Lung Cancer Screening  10/11/2022    Colorectal Cancer Screening  11/17/2022    HIV Screening  Completed    Hepatitis C Screening  Completed         Topic Date Due    Pneumococcal Vaccine: 65+ Years (2 - PPSV23 or PCV20) 12/23/2017    DTaP,Tdap,and Td Vaccines (1 - Tdap) 08/06/2021    COVID-19 Vaccine (4 - Booster for Acosta Peter series) 02/21/2022      Medicare Screening Tests and Risk Assessments:     Braulio Alcala is here for his Subsequent Wellness visit  Health Risk Assessment:   Patient rates overall health as good  Patient feels that their physical health rating is slightly better  Patient is dissatisfied with their life  Eyesight was rated as same  Hearing was rated as same  Patient feels that their emotional and mental health rating is same  Patients states they are sometimes angry  Patient states they are always unusually tired/fatigued  Pain experienced in the last 7 days has been some  Patient's pain rating has been 6/10  Patient states that he has experienced no weight loss or gain in last 6 months  Depression Screening:   PHQ-9 Score: 15      Fall Risk Screening: In the past year, patient has experienced: history of falling in past year    Injured during fall?: Yes    Feels unsteady when standing or walking?: Yes    Worried about falling?: Yes      Home Safety:  Patient does not have trouble with stairs inside or outside of their home  Patient has working smoke alarms and has working carbon monoxide detector  Home safety hazards include: none  Pt states other people could cause him to fall    Nutrition:   Current diet is Regular  Medications:   Patient is currently taking over-the-counter supplements  OTC medications include: see medication list  Patient is not able to manage medications       Activities of Daily Living (ADLs)/Instrumental Activities of Daily Living (IADLs):   Walk and transfer into and out of bed and chair?: Yes  Dress and groom yourself?: Yes    Bathe or shower yourself?: Yes    Feed yourself? Yes  Do your laundry/housekeeping?: Yes  Manage your money, pay your bills and track your expenses?: No  Make your own meals?: Yes    Do your own shopping?: Yes    Previous Hospitalizations:   Any hospitalizations or ED visits within the last 12 months?: Yes    How many hospitalizations have you had in the last year?: 1-2    Hospitalization Comments: Knee injury/fall     PREVENTIVE SCREENINGS      Cardiovascular Screening:    General: Screening Not Indicated and History Lipid Disorder      Diabetes Screening:     General: Screening Not Indicated and History Diabetes      Colorectal Cancer Screening:     General: Screening Current      Prostate Cancer Screening:      Due for: PSA      Abdominal Aortic Aneurysm (AAA) Screening:    Risk factors include: age between 73-69 yo and tobacco use        Lung Cancer Screening:     General: Screening Current    Due for: Low Dose CT (LDCT)      Hepatitis C Screening:    General: Screening Current    Screening, Brief Intervention, and Referral to Treatment (SBIRT)    Screening      AUDIT-C Screenin) How often did you have a drink containing alcohol in the past year? never  2) How many drinks did you have on a typical day when you were drinking in the past year? 0  3) How often did you have 6 or more drinks on one occasion in the past year? never    AUDIT-C Score: 0  Interpretation: Score 0-3 (male): Negative screen for alcohol misuse    Single Item Drug Screening:  How often have you used an illegal drug (including marijuana) or a prescription medication for non-medical reasons in the past year? never    Single Item Drug Screen Score: 0  Interpretation: Negative screen for possible drug use disorder    Other Counseling Topics:   Car/seat belt/driving safety        Physical Exam:     /84   Pulse 79   Ht 6' 4" (1 93 m)   Wt 75 3 kg (166 lb)   SpO2 96%   BMI 20 21 kg/m²     Physical Exam  Vitals reviewed  Constitutional:       General: He is not in acute distress  Appearance: Normal appearance  He is normal weight  He is not ill-appearing  HENT:      Head: Normocephalic and atraumatic  Cardiovascular:      Rate and Rhythm: Normal rate and regular rhythm  Pulses: Normal pulses  Heart sounds: Normal heart sounds  No murmur heard  No friction rub  Pulmonary:      Effort: Pulmonary effort is normal  No respiratory distress  Breath sounds: Normal breath sounds  Musculoskeletal:      Cervical back: Normal range of motion  No rigidity  Neurological:      Mental Status: He is alert and oriented to person, place, and time        Gait: Gait normal    Psychiatric:      Comments: Tangential speech, but address uk[place] war, and pleasant          Lannis Massing, DO

## 2022-05-12 NOTE — PATIENT INSTRUCTIONS
Medicare Preventive Visit Patient Instructions  Thank you for completing your Welcome to Medicare Visit or Medicare Annual Wellness Visit today  Your next wellness visit will be due in one year (5/13/2023)  The screening/preventive services that you may require over the next 5-10 years are detailed below  Some tests may not apply to you based off risk factors and/or age  Screening tests ordered at today's visit but not completed yet may show as past due  Also, please note that scanned in results may not display below  Preventive Screenings:  Service Recommendations Previous Testing/Comments   Colorectal Cancer Screening  · Colonoscopy    · Fecal Occult Blood Test (FOBT)/Fecal Immunochemical Test (FIT)  · Fecal DNA/Cologuard Test  · Flexible Sigmoidoscopy Age: 54-65 years old   Colonoscopy: every 10 years (May be performed more frequently if at higher risk)  OR  FOBT/FIT: every 1 year  OR  Cologuard: every 3 years  OR  Sigmoidoscopy: every 5 years  Screening may be recommended earlier than age 48 if at higher risk for colorectal cancer  Also, an individualized decision between you and your healthcare provider will decide whether screening between the ages of 74-80 would be appropriate   Colonoscopy: 11/17/2021  FOBT/FIT: Not on file  Cologuard: Not on file  Sigmoidoscopy: Not on file    Screening Current     Prostate Cancer Screening Individualized decision between patient and health care provider in men between ages of 53-78   Medicare will cover every 12 months beginning on the day after your 50th birthday PSA: No results in last 5 years           Hepatitis C Screening Once for adults born between 1945 and 1965  More frequently in patients at high risk for Hepatitis C Hep C Antibody: 03/10/2020    Screening Current   Diabetes Screening 1-2 times per year if you're at risk for diabetes or have pre-diabetes Fasting glucose: No results in last 5 years   A1C: 6 1 %    Screening Not Indicated  History Diabetes Cholesterol Screening Once every 5 years if you don't have a lipid disorder  May order more often based on risk factors  Lipid panel: Not on file    Screening Not Indicated  History Lipid Disorder      Other Preventive Screenings Covered by Medicare:  1  Abdominal Aortic Aneurysm (AAA) Screening: covered once if your at risk  You're considered to be at risk if you have a family history of AAA or a male between the age of 73-68 who smoking at least 100 cigarettes in your lifetime  2  Lung Cancer Screening: covers low dose CT scan once per year if you meet all of the following conditions: (1) Age 50-69; (2) No signs or symptoms of lung cancer; (3) Current smoker or have quit smoking within the last 15 years; (4) You have a tobacco smoking history of at least 30 pack years (packs per day x number of years you smoked); (5) You get a written order from a healthcare provider  3  Glaucoma Screening: covered annually if you're considered high risk: (1) You have diabetes OR (2) Family history of glaucoma OR (3)  aged 48 and older OR (3)  American aged 72 and older  3  Osteoporosis Screening: covered every 2 years if you meet one of the following conditions: (1) Have a vertebral abnormality; (2) On glucocorticoid therapy for more than 3 months; (3) Have primary hyperparathyroidism; (4) On osteoporosis medications and need to assess response to drug therapy  5  HIV Screening: covered annually if you're between the age of 12-76  Also covered annually if you are younger than 13 and older than 72 with risk factors for HIV infection  For pregnant patients, it is covered up to 3 times per pregnancy      Immunizations:  Immunization Recommendations   Influenza Vaccine Annual influenza vaccination during flu season is recommended for all persons aged >= 6 months who do not have contraindications   Pneumococcal Vaccine (Prevnar and Pneumovax)  * Prevnar = PCV13  * Pneumovax = PPSV23 Adults 25-60 years old: 1-3 doses may be recommended based on certain risk factors  Adults 72 years old: Prevnar (PCV13) vaccine recommended followed by Pneumovax (PPSV23) vaccine  If already received PPSV23 since turning 65, then PCV13 recommended at least one year after PPSV23 dose  Hepatitis B Vaccine 3 dose series if at intermediate or high risk (ex: diabetes, end stage renal disease, liver disease)   Tetanus (Td) Vaccine - COST NOT COVERED BY MEDICARE PART B Following completion of primary series, a booster dose should be given every 10 years to maintain immunity against tetanus  Td may also be given as tetanus wound prophylaxis  Tdap Vaccine - COST NOT COVERED BY MEDICARE PART B Recommended at least once for all adults  For pregnant patients, recommended with each pregnancy  Shingles Vaccine (Shingrix) - COST NOT COVERED BY MEDICARE PART B  2 shot series recommended in those aged 48 and above     Health Maintenance Due:      Topic Date Due    Lung Cancer Screening  10/11/2022    Colorectal Cancer Screening  11/17/2022    HIV Screening  Completed    Hepatitis C Screening  Completed     Immunizations Due:      Topic Date Due    Pneumococcal Vaccine: 65+ Years (2 - PPSV23 or PCV20) 12/23/2017    DTaP,Tdap,and Td Vaccines (1 - Tdap) 08/06/2021    COVID-19 Vaccine (4 - Booster for Acosta Peter series) 02/21/2022     Advance Directives   What are advance directives? Advance directives are legal documents that state your wishes and plans for medical care  These plans are made ahead of time in case you lose your ability to make decisions for yourself  Advance directives can apply to any medical decision, such as the treatments you want, and if you want to donate organs  What are the types of advance directives? There are many types of advance directives, and each state has rules about how to use them  You may choose a combination of any of the following:  · Living will: This is a written record of the treatment you want   You can also choose which treatments you do not want, which to limit, and which to stop at a certain time  This includes surgery, medicine, IV fluid, and tube feedings  · Durable power of  for healthcare [facility]): This is a written record that states who you want to make healthcare choices for you when you are unable to make them for yourself  This person, called a proxy, is usually a family member or a friend  You may choose more than 1 proxy  · Do not resuscitate (DNR) order:  A DNR order is used in case your heart stops beating or you stop breathing  It is a request not to have certain forms of treatment, such as CPR  A DNR order may be included in other types of advance directives  · Medical directive: This covers the care that you want if you are in a coma, near death, or unable to make decisions for yourself  You can list the treatments you want for each condition  Treatment may include pain medicine, surgery, blood transfusions, dialysis, IV or tube feedings, and a ventilator (breathing machine)  · Values history: This document has questions about your views, beliefs, and how you feel and think about life  This information can help others choose the care that you would choose  Why are advance directives important? An advance directive helps you control your care  Although spoken wishes may be used, it is better to have your wishes written down  Spoken wishes can be misunderstood, or not followed  Treatments may be given even if you do not want them  An advance directive may make it easier for your family to make difficult choices about your care  Fall Prevention    Fall prevention  includes ways to make your home and other areas safer  It also includes ways you can move more carefully to prevent a fall  Health conditions that cause changes in your blood pressure, vision, or muscle strength and coordination may increase your risk for falls   Medicines may also increase your risk for falls if they make you dizzy, weak, or sleepy  Fall prevention tips:   · Stand or sit up slowly  · Use assistive devices as directed  · Wear shoes that fit well and have soles that   · Wear a personal alarm  · Stay active  · Manage your medical conditions  Home Safety Tips:  · Add items to prevent falls in the bathroom  · Keep paths clear  · Install bright lights in your home  · Keep items you use often on shelves within reach  · Paint or place reflective tape on the edges of your stairs  Cigarette Smoking and Your Health   Risks to your health if you smoke:  Nicotine and other chemicals found in tobacco damage every cell in your body  Even if you are a light smoker, you have an increased risk for cancer, heart disease, and lung disease  If you are pregnant or have diabetes, smoking increases your risk for complications  Benefits to your health if you stop smoking:   · You decrease respiratory symptoms such as coughing, wheezing, and shortness of breath  · You reduce your risk for cancers of the lung, mouth, throat, kidney, bladder, pancreas, stomach, and cervix  If you already have cancer, you increase the benefits of chemotherapy  You also reduce your risk for cancer returning or a second cancer from developing  · You reduce your risk for heart disease, blood clots, heart attack, and stroke  · You reduce your risk for lung infections, and diseases such as pneumonia, asthma, chronic bronchitis, and emphysema  · Your circulation improves  More oxygen can be delivered to your body  If you have diabetes, you lower your risk for complications, such as kidney, artery, and eye diseases  You also lower your risk for nerve damage  Nerve damage can lead to amputations, poor vision, and blindness  · You improve your body's ability to heal and to fight infections  For more information and support to stop smoking:   · [employer]  gov  Phone: 9- 993 - 366-4172  Web Address: www [employer]  gov © Copyright [employer] 2018 Information is for End User's use only and may not be sold, redistributed or otherwise used for commercial purposes   All illustrations and images included in CareNotes® are the copyrighted property of A D A M , Inc  or Scott Owusu

## 2022-05-13 ENCOUNTER — TELEPHONE (OUTPATIENT)
Dept: ADMINISTRATIVE | Facility: OTHER | Age: 67
End: 2022-05-13

## 2022-05-13 ENCOUNTER — APPOINTMENT (OUTPATIENT)
Dept: LAB | Facility: HOSPITAL | Age: 67
End: 2022-05-13
Attending: STUDENT IN AN ORGANIZED HEALTH CARE EDUCATION/TRAINING PROGRAM
Payer: MEDICARE
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
## 2022-05-13 DIAGNOSIS — Z11.59 ENCOUNTER FOR SCREENING FOR OTHER VIRAL DISEASES: ICD-10-CM

## 2022-05-13 LAB
HBV SURFACE AG SER QL: NORMAL
HCV AB SER QL: NORMAL
HIV 1+2 AB+HIV1 P24 AG SERPL QL IA: NORMAL
HIV1 P24 AG SER QL: NORMAL

## 2022-05-13 PROCEDURE — 36415 COLL VENOUS BLD VENIPUNCTURE: CPT

## 2022-05-13 PROCEDURE — 87806 HIV AG W/HIV1&2 ANTB W/OPTIC: CPT

## 2022-05-13 PROCEDURE — 87340 HEPATITIS B SURFACE AG IA: CPT

## 2022-05-13 PROCEDURE — 86803 HEPATITIS C AB TEST: CPT

## 2022-05-13 NOTE — TELEPHONE ENCOUNTER
----- Message from Jesusita Moralez MA sent at 5/13/2022  9:55 AM EDT -----  05/13/22 9:56 AM    Hello, our patient No patient name on file  has had urine albumin completed/performed   Please assist in updating the patient chart by The date of service is     Thank you,  Jesusita Moralez MA  [facility] [place] [address]

## 2022-05-13 NOTE — TELEPHONE ENCOUNTER
Upon review of the In Basket request we were able to locate, review, and update the patient chart as requested for Urine Microalbumin  Any additional questions or concerns should be emailed to the Practice Liaisons via [email or web address] email, please do not reply via In Basket      Thank you  Chantel Olivarez

## 2022-05-20 ENCOUNTER — TELEPHONE (OUTPATIENT)
Dept: GASTROENTEROLOGY | Facility: CLINIC | Age: 67
End: 2022-05-20

## 2022-05-20 NOTE — TELEPHONE ENCOUNTER
05/20/22  Screened by: Hossein Benjamin    Referring Provider Pedro Luis    Pre- Screening: There is no height or weight on file to calculate BMI  Has patient been referred for a routine screening Colonoscopy? yes  Is the patient between 39-70 years old? yes      Previous Colonoscopy yes   If yes:    Date: ?? Facility: ?? Reason: ??      SCHEDULING STAFF: If the patient is between 45yrs-49yrs, please advise patient to confirm benefits/coverage with their insurance company for a routine screening colonoscopy, some insurance carriers will only cover at [address] or older  If the patient is over 66years old, please schedule an office visit  Does the patient want to see a Gastroenterologist prior to their procedure OR are they having any GI symptoms? no    Has the patient been hospitalized or had abdominal surgery in the past 6 months? no    Does the patient use supplemental oxygen? no    Does the patient take Coumadin, Lovenox, Plavix, Elliquis, Xarelto, or other blood thinning medication? no    Has the patient had a stroke, cardiac event, or stent placed in the past year? no    SCHEDULING STAFF: If patient answers NO to above questions, then schedule procedure  If patient answers YES to above questions, then schedule office appointment  If patient is between 45yrs - 49yrs, please advise patient that we will have to confirm benefits & coverage with their insurance company for a routine screening colonoscopy

## 2022-06-13 ENCOUNTER — TELEPHONE (OUTPATIENT)
Dept: GASTROENTEROLOGY | Facility: CLINIC | Age: 67
End: 2022-06-13

## 2022-06-13 DIAGNOSIS — Z12.11 SCREENING FOR COLON CANCER: Primary | ICD-10-CM

## 2022-06-13 NOTE — TELEPHONE ENCOUNTER
Scheduled date of colonoscopy (as of today): 6/22/22  Physician performing colonoscopy: Dr Genie Almodovar  Location of colonoscopy: [facility]  Bowel prep reviewed with patient: extended colyte  Instructions reviewed with patient by: Aster Garcia  Clearances: no

## 2022-06-20 DIAGNOSIS — Z12.11 SCREENING FOR COLON CANCER: Primary | ICD-10-CM

## 2022-06-20 RX ORDER — POLYETHYLENE GLYCOL 3350 17 G/17G
17 POWDER, FOR SOLUTION ORAL DAILY
Qty: 34 G | Refills: 0 | Status: SHIPPED | OUTPATIENT
Start: 2022-06-20

## 2022-06-20 RX ORDER — POLYETHYLENE GLYCOL 3350 17 G/17G
17 POWDER, FOR SOLUTION ORAL DAILY
Qty: 2 EACH | Refills: 0 | Status: SHIPPED | OUTPATIENT
Start: 2022-06-20 | End: 2022-06-20

## 2022-06-22 ENCOUNTER — HOSPITAL ENCOUNTER (OUTPATIENT)
Dept: GASTROENTEROLOGY | Facility: HOSPITAL | Age: 67
Setting detail: OUTPATIENT SURGERY
Discharge: HOME | End: 2022-06-22
Attending: INTERNAL MEDICINE

## 2022-06-22 VITALS — HEART RATE: 71 BPM | BODY MASS INDEX: 20.09 KG/M2 | OXYGEN SATURATION: 99 % | WEIGHT: 165 LBS | HEIGHT: 76 IN

## 2022-06-22 DIAGNOSIS — Z12.11 SCREENING FOR COLON CANCER: ICD-10-CM

## 2022-06-22 DIAGNOSIS — Z53.9 PROCEDURE NOT CARRIED OUT: ICD-10-CM

## 2022-06-22 NOTE — QUICK NOTE
Patient not NPO appropriate  Will cancel elective C-scope   Coffee @815AM and water on the way to the hospital      Callie Rodriguez MD  Anesthesiology

## 2022-07-06 ENCOUNTER — TELEPHONE (OUTPATIENT)
Dept: GASTROENTEROLOGY | Facility: CLINIC | Age: 67
End: 2022-07-06

## 2022-07-06 NOTE — TELEPHONE ENCOUNTER
1st call-lvm on Danica Borrero's vm at [facility] regarding rescheduling pt's cancelled colon at [address]   Anesthesia cancelled the colon due to pt drinking coffee and smoking that morning

## 2022-07-07 ENCOUNTER — TELEPHONE (OUTPATIENT)
Dept: GASTROENTEROLOGY | Facility: CLINIC | Age: 67
End: 2022-07-07

## 2022-07-07 NOTE — TELEPHONE ENCOUNTER
Patients GI provider:  Dr Thomas Cavazos    Number to return call: [phone] ext [phone]    Reason for call: Rufino Kumar, staff from [address] [place] [address] called to reschedule pt's procedure  Above is his number       Scheduled procedure/appointment date if applicable: Apt/procedure NA

## 2022-07-11 ENCOUNTER — TELEPHONE (OUTPATIENT)
Dept: GASTROENTEROLOGY | Facility: CLINIC | Age: 67
End: 2022-07-11

## 2022-07-11 NOTE — TELEPHONE ENCOUNTER
Patients GI provider:  Dr Linda Jordan    Number to return call: [phone] ext [phone]    Reason for call: Anayeli Midget from [facility]os Waters calling to r/s patients canceled procedure       Scheduled procedure/appointment date if applicable NA

## 2022-07-21 ENCOUNTER — TELEPHONE (OUTPATIENT)
Dept: FAMILY MEDICINE CLINIC | Facility: HOSPITAL | Age: 67
End: 2022-07-21

## 2022-07-21 DIAGNOSIS — J40 BRONCHITIS: ICD-10-CM

## 2022-07-21 DIAGNOSIS — J44.1 COPD WITH EXACERBATION (HCC): ICD-10-CM

## 2022-07-21 RX ORDER — ALBUTEROL SULFATE 90 UG/1
2 AEROSOL, METERED RESPIRATORY (INHALATION) EVERY 6 HOURS PRN
Qty: 8 G | Refills: 5 | Status: SHIPPED | OUTPATIENT
Start: 2022-07-21 | End: 2022-07-21 | Stop reason: SDUPTHER

## 2022-07-21 RX ORDER — ALBUTEROL SULFATE 90 UG/1
2 AEROSOL, METERED RESPIRATORY (INHALATION) EVERY 6 HOURS PRN
Qty: 8 G | Refills: 5 | Status: SHIPPED | OUTPATIENT
Start: 2022-07-21 | End: 2022-08-05 | Stop reason: SDUPTHER

## 2022-07-21 NOTE — TELEPHONE ENCOUNTER
He can try albuterol prn for shortness of breath only, I removed stiolto in his chart  He should call if his breathing worsens after this change!
MARTHA on barbara's line --ext [phone]--nursing at [phone]  They need new rx faxed over for the ventolin HFA    Done dk
Patient supposed to be using stiolto respimate daily  Patient wants this d/c'd  He wants to take only the ventolin as prn  Can new order be faxed to [phone] 
Pls address 
none

## 2022-08-03 ENCOUNTER — DOCUMENTATION (OUTPATIENT)
Dept: FAMILY MEDICINE CLINIC | Facility: HOSPITAL | Age: 67
End: 2022-08-03

## 2022-08-03 ENCOUNTER — TELEPHONE (OUTPATIENT)
Dept: FAMILY MEDICINE CLINIC | Facility: HOSPITAL | Age: 67
End: 2022-08-03

## 2022-08-03 NOTE — TELEPHONE ENCOUNTER
[facility] asking for patient's ventolin to be changed from prn order to straight order    pls fax to [phone]

## 2022-08-04 ENCOUNTER — TELEPHONE (OUTPATIENT)
Dept: FAMILY MEDICINE CLINIC | Facility: HOSPITAL | Age: 67
End: 2022-08-04

## 2022-08-05 ENCOUNTER — OFFICE VISIT (OUTPATIENT)
Dept: FAMILY MEDICINE CLINIC | Facility: HOSPITAL | Age: 67
End: 2022-08-05
Payer: MEDICARE

## 2022-08-05 VITALS
WEIGHT: 162.4 LBS | DIASTOLIC BLOOD PRESSURE: 70 MMHG | SYSTOLIC BLOOD PRESSURE: 110 MMHG | HEIGHT: 76 IN | TEMPERATURE: 96.7 F | BODY MASS INDEX: 19.78 KG/M2 | HEART RATE: 86 BPM

## 2022-08-05 DIAGNOSIS — G89.29 CHRONIC RIGHT SHOULDER PAIN: Primary | ICD-10-CM

## 2022-08-05 DIAGNOSIS — J40 BRONCHITIS: ICD-10-CM

## 2022-08-05 DIAGNOSIS — M25.511 CHRONIC RIGHT SHOULDER PAIN: Primary | ICD-10-CM

## 2022-08-05 DIAGNOSIS — F20.9 SCHIZOPHRENIA, UNSPECIFIED TYPE (HCC): ICD-10-CM

## 2022-08-05 DIAGNOSIS — J44.1 COPD WITH EXACERBATION (HCC): ICD-10-CM

## 2022-08-05 PROCEDURE — 99214 OFFICE O/P EST MOD 30 MIN: CPT | Performed by: FAMILY MEDICINE

## 2022-08-05 RX ORDER — ALBUTEROL SULFATE 90 UG/1
2 AEROSOL, METERED RESPIRATORY (INHALATION) EVERY 6 HOURS PRN
Qty: 8 G | Refills: 5 | Status: SHIPPED | OUTPATIENT
Start: 2022-08-05 | End: 2022-08-08 | Stop reason: SDUPTHER

## 2022-08-05 RX ORDER — MELOXICAM 7.5 MG/1
7.5 TABLET ORAL DAILY PRN
Qty: 30 TABLET | Refills: 1 | Status: SHIPPED | OUTPATIENT
Start: 2022-08-05 | End: 2022-10-07

## 2022-08-05 NOTE — PROGRESS NOTES
Assessment/Plan:      Problem List Items Addressed This Visit        Other    Schizophrenia ([address] )      Other Visit Diagnoses     Chronic right shoulder pain    -  Primary    Relevant Medications    meloxicam (Mobic) 7 5 mg tablet    Other Relevant Orders    Ambulatory referral to Physical Therapy           Plan/Discussion:  Shoulder pain for a few months  Appears to be more of a rotator cuff pathology/tendinopathy  To use mobic as needed  Referral to physical therapy  If not improving will refer to orhtopedics  Fu as needed  Forms completed for new vitae  Psych  Stable  Continue fu with psych at [facility]  Subjective:   Chief Complaint   Patient presents with    Arm Pain     Right         Patient ID: Saji Hinton is a 78 y o  male  Here for months of shoulder pain  No trauma recalled  Pain in the shoulder and going to the elbow and arm  Pain on raising his arm above his head  No numbness, tingling, weakness  No neck pain  Arm Pain           The following portions of the patient's history were reviewed and updated as appropriate: allergies, current medications, past family history, past medical history, past social history, past surgical history and problem list     Review of Systems   Constitutional: Negative for activity change, appetite change, fatigue, fever and unexpected weight change           Objective:  Vitals:    08/05/22 1206   BP: 110/70   Pulse: 86   Temp: (!) 96 7 °F (35 9 °C)   Weight: 73 7 kg (162 lb 6 4 oz)   Height: 6' 4" (1 93 m)     BP Readings from Last 6 Encounters:   08/05/22 110/70   05/12/22 132/84   03/11/22 108/62   03/02/22 115/72   12/09/21 128/68   12/08/21 138/75      Wt Readings from Last 6 Encounters:   08/05/22 73 7 kg (162 lb 6 4 oz)   06/22/22 74 8 kg (165 lb)   05/12/22 75 3 kg (166 lb)   03/11/22 74 4 kg (164 lb)   03/02/22 77 1 kg (170 lb)   12/09/21 78 9 kg (174 lb)             Physical Exam  Vitals and nursing note reviewed  Constitutional:       Appearance: Normal appearance  Musculoskeletal:      Right shoulder: Tenderness present  No swelling, deformity, effusion, bony tenderness or crepitus  Decreased range of motion  Normal strength  Normal pulse  Neurological:      Mental Status: He is alert

## 2022-08-08 DIAGNOSIS — J44.1 COPD WITH EXACERBATION (HCC): ICD-10-CM

## 2022-08-08 DIAGNOSIS — J40 BRONCHITIS: ICD-10-CM

## 2022-08-08 RX ORDER — ALBUTEROL SULFATE 90 UG/1
2 AEROSOL, METERED RESPIRATORY (INHALATION) 2 TIMES DAILY
Qty: 8 G | Refills: 5 | Status: SHIPPED | OUTPATIENT
Start: 2022-08-08

## 2022-08-22 ENCOUNTER — TELEPHONE (OUTPATIENT)
Dept: GASTROENTEROLOGY | Facility: CLINIC | Age: 67
End: 2022-08-22

## 2022-08-22 NOTE — TELEPHONE ENCOUNTER
Left message for Krystal Rivera to return the call to go over the procedure instructions for Bryce's colonoscopy on 8/31

## 2022-08-26 NOTE — TELEPHONE ENCOUNTER
Tried again to reach out to [place] and told her if she didn't call us back we would have to cancel Bryce's procedure  Also tried the emergency contact but phone is disconnected

## 2022-08-29 NOTE — TELEPHONE ENCOUNTER
Called [facility]- where pt resides  Per staff, he is refusing  Canceled/ did not reschedule

## 2022-08-29 NOTE — TELEPHONE ENCOUNTER
Left message again if we do not hear from her today we will have to cancel the procedure scheduled for 8/31

## 2022-09-07 ENCOUNTER — EVALUATION (OUTPATIENT)
Dept: PHYSICAL THERAPY | Facility: CLINIC | Age: 67
End: 2022-09-07
Payer: MEDICARE

## 2022-09-07 DIAGNOSIS — M25.511 CHRONIC RIGHT SHOULDER PAIN: Primary | ICD-10-CM

## 2022-09-07 DIAGNOSIS — G89.29 CHRONIC RIGHT SHOULDER PAIN: Primary | ICD-10-CM

## 2022-09-07 PROCEDURE — 97110 THERAPEUTIC EXERCISES: CPT | Performed by: PHYSICAL THERAPIST

## 2022-09-07 PROCEDURE — 97162 PT EVAL MOD COMPLEX 30 MIN: CPT | Performed by: PHYSICAL THERAPIST

## 2022-09-07 NOTE — PROGRESS NOTES
PT Evaluation     Today's date: 2022  Patient name: Genesis Cha  : 1955  MRN: [id number]  Referring provider: Laure Desai MD  Dx:   Encounter Diagnosis     ICD-10-CM    1  Chronic right shoulder pain  M25 511     G89 29                   Assessment  Assessment details: Genesis Cha is a 78 y o  male who presents with increased R shoulder pain consistent with referring diagnosis of Chronic right shoulder pain  (primary encounter diagnosis) that is moderately complex secondary to insidious and chronic onset, moderate fear avoidance and moderate pain levels  Clinically demonstrates decreased R shoulder ROM, decreased R shoulder strength, limited periscapular proprioception leading to pain with ADLs and exercise  This suggests the need for skilled OPPT to address the above listed impairments, achieve established goals and return to PLOF pain-free  If you have any questions or concerns please contact me at [phone]  Thank you!   Impairments: abnormal coordination, abnormal gait, abnormal muscle firing, abnormal muscle tone, abnormal or restricted ROM, abnormal movement, difficulty understanding, impaired physical strength, lacks appropriate home exercise program, pain with function, safety issue, scapular dyskinesis and poor body mechanics    Symptom irritability: moderateUnderstanding of Dx/Px/POC: good   Prognosis: good    Goals  Short Term Goals (4 weeks)  1 ) Establish independence with HEP  2 ) Decrease subjective pain levels from NPRS at least to 2-5/10 at rest and with activity  3 ) Improve R shoulder ROM at least 5-10 degrees into all planes to allow for improved ease of movement with less guarding    Long Term Goals (8 weeks)  1 ) Improve R shoulder ROM to WNL in all planes to restore normal movement with ADLs and function  2 ) Improve R shoulder strength to 5/5 in all planes in order to return to pain-free ADLs and function  3 ) Improve FOTO score at least to 75 points showing improved self reported disability     Plan  Plan details: Initiate POC for R shoulder ROM, strength and stability; monitor sxs and progress as able  Patient would benefit from: skilled physical therapy and PT eval  Planned modality interventions: biofeedback, cryotherapy, electrical stimulation/Russian stimulation and TENS  Planned therapy interventions: abdominal trunk stabilization, activity modification, ADL retraining, ADL training, balance, balance/weight bearing training, IADL retraining, joint mobilization, manual therapy, neuromuscular re-education, patient education, postural training, self care, sensory integrative techniques, strengthening, stretching, therapeutic activities, therapeutic exercise, therapeutic training, home exercise program, graded motor, graded exercise, graded activity, functional ROM exercises, flexibility, coordination and behavior modification  Frequency: 2x week  Duration in visits: 16  Duration in weeks: 8  Plan of Care beginning date: 2022  Plan of Care expiration date: 2022  Treatment plan discussed with: patient        Subjective Evaluation    History of Present Illness  Date of onset: 2022  Mechanism of injury: Pt is a 78 y o  male who presents with R anterolateral shoulder pain that is consistent with subacromial impingement, with no signs of neurological involvement  Pt reports no specific mechanism of injury, but says that 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  There is a painful arc of motion between 60 and 80 degrees of flexion and abduction  Pt regularly swims and lifts weights at the [facility], and he says that this makes his pain better  Pt also reports some R posterior neck pain, spurling's test negative            Not a recurrent problem   Quality of life: good    Pain  Current pain ratin  At best pain ratin  At worst pain ratin  Location: R shoulder  Quality: dull ache  Relieving factors: rest  Aggravating factors: overhead activity and lifting  Progression: no change    Social Support  Steps to enter house: yes  Stairs in house: yes   Lives in: community-based residential facility  Lives with: adult children    Employment status: working  Hand dominance: right  Exercise history: Swimming, weightlifting regularly      Diagnostic Tests  X-ray: abnormal  Treatments  Current treatment: physical therapy  Patient Goals  Patient goals for therapy: decreased pain, decreased edema, improved balance, increased motion, increased strength, independence with ADLs/IADLs and return to sport/leisure activities  Patient goal: Get back to swimming        Objective     Cervical/Thoracic Screen   Cervical range of motion within normal limits with the following exceptions: + R closing restriction; R opening restriction   (-) spurlings     Neurological Testing     Sensation     Shoulder   Left Shoulder   Intact: light touch    Right Shoulder   Intact: light touch    Active Range of Motion   Left Shoulder   Flexion: 175 degrees   Abduction: 98 degrees   External rotation 0°: 64 degrees   Internal rotation BTB: L3     Right Shoulder   Flexion: 177 degrees   Abduction: 95 degrees   External rotation 0°: 44 degrees   Internal rotation BTB: L3     Passive Range of Motion     Right Shoulder   Flexion: 177 degrees   Abduction: 135 degrees   External rotation 90°: 90 degrees   Internal rotation 90°: 90 degrees     Strength/Myotome Testing     Left Shoulder     Planes of Motion   Flexion: 4+   Abduction: 4+   External rotation at 0°: 4+   Internal rotation at 0°: 4+     Right Shoulder     Planes of Motion   Flexion: 4+   Abduction: 4   External rotation at 0°: 4   Internal rotation at 0°: 4     Tests     Right Shoulder   Positive empty can and Hawkin's  Negative Neer's                Precautions: DM 2; Schitzophrenia; depression  EPOC: 11/8/22  HEP:       Manuals 9/7            R shoulder PROM             R shoulder AP mobilization Neuro Re-Ed             Scapular retraction 20x                                                                                          Ther Ex             AAROM flex and CP             Tband row 10x GTB                                                                                          Ther Activity                                       Gait Training                                       Modalities

## 2022-09-15 ENCOUNTER — OFFICE VISIT (OUTPATIENT)
Dept: PHYSICAL THERAPY | Facility: CLINIC | Age: 67
End: 2022-09-15
Payer: MEDICARE

## 2022-09-15 DIAGNOSIS — M25.511 CHRONIC RIGHT SHOULDER PAIN: Primary | ICD-10-CM

## 2022-09-15 DIAGNOSIS — G89.29 CHRONIC RIGHT SHOULDER PAIN: Primary | ICD-10-CM

## 2022-09-15 PROCEDURE — 97110 THERAPEUTIC EXERCISES: CPT

## 2022-09-15 PROCEDURE — 97112 NEUROMUSCULAR REEDUCATION: CPT

## 2022-09-15 NOTE — PROGRESS NOTES
Daily Note     Today's date: 9/15/2022  Patient name: Denny Bal  : 1955  MRN: [id number]  Referring provider: Alexandru Fernandez MD  Dx:   Encounter Diagnosis     ICD-10-CM    1  Chronic right shoulder pain  M25 511     G89 29        Start Time: 1252  Stop Time: 1020  Total time in clinic (min): 42 minutes    Subjective: Patient states that (R) shoulder continues to be painful since IE  Objective: See treatment diary below  Red TB issued for home use  Assessment: Initiated POC as outlined below  Focus placed on scap stabilization and postural awareness  Hand over hand with max verbal cues utilized for all activities with fair carryover demonstrated  Most challenge noted during wall angels 2* to pain at anterior deltoid  HEP issued and reviewed this visit  Patient would benefit from continued PT  Plan: Continue per plan of care        Precautions: DM 2; Schitzophrenia; depression  EPOC: 22  HEP Access Code: [email or web address]        Manuals 9/7 9/15           R shoulder PROM  NV           R shoulder AP mobilization                                       Neuro Re-Ed             Scapular retraction 20x 30x           T/S ext (half foam)  10"x10           Open Books  x10 ea           Shrugs  x20           Bilat ER TB  x15 RTB                                     Ther Ex             AAROM flex and CP             Tband row 10x GTB 10x  RTB           TB ext  10x RTB           Wall Slides  x15 flex, scaption           Wall angels   5"  2x5           Supine Cane flex  5"x15                                      Ther Activity                                       Gait Training                                       Modalities

## 2022-09-19 ENCOUNTER — OFFICE VISIT (OUTPATIENT)
Dept: PHYSICAL THERAPY | Facility: CLINIC | Age: 67
End: 2022-09-19
Payer: MEDICARE

## 2022-09-19 DIAGNOSIS — G89.29 CHRONIC RIGHT SHOULDER PAIN: Primary | ICD-10-CM

## 2022-09-19 DIAGNOSIS — M25.511 CHRONIC RIGHT SHOULDER PAIN: Primary | ICD-10-CM

## 2022-09-19 PROCEDURE — 97112 NEUROMUSCULAR REEDUCATION: CPT

## 2022-09-19 PROCEDURE — 97140 MANUAL THERAPY 1/> REGIONS: CPT

## 2022-09-19 PROCEDURE — 97110 THERAPEUTIC EXERCISES: CPT

## 2022-09-19 NOTE — PROGRESS NOTES
Daily Note     Today's date: 2022  Patient name: Yuly Kaplan  : 1955  MRN: [id number]  Referring provider: Silvia Tripp MD  Dx:   Encounter Diagnosis     ICD-10-CM    1  Chronic right shoulder pain  M25 511     G89 29        Start Time: 1000  Stop Time: 1045  Total time in clinic (min): 45 minutes    Subjective: Patient states that (R) shoulder continues to be painful since IE  Objective: See treatment diary below  AROM flex and scap added this session  Assessment: Patient continues with max verbal and tactile cues for correct performance of exercise  Fair performance with AROM flex and scap  Good response to manuals with no pain exhibited at end range  Patient would benefit from continued PT  Plan: Continue per plan of care  Precautions: DM 2; Schitzophrenia; depression  EPOC: 22  HEP Access Code: [email or web address]        Manuals 9/7 9/15 9/19          R shoulder PROM  NV LQ          R shoulder AP mobilization                8 min                       Neuro Re-Ed             Scapular retraction 20x 30x 20x          T/S ext (half foam)  10"x10 10"x10          Open Books  x10 ea x10 ea          Shrugs  x20 x10          Bilat ER TB  x15 RTB x20 RTB                                    Ther Ex             AAROM flex and CP             Tband row 10x GTB 10x  RTB GTB 2x10          TB ext  10x RTB GTB 2x10          Wall Slides  x15 flex, scaption X15, 5" flex, scaption          Wall angels   5"  2x5 5"  2x8          Supine Cane flex  5"x15  5"x20          AROM flex & scap   @90*,  x10 ea                       Ther Activity             UBE   NV?                        Gait Training                                       Modalities

## 2022-09-26 ENCOUNTER — OFFICE VISIT (OUTPATIENT)
Dept: PHYSICAL THERAPY | Facility: CLINIC | Age: 67
End: 2022-09-26
Payer: MEDICARE

## 2022-09-26 DIAGNOSIS — G89.29 CHRONIC RIGHT SHOULDER PAIN: Primary | ICD-10-CM

## 2022-09-26 DIAGNOSIS — M25.511 CHRONIC RIGHT SHOULDER PAIN: Primary | ICD-10-CM

## 2022-09-26 PROCEDURE — 97110 THERAPEUTIC EXERCISES: CPT | Performed by: PHYSICAL THERAPIST

## 2022-09-26 PROCEDURE — 97140 MANUAL THERAPY 1/> REGIONS: CPT | Performed by: PHYSICAL THERAPIST

## 2022-09-26 NOTE — PROGRESS NOTES
Daily Note     Today's date: 2022  Patient name: Claudette Starcher  : 1955  MRN: [id number]  Referring provider: Tracey Elaine MD  Dx:   Encounter Diagnosis     ICD-10-CM    1  Chronic right shoulder pain  M25 511     G89 29                   Subjective: Pt reports that shoulder has been doing better and that he is swimming at the gym with no problems  Objective: See treatment diary below      Assessment: Pt continues to demonstrate weakness in R shoulder abductors and external rotators  There is no pain with passive range of motion of shoulder, but actively moving towards 90 deg abduction and external rotation provokes dull pain  Pt requires demonstrations and verbal cues to show proper form on exercises  Pt can continue to benefit from skilled therapy to strengthen R shoulder abductors and external rotators in order to improve strength and function  Plan: Continue per plan of care        Precautions: DM 2; Schitzophrenia; depression  EPOC: 22  HEP Access Code: [email or web address]        Manuals 9/7 9/15 9/19 9/26         R shoulder PROM  NV LQ CC         R shoulder AP mobilization    CC            8 min                       Neuro Re-Ed             Scapular retraction 20x 30x 20x          T/S ext (half foam)  10"x10 10"x10          Open Books  x10 ea x10 ea          Shrugs  x20 x10          Bilat ER TB  x15 RTB x20 RTB x20 RTB                                   Ther Ex             AAROM flex and CP             Tband row 10x GTB 10x  RTB GTB 2x10 GTB 2x10         TB ext  10x RTB GTB 2x10 GTB 2x10         Wall Slides  x15 flex, scaption X15, 5" flex, scaption x15         Wall angels   5"  2x5 5"  2x8 5" 2*10         Supine Cane flex  5"x15  5"x20          AROM flex & scap   @90*,  x10 ea                       Ther Activity             UBE   NV? 5"                      Gait Training                                       Modalities

## 2022-09-29 ENCOUNTER — APPOINTMENT (OUTPATIENT)
Dept: PHYSICAL THERAPY | Facility: CLINIC | Age: 67
End: 2022-09-29
Payer: MEDICARE

## 2022-10-03 ENCOUNTER — OFFICE VISIT (OUTPATIENT)
Dept: PHYSICAL THERAPY | Facility: CLINIC | Age: 67
End: 2022-10-03
Payer: MEDICARE

## 2022-10-03 DIAGNOSIS — G89.29 CHRONIC RIGHT SHOULDER PAIN: Primary | ICD-10-CM

## 2022-10-03 DIAGNOSIS — M25.511 CHRONIC RIGHT SHOULDER PAIN: Primary | ICD-10-CM

## 2022-10-03 PROCEDURE — 97110 THERAPEUTIC EXERCISES: CPT

## 2022-10-03 PROCEDURE — 97140 MANUAL THERAPY 1/> REGIONS: CPT

## 2022-10-03 NOTE — PROGRESS NOTES
Daily Note     Today's date: 10/3/2022  Patient name: Tammy Lowe  : 1955  MRN: [id number]  Referring provider: Michel Lai MD  Dx:   Encounter Diagnosis     ICD-10-CM    1  Chronic right shoulder pain  M25 511     G89 29                   Subjective: Pt reports that his shoulder is a little more sore today due to falling up the steps while holding his dinner plate over the weekend  Objective: See treatment diary below      Assessment: Pt showed tenderness and discomfort when stretching into end ranges of flexion, ABD, and IR today  He also had difficulty with an AA shoulder flexion with cane due to pain  Crepitus noted with any active shoulder flexion or ABD  Able to tolerate scapular strengthening without pain  Plan: Continue per plan of care        Precautions: DM 2; Schitzophrenia; depression  EPOC: 22  HEP Access Code: [email or web address]        Manuals 9/7 9/15 9/19 9/26 10/3        R shoulder PROM  NV LQ CC WE        R shoulder AP mobilization    CC            8 min                       Neuro Re-Ed             Scapular retraction 20x 30x 20x  20x        T/S ext (half foam)  10"x10 10"x10          Open Books  x10 ea x10 ea          Shrugs  x20 x10  20x ea        Bilat ER TB  x15 RTB x20 RTB x20 RTB RTB  x20                                  Ther Ex             AAROM flex and CP             Tband row 10x GTB 10x  RTB GTB 2x10 GTB 2x10 GTB 2x10        TB ext  10x RTB GTB 2x10 GTB 2x10 GTB 2x10        Wall Slides  x15 flex, scaption X15, 5" flex, scaption x15 x15        Wall angels   5"  2x5 5"  2x8 5" 2*10 x10        Supine Cane flex  5"x15  5"x20  x20        AROM flex & scap   @90*,  x10 ea                       Ther Activity             UBE   NV? 5" 2'/2'                     Gait Training                                       Modalities

## 2022-10-06 ENCOUNTER — OFFICE VISIT (OUTPATIENT)
Dept: PHYSICAL THERAPY | Facility: CLINIC | Age: 67
End: 2022-10-06
Payer: MEDICARE

## 2022-10-06 DIAGNOSIS — G89.29 CHRONIC RIGHT SHOULDER PAIN: Primary | ICD-10-CM

## 2022-10-06 DIAGNOSIS — M25.511 CHRONIC RIGHT SHOULDER PAIN: Primary | ICD-10-CM

## 2022-10-06 PROCEDURE — 97140 MANUAL THERAPY 1/> REGIONS: CPT | Performed by: PHYSICAL THERAPIST

## 2022-10-06 PROCEDURE — 97110 THERAPEUTIC EXERCISES: CPT | Performed by: PHYSICAL THERAPIST

## 2022-10-06 NOTE — PROGRESS NOTES
Daily Note     Today's date: 10/6/2022  Patient name: Zhanna Combs  : 1955  MRN: [id number]  Referring provider: Salinas Smith MD  Dx:   Encounter Diagnosis     ICD-10-CM    1  Chronic right shoulder pain  M25 511     G89 29        Start Time: 1117  Stop Time: 1158  Total time in clinic (min): 41 minutes    Subjective: states he sometimes feels pain, especially in the morning but right now doesn't have any pain  Objective: See treatment diary below      Assessment: Good tolerance for PROM today -  p manuals PROM increased to [facility] in all planes w/out pain complaints  AROM only to about 90 degrees flexion and abd  Difficulty w/ wall angles due to pain  Will benefit from continued PT  Plan: Continue per plan of care        Precautions: DM 2; Schitzophrenia; depression  EPOC: 22  HEP Access Code: [email or web address]        Manuals 9/7 9/15 9/19 9/26 10/3 10/6       R shoulder PROM  NV LQ CC WE JR       R shoulder AP mobilization    CC            8 min                       Neuro Re-Ed             Scapular retraction 20x 30x 20x  20x 20x       T/S ext (half foam)  10"x10 10"x10   10x10"       Open Books  x10 ea x10 ea          Shrugs  x20 x10  20x ea 20x       Bilat ER TB  x15 RTB x20 RTB x20 RTB RTB  x20 RTB 20x                                 Ther Ex             AAROM flex and CP             Tband row 10x GTB 10x  RTB GTB 2x10 GTB 2x10 GTB 2x10 GTB 2x10       TB ext  10x RTB GTB 2x10 GTB 2x10 GTB 2x10 GTB 2x10       Wall Slides  x15 flex, scaption X15, 5" flex, scaption x15 x15 15-20 ea       Wall angels   5"  2x5 5"  2x8 5" 2*10 x10 10x       Supine Cane flex  5"x15  5"x20  x20 20x hands clasp       AROM flex & scap   @90*,  x10 ea   10ea       butterflies      20x       Ther Activity             UBE   NV? 5" 2'/2' 3'/3'                    Gait Training                                       Modalities

## 2022-10-07 ENCOUNTER — OFFICE VISIT (OUTPATIENT)
Dept: FAMILY MEDICINE CLINIC | Facility: HOSPITAL | Age: 67
End: 2022-10-07
Payer: MEDICARE

## 2022-10-07 VITALS
DIASTOLIC BLOOD PRESSURE: 68 MMHG | HEIGHT: 76 IN | HEART RATE: 69 BPM | SYSTOLIC BLOOD PRESSURE: 120 MMHG | OXYGEN SATURATION: 97 % | WEIGHT: 168 LBS | BODY MASS INDEX: 20.46 KG/M2 | RESPIRATION RATE: 16 BRPM

## 2022-10-07 DIAGNOSIS — E44.1 MILD PROTEIN-CALORIE MALNUTRITION (HCC): ICD-10-CM

## 2022-10-07 DIAGNOSIS — M19.011 PRIMARY OSTEOARTHRITIS OF RIGHT SHOULDER: ICD-10-CM

## 2022-10-07 DIAGNOSIS — J41.1 MUCOPURULENT CHRONIC BRONCHITIS (HCC): ICD-10-CM

## 2022-10-07 DIAGNOSIS — E11.9 TYPE 2 DIABETES MELLITUS WITHOUT COMPLICATION, WITHOUT LONG-TERM CURRENT USE OF INSULIN (HCC): Primary | ICD-10-CM

## 2022-10-07 DIAGNOSIS — N40.1 BENIGN PROSTATIC HYPERPLASIA WITH NOCTURIA: ICD-10-CM

## 2022-10-07 DIAGNOSIS — F17.200 TOBACCO DEPENDENCE: ICD-10-CM

## 2022-10-07 DIAGNOSIS — E78.2 MIXED HYPERLIPIDEMIA: ICD-10-CM

## 2022-10-07 DIAGNOSIS — R35.1 BENIGN PROSTATIC HYPERPLASIA WITH NOCTURIA: ICD-10-CM

## 2022-10-07 PROBLEM — M70.22 OLECRANON BURSITIS OF LEFT ELBOW: Status: RESOLVED | Noted: 2021-11-02 | Resolved: 2022-10-07

## 2022-10-07 PROBLEM — IMO0001 SMOKING: Status: RESOLVED | Noted: 2020-12-24 | Resolved: 2022-10-07

## 2022-10-07 PROCEDURE — 99214 OFFICE O/P EST MOD 30 MIN: CPT | Performed by: INTERNAL MEDICINE

## 2022-10-07 RX ORDER — HALOPERIDOL 10 MG/1
TABLET ORAL
COMMUNITY
Start: 2022-09-28

## 2022-10-07 RX ORDER — BENZTROPINE MESYLATE 1 MG/1
TABLET ORAL
COMMUNITY
Start: 2022-09-28

## 2022-10-07 RX ORDER — OMEGA-3 FATTY ACIDS/FISH OIL 300-1000MG
CAPSULE ORAL
COMMUNITY

## 2022-10-07 NOTE — ASSESSMENT & PLAN NOTE
Patient has BPH with nocturia  He denies urinary tension  Denies dizziness or lightheadedness with when standing up    He will continue Flomax

## 2022-10-07 NOTE — ASSESSMENT & PLAN NOTE
Lab Results   Component Value Date    HGBA1C 6 1 (H) 04/19/2022       Patient has type 2 diabetes  He denies diarrhea on metformin  He is due for his diabetic checks    I ordered A1c

## 2022-10-07 NOTE — ASSESSMENT & PLAN NOTE
Malnutrition Findings:                                 BMI Findings: Body mass index is 20 45 kg/m²  Patient's weight is 168 lb  He has atrophy of the muscles of the arms and intercostal muscles  He weight 170 lb in March  Has mild protein calorie malnutrition  Denies loss of appetite    I encouraged him to eat

## 2022-10-07 NOTE — PROGRESS NOTES
Assessment/Plan:    Primary osteoarthritis of right shoulder  Patient has chronic right shoulder pain which is worse when lays on the right shoulder at night or after waking in the morning  Is also worse when shoulder  On examination he has restricted abduction and has crepitus passive range of motion  I suspect has osteoarthritis right shoulder  He is undergoing physical therapy but feels he does not help enough  Will get x-ray to confirm osteoarthritis I will refer patient to orthopedics  Type 2 diabetes mellitus without complication, without long-term current use of insulin ([facility])    Lab Results   Component Value Date    HGBA1C 6 1 (H) 04/19/2022       Patient has type 2 diabetes  He denies diarrhea on metformin  He is due for his diabetic checks  I ordered A1c    Mild protein-calorie malnutrition ([facility] [address] )  Malnutrition Findings:                                 BMI Findings: Body mass index is 20 45 kg/m²  Patient's weight is 168 lb  He has atrophy of the muscles of the arms and intercostal muscles  He weight 170 lb in March  Has mild protein calorie malnutrition  Denies loss of appetite  I encouraged him to eat    Benign prostatic hyperplasia with nocturia  Patient has BPH with nocturia  He denies urinary tension  Denies dizziness or lightheadedness with when standing up  He will continue Flomax    Mixed hyperlipidemia  Patient has hyperlipidemia  Denies myalgias  Will check his lipid profile  Continue pravastatin       Diagnoses and all orders for this visit:    Type 2 diabetes mellitus without complication, without long-term current use of insulin (HCC)  -     Hemoglobin A1C; Future  -     Comprehensive metabolic panel; Future  -     Lipid Panel with Direct LDL reflex;  Future    Mucopurulent chronic bronchitis (HCC)    Benign prostatic hyperplasia with nocturia    Primary osteoarthritis of right shoulder  -     XR shoulder 2+ vw right; Future  -     Ambulatory referral to Orthopedic Surgery; Future    Mild protein-calorie malnutrition (HCC)    Mixed hyperlipidemia    Tobacco dependence  -     nicotine polacrilex (Nicorette) 2 mg gum; Chew 1 each (2 mg total) as needed for smoking cessation    Other orders  -     haloperidol (HALDOL) 10 mg tablet; 1 hs  -     benztropine (COGENTIN) 1 mg tablet; 1 in the am  -     Ibuprofen 200 MG CAPS; Take by mouth 2 every 8 hrs prn pain        Tobacco Cessation Counseling: Tobacco cessation counseling was provided  The patient is sincerely urged to quit consumption of tobacco  He is ready to quit tobacco  Medication options and side effects of medication discussed  Will try Nicorette gum         Subjective:      Patient ID: Claudette Starcher is a 78 y o  male  HPI    Patient presents for follow-up of chronic conditions as detailed in the assessment and plan  The following portions of the patient's history were reviewed and updated as appropriate: current medications, past family history, past medical history, past social history, past surgical history and problem list     Review of Systems      Objective:    /68   Pulse 69   Resp 16   Ht 6' 4" (1 93 m)   Wt 76 2 kg (168 lb)   SpO2 97%   BMI 20 45 kg/m²      Physical Exam  Constitutional:       General: He is not in acute distress  Appearance: He is not toxic-appearing  HENT:      Head: Normocephalic  Right Ear: Tympanic membrane normal       Left Ear: Tympanic membrane normal       Mouth/Throat:      Mouth: Mucous membranes are moist       Pharynx: No oropharyngeal exudate  Eyes:      Conjunctiva/sclera: Conjunctivae normal    Cardiovascular:      Rate and Rhythm: Normal rate and regular rhythm  Heart sounds: No murmur heard  Pulmonary:      Effort: No respiratory distress  Breath sounds: No wheezing or rales  Abdominal:      General: Bowel sounds are normal  There is no distension  Palpations: Abdomen is soft  There is no mass        Tenderness: There is no abdominal tenderness  Skin:     General: Skin is warm  Neurological:      Mental Status: He is alert  Motor: No weakness        Gait: Gait normal    Psychiatric:         Mood and Affect: Mood normal              Barnet Speed

## 2022-10-07 NOTE — ASSESSMENT & PLAN NOTE
Patient has chronic right shoulder pain which is worse when lays on the right shoulder at night or after waking in the morning  Is also worse when shoulder  On examination he has restricted abduction and has crepitus passive range of motion  I suspect has osteoarthritis right shoulder  He is undergoing physical therapy but feels he does not help enough  Will get x-ray to confirm osteoarthritis I will refer patient to orthopedics

## 2022-10-10 ENCOUNTER — OFFICE VISIT (OUTPATIENT)
Dept: PHYSICAL THERAPY | Facility: CLINIC | Age: 67
End: 2022-10-10
Payer: MEDICARE

## 2022-10-10 DIAGNOSIS — M25.511 CHRONIC RIGHT SHOULDER PAIN: Primary | ICD-10-CM

## 2022-10-10 DIAGNOSIS — G89.29 CHRONIC RIGHT SHOULDER PAIN: Primary | ICD-10-CM

## 2022-10-10 PROCEDURE — 97110 THERAPEUTIC EXERCISES: CPT | Performed by: PHYSICAL THERAPIST

## 2022-10-10 PROCEDURE — 97140 MANUAL THERAPY 1/> REGIONS: CPT | Performed by: PHYSICAL THERAPIST

## 2022-10-10 PROCEDURE — 97112 NEUROMUSCULAR REEDUCATION: CPT | Performed by: PHYSICAL THERAPIST

## 2022-10-10 NOTE — PROGRESS NOTES
Daily Note     Today's date: 10/10/2022  Patient name: Saji Hinton  : 1955  MRN: [id number]  Referring provider: Lea Kilgore MD  Dx:   Encounter Diagnosis     ICD-10-CM    1  Chronic right shoulder pain  M25 511     G89 29                   Subjective: Pt says that arm is a little achy today because he was swimming  Objective: See treatment diary below      Assessment: Pt's primary limitation is R shoulder abduction range of motion  Passive range of motion reveals capsular end-feel at about 80 deg  Pt often requires tactile/verbal cues and demonstrations to perform exercises with adequate form  Pt demonstrates guarding and fear-avoidance when attempting to abduct R shoulder  Pt can continue to benefit from skilled therapy to improve R shoulder abduction range of motion and to decrease pain  Plan: Continue per plan of care        Precautions: DM 2; Schitzophrenia; depression  EPOC: 22  HEP Access Code: [email or web address]        Manuals 9/7 9/15 9/19 9/26 10/3 10/6 10/10      R shoulder PROM  NV LQ CC WE JR CC      R shoulder AP mobilization    CC   CC         8 min    8 min                   Neuro Re-Ed             Scapular retraction 20x 30x 20x  20x 20x 20x      T/S ext (half foam)  10"x10 10"x10   10x10"       Open Books  x10 ea x10 ea    x10 ea      Shrugs  x20 x10  20x ea 20x       Bilat ER TB  x15 RTB x20 RTB x20 RTB RTB  x20 RTB 20x                                 Ther Ex             AAROM flex and abduction       CC      Tband row 10x GTB 10x  RTB GTB 2x10 GTB 2x10 GTB 2x10 GTB 2x10 BTB 3x10      TB ext  10x RTB GTB 2x10 GTB 2x10 GTB 2x10 GTB 2x10       Wall Slides  x15 flex, scaption X15, 5" flex, scaption x15 x15 15-20 ea 2x15 abduction      Wall angels   5"  2x5 5"  2x8 5" 2*10 x10 10x 2x10      Supine Cane flex  5"x15  5"x20  x20 20x hands clasp       AROM flex & scap   @90*,  x10 ea   10ea       butterflies      20x       Ther Activity             UBE   NV? 5" 2'/2' 3'/3' 2"/2" Gait Training                                       Modalities

## 2022-10-17 ENCOUNTER — LAB (OUTPATIENT)
Dept: LAB | Facility: HOSPITAL | Age: 67
End: 2022-10-17
Attending: INTERNAL MEDICINE
Payer: MEDICARE

## 2022-10-17 ENCOUNTER — HOSPITAL ENCOUNTER (OUTPATIENT)
Dept: RADIOLOGY | Facility: HOSPITAL | Age: 67
Discharge: HOME | End: 2022-10-17
Attending: INTERNAL MEDICINE
Payer: MEDICARE

## 2022-10-17 DIAGNOSIS — E11.9 TYPE 2 DIABETES MELLITUS WITHOUT COMPLICATION, WITHOUT LONG-TERM CURRENT USE OF INSULIN (HCC): ICD-10-CM

## 2022-10-17 DIAGNOSIS — M19.011 PRIMARY OSTEOARTHRITIS OF RIGHT SHOULDER: ICD-10-CM

## 2022-10-17 LAB
ALBUMIN SERPL BCP-MCNC: 3.8 G/DL (ref 3.5–5)
ALP SERPL-CCNC: 71 U/L (ref 46–116)
ALT SERPL W P-5'-P-CCNC: 30 U/L (ref 12–78)
ANION GAP SERPL CALCULATED.3IONS-SCNC: 6 MMOL/L (ref 4–13)
AST SERPL W P-5'-P-CCNC: 18 U/L (ref 5–45)
BILIRUB SERPL-MCNC: 0.4 MG/DL (ref 0.2–1)
BUN SERPL-MCNC: 13 MG/DL (ref 5–25)
CALCIUM SERPL-MCNC: 9.9 MG/DL (ref 8.3–10.1)
CHLORIDE SERPL-SCNC: 100 MMOL/L (ref 96–108)
CHOLEST SERPL-MCNC: 196 MG/DL
CO2 SERPL-SCNC: 29 MMOL/L (ref 21–32)
CREAT SERPL-MCNC: 0.93 MG/DL (ref 0.6–1.3)
EST. AVERAGE GLUCOSE BLD GHB EST-MCNC: 123 MG/DL
GFR SERPL CREATININE-BSD FRML MDRD: 85 ML/MIN/1.73SQ M
GLUCOSE P FAST SERPL-MCNC: 102 MG/DL (ref 65–99)
HBA1C MFR BLD: 5.9 %
HDLC SERPL-MCNC: 72 MG/DL
LDLC SERPL CALC-MCNC: 115 MG/DL (ref 0–100)
POTASSIUM SERPL-SCNC: 4.1 MMOL/L (ref 3.5–5.3)
PROT SERPL-MCNC: 7.2 G/DL (ref 6.4–8.4)
SODIUM SERPL-SCNC: 135 MMOL/L (ref 135–147)
TRIGL SERPL-MCNC: 43 MG/DL

## 2022-10-17 PROCEDURE — 80061 LIPID PANEL: CPT

## 2022-10-17 PROCEDURE — 73030 X-RAY EXAM OF SHOULDER: CPT

## 2022-10-17 PROCEDURE — 80053 COMPREHEN METABOLIC PANEL: CPT

## 2022-10-17 PROCEDURE — 36415 COLL VENOUS BLD VENIPUNCTURE: CPT

## 2022-10-17 PROCEDURE — 83036 HEMOGLOBIN GLYCOSYLATED A1C: CPT

## 2022-10-17 RX ORDER — PRAVASTATIN SODIUM 40 MG
40 TABLET ORAL
Qty: 30 TABLET | Refills: 5 | Status: SHIPPED | OUTPATIENT
Start: 2022-10-17

## 2022-10-17 NOTE — RESULT ENCOUNTER NOTE
Call patient: Labs Show normal electrolytes, normal kidney function, normal liver function tests  Blood sugar was 102, controlled    LDL cholesterol is still high, I increased the dose of pravastatin to 40 mg daily and sent new prescription to the pharmacy

## 2022-10-19 ENCOUNTER — OFFICE VISIT (OUTPATIENT)
Dept: OBGYN CLINIC | Facility: CLINIC | Age: 67
End: 2022-10-19
Payer: MEDICARE

## 2022-10-19 VITALS
HEIGHT: 76 IN | SYSTOLIC BLOOD PRESSURE: 118 MMHG | BODY MASS INDEX: 20.22 KG/M2 | WEIGHT: 166 LBS | DIASTOLIC BLOOD PRESSURE: 80 MMHG

## 2022-10-19 DIAGNOSIS — M77.8 TENDINITIS OF RIGHT SHOULDER: Primary | ICD-10-CM

## 2022-10-19 LAB — HBA1C MFR BLD HPLC: 5.9 %

## 2022-10-19 PROCEDURE — 20610 DRAIN/INJ JOINT/BURSA W/O US: CPT | Performed by: ORTHOPAEDIC SURGERY

## 2022-10-19 PROCEDURE — 99213 OFFICE O/P EST LOW 20 MIN: CPT | Performed by: ORTHOPAEDIC SURGERY

## 2022-10-19 RX ORDER — BUPIVACAINE HYDROCHLORIDE 2.5 MG/ML
4 INJECTION, SOLUTION INFILTRATION; PERINEURAL
Status: COMPLETED | OUTPATIENT
Start: 2022-10-19 | End: 2022-10-19

## 2022-10-19 RX ORDER — BETAMETHASONE SODIUM PHOSPHATE AND BETAMETHASONE ACETATE 3; 3 MG/ML; MG/ML
6 INJECTION, SUSPENSION INTRA-ARTICULAR; INTRALESIONAL; INTRAMUSCULAR; SOFT TISSUE
Status: COMPLETED | OUTPATIENT
Start: 2022-10-19 | End: 2022-10-19

## 2022-10-19 RX ADMIN — BETAMETHASONE SODIUM PHOSPHATE AND BETAMETHASONE ACETATE 6 MG: 3; 3 INJECTION, SUSPENSION INTRA-ARTICULAR; INTRALESIONAL; INTRAMUSCULAR; SOFT TISSUE at 12:33

## 2022-10-19 RX ADMIN — BUPIVACAINE HYDROCHLORIDE 4 ML: 2.5 INJECTION, SOLUTION INFILTRATION; PERINEURAL at 12:33

## 2022-10-19 NOTE — PROGRESS NOTES
Assessment:     1  Tendinitis of right shoulder        Plan:     Problem List Items Addressed This Visit        Musculoskeletal and Integument    Tendinitis of right shoulder - Primary     Findings today are consistent with tendonitis of the right shoulder  Imaging and prognosis was reviewed with the patient today  Discussed with patient that the treatment for this injury is CSI and physical therapy  Cortisone steroid injection was administered today to the right shoulder  Patient should avoid strenuous activities for the next 1-2 days  Advised patient to monitor his glucose levels closely for the next few days since patient is diabetic  Cold compress over the right shoulder today  If patient feels relief with the cortisone injections, procedure can be repeated every 3 months  Patient should continue with physical therapy to work on strengthening and ROM  Avoid repetitive overhead motions  Patient can take anti-inflammatories as needed for pain  Patient can follow up in 2 months  All patient's questions were answered to his satisfaction  This note is created using dictation transcription  It may contain typographical errors, grammatical errors, improperly dictated words, background noise and other errors  Relevant Medications    bupivacaine (MARCAINE) 0 25 % injection 4 mL (Completed)    betamethasone acetate-betamethasone sodium phosphate (CELESTONE) injection 6 mg (Completed)    Other Relevant Orders    Large joint arthrocentesis: R subacromial bursa (Completed)         Subjective:     Patient ID: Yuly Kaplan is a 78 y o  male  Chief Complaint:  The patient presents with a chief complaint of right shoulder pain  Patient is referred here by Dr Matilda Calzada  The pain began a few months(s) ago and is not associated with an acute injury  Patient notes he was using a can crushing machine in a repetitive manner by pulling on a machine handle   The patient describes the pain as sharp in intensity, intermittent in timing, and localizes the pain to the  right anterior shoulder and radiates down into his bicep  The pain is worse with movement, overhead work, overuse and raising arm over head and relieved by rest, heat, medication: Ibuprofen, Tylenol used and beneficial, avoiding the painful activities  The pain is not associated with numbness and tingling  The pain is not associated with constitutional symptoms  The patient is awoken at night by the pain  The patient has had physical therapy for about a month  Information on patient's intake form was reviewed  Allergy:  No Known Allergies  Medications:  all current active meds have been reviewed  Past Medical History:  Past Medical History:   Diagnosis Date   • Atopic rhinitis    • BPH with urinary obstruction    • COPD (chronic obstructive pulmonary disease) ([facility] )    • Hyperlipidemia    • Incomplete bladder emptying    • Schizophrenia ([facility] )      Past Surgical History:  History reviewed  No pertinent surgical history  Family History:  Family History   Problem Relation Age of Onset   • Dementia Mother    • Heart attack Father    • Substance Abuse Sister         cigarettes   • Substance Abuse Brother    • Alcohol abuse Brother      Social History:  Social History     Substance and Sexual Activity   Alcohol Use No    Comment: hx  of hard liquor, regular use, quit at age 33  Social History     Substance and Sexual Activity   Drug Use No     Social History     Tobacco Use   Smoking Status Current Every Day Smoker   • Packs/day: 1 00   • Years: 45 00   • Pack years: 45 00   Smokeless Tobacco Never Used     Review of Systems   Constitutional: Negative for chills and fever  HENT: Negative for ear pain and sore throat  Eyes: Negative for pain and visual disturbance  Respiratory: Negative for cough and shortness of breath  Cardiovascular: Negative for chest pain and palpitations  Gastrointestinal: Negative for abdominal pain and vomiting  Genitourinary: Negative for dysuria and hematuria  Musculoskeletal: Positive for arthralgias (Right shoulder)  Negative for back pain and neck pain  Skin: Negative for color change and rash  Neurological: Negative for seizures and syncope  Psychiatric/Behavioral: Negative  All other systems reviewed and are negative  Objective:  BP Readings from Last 1 Encounters:   10/19/22 118/80      Wt Readings from Last 1 Encounters:   10/19/22 75 3 kg (166 lb)      BMI:   Estimated body mass index is 20 21 kg/m² as calculated from the following:    Height as of this encounter: 6' 4" (1 93 m)  Weight as of this encounter: 75 3 kg (166 lb)  BSA:   Estimated body surface area is 2 05 meters squared as calculated from the following:    Height as of this encounter: 6' 4" (1 93 m)  Weight as of this encounter: 75 3 kg (166 lb)  Physical Exam  Vitals and nursing note reviewed  Constitutional:       Appearance: Normal appearance  He is well-developed  HENT:      Head: Normocephalic and atraumatic  Right Ear: External ear normal       Left Ear: External ear normal    Eyes:      Extraocular Movements: Extraocular movements intact  Conjunctiva/sclera: Conjunctivae normal    Pulmonary:      Effort: Pulmonary effort is normal    Musculoskeletal:      Cervical back: Neck supple  Skin:     General: Skin is warm and dry  Neurological:      Mental Status: He is alert and oriented to person, place, and time  Psychiatric:         Mood and Affect: Mood normal          Behavior: Behavior normal        Right Shoulder Exam     Tenderness   The patient is experiencing no tenderness  Range of Motion   Active abduction: normal Right shoulder active abduction: Pain  Passive abduction: normal   Forward flexion: normal   Internal rotation 0 degrees: T12     Muscle Strength   Right shoulder normal muscle strength: Pain with resistant abduction    Abduction: 5/5   Internal rotation: 5/5   External rotation: 5/5   Biceps: 5/5     Tests   Cross arm: negative  Impingement: positive  Drop arm: negative    Other   Erythema: absent  Scars: absent  Sensation: normal  Pulse: present            Large joint arthrocentesis: R subacromial bursa  Universal Protocol:  Consent: Verbal consent obtained  Risks and benefits: risks, benefits and alternatives were discussed  Consent given by: patient  Patient understanding: patient states understanding of the procedure being performed  Site marked: the operative site was marked  Patient identity confirmed: verbally with patient    Supporting Documentation  Indications: pain   Procedure Details  Location: shoulder - R subacromial bursa  Preparation: Patient was prepped and draped in the usual sterile fashion  Needle size: 22 G  Ultrasound guidance: no  Approach: posterolateral  Medications administered: 4 mL bupivacaine 0 25 %; 6 mg betamethasone acetate-betamethasone sodium phosphate 6 (3-3) mg/mL    Patient tolerance: patient tolerated the procedure well with no immediate complications  Dressing:  Sterile dressing applied        I have personally reviewed pertinent films in PACS and my interpretation is XR right shoulder demonstrates mild degenerative changes  No soft tissue calcification       Scribe Attestation    I,:  [place] Mike am acting as a scribe while in the presence of the attending physician :       I,:  Jane Hernandez MD personally performed the services described in this documentation    as scribed in my presence :

## 2022-10-19 NOTE — ASSESSMENT & PLAN NOTE
Findings today are consistent with tendonitis of the right shoulder  Imaging and prognosis was reviewed with the patient today  Discussed with patient that the treatment for this injury is CSI and physical therapy  Cortisone steroid injection was administered today to the right shoulder  Patient should avoid strenuous activities for the next 1-2 days  Cold compress over the right shoulder today  If patient feels relief with the cortisone injections, procedure can be repeated every 3 months  Patient should continue with physical therapy to work on strengthening and ROM  Avoid repetitive overhead motions  Patient can take anti-inflammatories as needed for pain  Patient can follow up in 2 months  All patient's questions were answered to his satisfaction  This note is created using dictation transcription  It may contain typographical errors, grammatical errors, improperly dictated words, background noise and other errors

## 2022-10-21 NOTE — RESULT ENCOUNTER NOTE
Call patient: Greysone Heman well controlled diabetes, normal electrolytes, normal kidney and liver functions

## 2022-10-31 ENCOUNTER — HOSPITAL ENCOUNTER (OUTPATIENT)
Dept: CT IMAGING | Facility: HOSPITAL | Age: 67
Discharge: HOME | End: 2022-10-31
Attending: STUDENT IN AN ORGANIZED HEALTH CARE EDUCATION/TRAINING PROGRAM

## 2022-10-31 DIAGNOSIS — Z87.891 PERSONAL HISTORY OF NICOTINE DEPENDENCE: ICD-10-CM

## 2022-10-31 DIAGNOSIS — F17.200 SMOKING: ICD-10-CM

## 2022-11-18 ENCOUNTER — OFFICE VISIT (OUTPATIENT)
Dept: FAMILY MEDICINE CLINIC | Facility: HOSPITAL | Age: 67
End: 2022-11-18

## 2022-11-18 VITALS
BODY MASS INDEX: 19.85 KG/M2 | HEIGHT: 76 IN | SYSTOLIC BLOOD PRESSURE: 110 MMHG | WEIGHT: 163 LBS | HEART RATE: 82 BPM | DIASTOLIC BLOOD PRESSURE: 74 MMHG

## 2022-11-18 DIAGNOSIS — E11.9 TYPE 2 DIABETES MELLITUS WITHOUT COMPLICATION, WITHOUT LONG-TERM CURRENT USE OF INSULIN (HCC): Primary | ICD-10-CM

## 2022-11-18 DIAGNOSIS — J41.1 MUCOPURULENT CHRONIC BRONCHITIS (HCC): ICD-10-CM

## 2022-11-18 DIAGNOSIS — R35.1 BENIGN PROSTATIC HYPERPLASIA WITH NOCTURIA: ICD-10-CM

## 2022-11-18 DIAGNOSIS — L60.3 NAIL DYSTROPHY: ICD-10-CM

## 2022-11-18 DIAGNOSIS — N40.1 BENIGN PROSTATIC HYPERPLASIA WITH NOCTURIA: ICD-10-CM

## 2022-11-18 DIAGNOSIS — F17.200 TOBACCO DEPENDENCY: ICD-10-CM

## 2022-11-18 RX ORDER — HALOPERIDOL 2 MG/1
TABLET ORAL
COMMUNITY
Start: 2022-11-03

## 2022-11-18 RX ORDER — NICOTINE 21 MG/24HR
1 PATCH, TRANSDERMAL 24 HOURS TRANSDERMAL EVERY 24 HOURS
Qty: 30 PATCH | Refills: 0 | Status: SHIPPED | OUTPATIENT
Start: 2022-11-18

## 2022-11-18 RX ORDER — BUPROPION HYDROCHLORIDE 150 MG/1
TABLET ORAL
COMMUNITY
Start: 2022-10-26

## 2022-11-18 RX ORDER — POLYETHYLENE GLYCOL 3350 17 G
POWDER IN PACKET (EA) ORAL
Qty: 168 LOZENGE | Refills: 0 | Status: SHIPPED | OUTPATIENT
Start: 2022-11-18

## 2022-11-18 NOTE — PROGRESS NOTES
Assessment/Plan:    Mucopurulent chronic bronchitis ([facility] )  Patient has mild COPD on lung function test that was done this year and CT of the chest showed emphysema but no lung masses  He smokes more than a pack a day  Patient complains cough in the morning denies exertional dyspnea or wheezing  We discussed smoking cessation to prevent worsening of his emphysema, chronic bronchitis  He was open to the idea of trying Nicoderm patch and nicotine lozenges  I will reassess him in 3 months  He will continue p r n  Albuterol    Type 2 diabetes mellitus without complication, without long-term current use of insulin ([facility])    Lab Results   Component Value Date    HGBA1C 5 9 (H) 10/19/2022   Patient has type 2 diabetes  He is on metformin  Denies diarrhea  I will recheck his A1c in 3 months along with urine microalbumin    Nail dystrophy  Patient complains of bleeding from under the right 4th toe nail  He stubbed his toe into something  He does have dystrophic nails with elevation the nail plate and there is evidence of old blood under the right 4th toenail  I referred patient to podiatry    Benign prostatic hyperplasia with nocturia  Patient has BPH  He denies urinary retention  He feels that tamsulosin is making his urination is ear  Will continue tamsulosin       Diagnoses and all orders for this visit:    Type 2 diabetes mellitus without complication, without long-term current use of insulin ([facility] )  -     Ambulatory Referral to Ophthalmology; Future  -     Hemoglobin A1C; Future  -     Comprehensive metabolic panel; Future  -     Microalbumin / creatinine urine ratio; Future  -     CBC and Platelet; Future    Mucopurulent chronic bronchitis (HCC)    Benign prostatic hyperplasia with nocturia    Nail dystrophy  -     Ambulatory Referral to Podiatry;  Future    Tobacco dependency  -     nicotine (NICODERM CQ) 21 mg/24 hr TD 24 hr patch; Place 1 patch on the skin every 24 hours  -     nicotine (NICODERM CQ) 14 mg/24hr TD 24 hr patch; Place 1 patch on the skin every 24 hours  -     nicotine (NICODERM CQ) 7 mg/24hr TD 24 hr patch; Place 1 patch on the skin every 24 hours  -     nicotine polacrilex (COMMIT) 2 MG lozenge; 2mg every 1-2h for 6 weeks then every 2-4h for 3 weeks then every 4-8h for 3 weeks    Other orders  -     buPROPion (WELLBUTRIN XL) 150 mg 24 hr tablet  -     haloperidol (HALDOL) 2 mg tablet          Subjective:      Patient ID: Zhanna Combs is a 78 y o  male  HPI    Patient presents for follow-up of chronic conditions as detailed in the assessment and plan  The following portions of the patient's history were reviewed and updated as appropriate: current medications, past family history, past medical history, past social history, past surgical history and problem list     Review of Systems      Objective:    /74   Pulse 82   Ht 6' 4" (1 93 m)   Wt 73 9 kg (163 lb)   BMI 19 84 kg/m²      Physical Exam  Constitutional:       General: He is not in acute distress  Appearance: He is not toxic-appearing  HENT:      Head: Normocephalic  Eyes:      Conjunctiva/sclera: Conjunctivae normal    Cardiovascular:      Rate and Rhythm: Normal rate and regular rhythm  Heart sounds: No murmur heard  Pulmonary:      Effort: No respiratory distress  Breath sounds: No wheezing or rales  Skin:     Findings: No rash  Comments: As above   Neurological:      Mental Status: He is alert  Mental status is at baseline  Cranial Nerves: No cranial nerve deficit  Motor: No weakness  Psychiatric:         Mood and Affect: Mood normal              Joaquín Pugh MD   Falls Plan of Care: balance, strength, and gait training instructions were provided  Tobacco Cessation Counseling: Tobacco cessation counseling was provided  The patient is sincerely urged to quit consumption of tobacco  He is ready to quit tobacco  Medication options discussed

## 2022-11-18 NOTE — ASSESSMENT & PLAN NOTE
Patient has mild COPD on lung function test that was done this year and CT of the chest showed emphysema but no lung masses  He smokes more than a pack a day  Patient complains cough in the morning denies exertional dyspnea or wheezing  We discussed smoking cessation to prevent worsening of his emphysema, chronic bronchitis  He was open to the idea of trying Nicoderm patch and nicotine lozenges  I will reassess him in 3 months  He will continue p r n   Albuterol

## 2022-11-18 NOTE — ASSESSMENT & PLAN NOTE
Patient has BPH  He denies urinary retention  He feels that tamsulosin is making his urination is ear    Will continue tamsulosin

## 2022-11-18 NOTE — ASSESSMENT & PLAN NOTE
Lab Results   Component Value Date    HGBA1C 5 9 (H) 10/19/2022   Patient has type 2 diabetes  He is on metformin  Denies diarrhea    I will recheck his A1c in 3 months along with urine microalbumin

## 2022-12-27 ENCOUNTER — HOSPITAL ENCOUNTER (EMERGENCY)
Facility: HOSPITAL | Age: 67
Discharge: HOME | End: 2022-12-27
Attending: EMERGENCY MEDICINE

## 2022-12-27 ENCOUNTER — APPOINTMENT (OUTPATIENT)
Dept: RADIOLOGY | Facility: HOSPITAL | Age: 67
End: 2022-12-27

## 2022-12-27 VITALS
DIASTOLIC BLOOD PRESSURE: 89 MMHG | HEIGHT: 76 IN | WEIGHT: 164 LBS | OXYGEN SATURATION: 96 % | TEMPERATURE: 98 F | HEART RATE: 72 BPM | SYSTOLIC BLOOD PRESSURE: 140 MMHG | BODY MASS INDEX: 19.97 KG/M2 | RESPIRATION RATE: 18 BRPM

## 2022-12-27 DIAGNOSIS — K42.9 UMBILICAL HERNIA WITHOUT OBSTRUCTION AND WITHOUT GANGRENE: Primary | ICD-10-CM

## 2022-12-27 LAB
ALBUMIN SERPL BCP-MCNC: 3.9 G/DL (ref 3.5–5)
ALP SERPL-CCNC: 69 U/L (ref 46–116)
ALT SERPL W P-5'-P-CCNC: 32 U/L (ref 12–78)
ANION GAP SERPL CALCULATED.3IONS-SCNC: 7 MMOL/L (ref 4–13)
AST SERPL W P-5'-P-CCNC: 24 U/L (ref 5–45)
BASOPHILS # BLD AUTO: 0.08 THOUSANDS/ÂΜL (ref 0–0.1)
BASOPHILS NFR BLD AUTO: 1 % (ref 0–1)
BILIRUB SERPL-MCNC: 0.2 MG/DL (ref 0.2–1)
BILIRUB UR QL STRIP: NEGATIVE
BUN SERPL-MCNC: 11 MG/DL (ref 5–25)
CALCIUM SERPL-MCNC: 9.2 MG/DL (ref 8.3–10.1)
CARDIAC TROPONIN I PNL SERPL HS: 3 NG/L
CHLORIDE SERPL-SCNC: 102 MMOL/L (ref 96–108)
CLARITY UR: CLEAR
CO2 SERPL-SCNC: 29 MMOL/L (ref 21–32)
COLOR UR: YELLOW
CREAT SERPL-MCNC: 0.89 MG/DL (ref 0.6–1.3)
EOSINOPHIL # BLD AUTO: 0.5 THOUSAND/ÂΜL (ref 0–0.61)
EOSINOPHIL NFR BLD AUTO: 7 % (ref 0–6)
ERYTHROCYTE [DISTWIDTH] IN BLOOD BY AUTOMATED COUNT: 11.9 % (ref 11.6–15.1)
GFR SERPL CREATININE-BSD FRML MDRD: 89 ML/MIN/1.73SQ M
GLUCOSE SERPL-MCNC: 96 MG/DL (ref 65–140)
GLUCOSE UR STRIP-MCNC: NEGATIVE MG/DL
HCT VFR BLD AUTO: 41.8 % (ref 36.5–49.3)
HGB BLD-MCNC: 14.2 G/DL (ref 12–17)
HGB UR QL STRIP.AUTO: NEGATIVE
IMM GRANULOCYTES # BLD AUTO: 0.02 THOUSAND/UL (ref 0–0.2)
IMM GRANULOCYTES NFR BLD AUTO: 0 % (ref 0–2)
KETONES UR STRIP-MCNC: NEGATIVE MG/DL
LEUKOCYTE ESTERASE UR QL STRIP: NEGATIVE
LIPASE SERPL-CCNC: 103 U/L (ref 73–393)
LYMPHOCYTES # BLD AUTO: 2.6 THOUSANDS/ÂΜL (ref 0.6–4.47)
LYMPHOCYTES NFR BLD AUTO: 34 % (ref 14–44)
MCH RBC QN AUTO: 32.6 PG (ref 26.8–34.3)
MCHC RBC AUTO-ENTMCNC: 34 G/DL (ref 31.4–37.4)
MCV RBC AUTO: 96 FL (ref 82–98)
MONOCYTES # BLD AUTO: 0.65 THOUSAND/ÂΜL (ref 0.17–1.22)
MONOCYTES NFR BLD AUTO: 9 % (ref 4–12)
NEUTROPHILS # BLD AUTO: 3.81 THOUSANDS/ÂΜL (ref 1.85–7.62)
NEUTS SEG NFR BLD AUTO: 49 % (ref 43–75)
NITRITE UR QL STRIP: NEGATIVE
NRBC BLD AUTO-RTO: 0 /100 WBCS
PH UR STRIP.AUTO: 7 [PH]
PLATELET # BLD AUTO: 300 THOUSANDS/UL (ref 149–390)
PMV BLD AUTO: 10 FL (ref 8.9–12.7)
POTASSIUM SERPL-SCNC: 4.2 MMOL/L (ref 3.5–5.3)
PROT SERPL-MCNC: 7.5 G/DL (ref 6.4–8.4)
PROT UR STRIP-MCNC: NEGATIVE MG/DL
RBC # BLD AUTO: 4.36 MILLION/UL (ref 3.88–5.62)
SODIUM SERPL-SCNC: 138 MMOL/L (ref 135–147)
SP GR UR STRIP.AUTO: 1.01 (ref 1–1.03)
UROBILINOGEN UR STRIP-ACNC: <2 MG/DL
WBC # BLD AUTO: 7.66 THOUSAND/UL (ref 4.31–10.16)

## 2022-12-28 LAB
LEFT EYE DIABETIC RETINOPATHY: NORMAL
RIGHT EYE DIABETIC RETINOPATHY: NORMAL

## 2022-12-28 NOTE — ED PROVIDER NOTES
History  Chief Complaint   Patient presents with   • Vomiting     X2 weeks on and off, has umbilical hernia "wants surgery", denies fever/diarrhea, no vomiting today; from [facility]     79 yo M with h/o schizophrenia, COPD, BPH and HPL complains of umbilical hernia he has had fo a long time  He wants it taken out  Staff relate that patient's sister told them he has been vomiting a little lately, but not today  Staff have not seen any vomitus  Patient currently in no distress, denies pain and is hungry  Prior to Admission Medications   Prescriptions Last Dose Informant Patient Reported? Taking?    ASPIRIN 81 PO   Yes No   Sig: Take by mouth 1  daily    Cholecalciferol (VITAMIN D3) 5000 units CAPS   Yes No   Sig: Take by mouth 1 daily   Ibuprofen 200 MG CAPS   Yes No   Sig: Take by mouth 2 every 8 hrs prn pain   Multiple Vitamins-Minerals (MULTIVITAMIN WITH MINERALS) tablet   Yes No   Sig: Take 1 tablet by mouth daily   Naloxone HCl (NARCAN NA)   Yes No   Sig: into each nostril Administer nasally when suspected overdose   acetaminophen (TYLENOL) 500 mg tablet   No No   Sig: Take 1 tablet (500 mg total) by mouth every 8 (eight) hours as needed for mild pain or headaches   albuterol (PROVENTIL HFA,VENTOLIN HFA) 90 mcg/act inhaler   No No   Sig: Inhale 2 puffs 2 (two) times a day   b complex vitamins capsule   No No   Sig: Take 1 capsule by mouth daily   benztropine (COGENTIN) 1 mg tablet   Yes No   Si in the am   buPROPion (WELLBUTRIN XL) 150 mg 24 hr tablet   Yes No   buPROPion (WELLBUTRIN XL) 300 mg 24 hr tablet   Yes No   Sig: Take 300 mg by mouth daily   folic acid (FOLVITE) 1 mg tablet   No No   Sig: Take 1 tablet (1 mg total) by mouth daily   haloperidol (HALDOL) 10 mg tablet   Yes No   Si hs   haloperidol (HALDOL) 2 mg tablet   Yes No   haloperidol (HALDOL) 5 mg tablet   Yes No   Si in the am   lurasidone (LATUDA) 120 mg tablet   Yes No   Sig: Take 20 mg by mouth daily with dinner metFORMIN (GLUCOPHAGE) 500 mg tablet   Yes No   Sig: Take 500 mg by mouth 2 (two) times a day with meals   nicotine (NICODERM CQ) 14 mg/24hr TD 24 hr patch   No No   Sig: Place 1 patch on the skin every 24 hours   nicotine (NICODERM CQ) 21 mg/24 hr TD 24 hr patch   No No   Sig: Place 1 patch on the skin every 24 hours   nicotine (NICODERM CQ) 7 mg/24hr TD 24 hr patch   No No   Sig: Place 1 patch on the skin every 24 hours   nicotine polacrilex (COMMIT) 2 MG lozenge   No No   Simg every 1-2h for 6 weeks then every 2-4h for 3 weeks then every 4-8h for 3 weeks   nicotine polacrilex (NICORETTE) 2 mg gum   No No   Sig: Chew 1 each (2 mg total)  as needed in the morning and 1 each (2 mg total) as needed in the evening for smoking cessation     nicotine polacrilex (Nicorette) 2 mg gum   No No   Sig: Chew 1 each (2 mg total) as needed for smoking cessation   omega-3-acid ethyl esters (LOVAZA) 1 g capsule   Yes No   Sig: Take 2 g by mouth daily   polyethylene glycol (GLYCOLAX) 17 GM/SCOOP powder   No No   Sig: Take 17 g by mouth daily Patient is to take 17 grams at  1 pm and 8 pm two days prior to colonoscopy for two day extended prep    pravastatin (PRAVACHOL) 40 mg tablet   No No   Sig: Take 1 tablet (40 mg total) by mouth daily at bedtime   sertraline (ZOLOFT) 100 mg tablet   Yes No   Si daily   tamsulosin (FLOMAX) 0 4 mg   No No   Sig: Take 1 capsule (0 4 mg total) by mouth daily with dinner      Facility-Administered Medications: None       Past Medical History:   Diagnosis Date   • Atopic rhinitis    • BPH with urinary obstruction    • COPD (chronic obstructive pulmonary disease) ([facility])    • Hyperlipidemia    • Incomplete bladder emptying    • Schizophrenia (HCC)        Past Surgical History:   Procedure Laterality Date   • RIB FRACTURE SURGERY         Family History   Problem Relation Age of Onset   • Dementia Mother    • Heart attack Father    • Substance Abuse Sister         cigarettes   • Substance Abuse Brother    • Alcohol abuse Brother      I have reviewed and agree with the history as documented  E-Cigarette/Vaping   • E-Cigarette Use Never User      E-Cigarette/Vaping Substances   • Nicotine No    • THC No    • CBD No    • Flavoring No    • Other No    • Unknown No      Social History     Tobacco Use   • Smoking status: Every Day     Packs/day: 1 00     Years: 45 00     Pack years: 45 00     Types: Cigarettes   • Smokeless tobacco: Never   Vaping Use   • Vaping Use: Never used   Substance Use Topics   • Alcohol use: No     Comment: hx  of hard liquor, regular use, quit at age 33    • Drug use: No       Review of Systems   Constitutional: Negative for fever  Respiratory: Positive for cough ( chronic  No change  )  Cardiovascular: Negative for chest pain and palpitations  Gastrointestinal: Positive for vomiting  Negative for abdominal distention, abdominal pain, constipation and diarrhea  All other systems reviewed and are negative  Physical Exam  Physical Exam  Vitals and nursing note reviewed  Constitutional:       General: He is not in acute distress  Appearance: Normal appearance  He is well-developed  He is not ill-appearing or diaphoretic  HENT:      Head: Normocephalic and atraumatic  Right Ear: External ear normal       Left Ear: External ear normal       Nose: Nose normal       Mouth/Throat:      Mouth: Mucous membranes are moist       Pharynx: Oropharynx is clear  Eyes:      General: No scleral icterus  Conjunctiva/sclera: Conjunctivae normal       Pupils: Pupils are equal, round, and reactive to light  Neck:      Vascular: No JVD  Cardiovascular:      Rate and Rhythm: Normal rate and regular rhythm  Pulses: Normal pulses  Heart sounds: Normal heart sounds  No murmur heard  Pulmonary:      Effort: Pulmonary effort is normal       Breath sounds: Normal breath sounds  Abdominal:      General: Bowel sounds are normal  There is no distension  Palpations: Abdomen is soft  There is no mass  Tenderness: There is no abdominal tenderness  There is no right CVA tenderness, left CVA tenderness, guarding or rebound  Hernia: A hernia ( umbilical, easily reducable) is present  Musculoskeletal:         General: No tenderness  Normal range of motion  Cervical back: Normal range of motion and neck supple  Right lower leg: No edema  Left lower leg: No edema  Lymphadenopathy:      Cervical: No cervical adenopathy  Skin:     General: Skin is warm and dry  Capillary Refill: Capillary refill takes less than 2 seconds  Findings: No rash  Neurological:      General: No focal deficit present  Mental Status: He is alert and oriented to person, place, and time  Cranial Nerves: No cranial nerve deficit  Sensory: No sensory deficit  Motor: No weakness  Coordination: Coordination normal       Gait: Gait normal       Deep Tendon Reflexes: Reflexes are normal and symmetric     Psychiatric:         Mood and Affect: Mood normal          Behavior: Behavior normal          Vital Signs  ED Triage Vitals [12/27/22 1858]   Temperature Pulse Respirations Blood Pressure SpO2   98 °F (36 7 °C) 72 18 140/89 96 %      Temp Source Heart Rate Source Patient Position - Orthostatic VS BP Location FiO2 (%)   Temporal Monitor Sitting Left arm --      Pain Score       No Pain           Vitals:    12/27/22 1858   BP: 140/89   Pulse: 72   Patient Position - Orthostatic VS: Sitting         Visual Acuity      ED Medications  Medications - No data to display    Diagnostic Studies  Results Reviewed     Procedure Component Value Units Date/Time    UA w Reflex to Microscopic w Reflex to Culture [[id number]] Collected: 12/27/22 1955    Lab Status: Final result Specimen: Urine, Clean Catch Updated: 12/27/22 2036     Color, UA Yellow     Clarity, UA Clear     Specific Gravity, UA 1 015     pH, UA 7 0     Leukocytes, UA Negative     Nitrite, UA Negative     Protein, UA Negative mg/dl      Glucose, UA Negative mg/dl      Ketones, UA Negative mg/dl      Urobilinogen, UA <2 0 mg/dl      Bilirubin, UA Negative     Occult Blood, UA Negative    Comprehensive metabolic panel [[id number]] Collected: 12/27/22 1908    Lab Status: Final result Specimen: Blood from Arm, Left Updated: 12/27/22 2026     Sodium 138 mmol/L      Potassium 4 2 mmol/L      Chloride 102 mmol/L      CO2 29 mmol/L      ANION GAP 7 mmol/L      BUN 11 mg/dL      Creatinine 0 89 mg/dL      Glucose 96 mg/dL      Calcium 9 2 mg/dL      AST 24 U/L      ALT 32 U/L      Alkaline Phosphatase 69 U/L      Total Protein 7 5 g/dL      Albumin 3 9 g/dL      Total Bilirubin 0 20 mg/dL      eGFR 89 ml/min/1 73sq m     Narrative:      [place] guidelines for Chronic Kidney Disease (CKD):   •  Stage 1 with normal or high GFR (GFR > 90 mL/min/1 73 square meters)  •  Stage 2 Mild CKD (GFR = 60-89 mL/min/1 73 square meters)  •  Stage 3A Moderate CKD (GFR = 45-59 mL/min/1 73 square meters)  •  Stage 3B Moderate CKD (GFR = 30-44 mL/min/1 73 square meters)  •  Stage 4 Severe CKD (GFR = 15-29 mL/min/1 73 square meters)  •  Stage 5 End Stage CKD (GFR <15 mL/min/1 73 square meters)  Note: GFR calculation is accurate only with a steady state creatinine    Lipase [[id number]]  (Normal) Collected: 12/27/22 1908    Lab Status: Final result Specimen: Blood from Arm, Left Updated: 12/27/22 2026     Lipase 103 u/L     HS Troponin I 4hr [[id number]]     Lab Status: No result Specimen: Blood     HS Troponin I 2hr [[id number]]     Lab Status: No result Specimen: Blood     HS Troponin 0hr (reflex protocol) [[id number]]  (Normal) Collected: 12/27/22 1908    Lab Status: Final result Specimen: Blood from Arm, Left Updated: 12/27/22 1942     hs TnI 0hr 3 ng/L     CBC and differential [[id number]]  (Abnormal) Collected: 12/27/22 1908    Lab Status: Final result Specimen: Blood from Arm, Left Updated: 12/27/22 1918 WBC 7 66 Thousand/uL      RBC 4 36 Million/uL      Hemoglobin 14 2 g/dL      Hematocrit 41 8 %      MCV 96 fL      MCH 32 6 pg      MCHC 34 0 g/dL      RDW 11 9 %      MPV 10 0 fL      Platelets 840 Thousands/uL      nRBC 0 /100 WBCs      Neutrophils Relative 49 %      Immat GRANS % 0 %      Lymphocytes Relative 34 %      Monocytes Relative 9 %      Eosinophils Relative 7 %      Basophils Relative 1 %      Neutrophils Absolute 3 81 Thousands/µL      Immature Grans Absolute 0 02 Thousand/uL      Lymphocytes Absolute 2 60 Thousands/µL      Monocytes Absolute 0 65 Thousand/µL      Eosinophils Absolute 0 50 Thousand/µL      Basophils Absolute 0 08 Thousands/µL                  XR chest pa & lateral   ED Interpretation by Michael Pinto DO (12/27 2238)   unremarkable                 Procedures  ECG 12 Lead Documentation Only    Date/Time: 12/27/2022 9:11 PM  Performed by: Michael Pinto DO  Authorized by: Michael Pinto DO     ECG reviewed by me, the ED Provider: yes    Patient location:  ED  Previous ECG:     Previous ECG:  Unavailable    Comparison to cardiac monitor: Yes    Interpretation:     Interpretation: normal               ED Course                               SBIRT 22yo+    Flowsheet Row Most Recent Value   SBIRT (23 yo +)    In order to provide better care to our patients, we are screening all of our patients for alcohol and drug use  Would it be okay to ask you these screening questions? Yes Filed at: 12/27/2022 2247   Initial Alcohol Screen: US AUDIT-C     1  How often do you have a drink containing alcohol? 0 Filed at: 12/27/2022 2247   2  How many drinks containing alcohol do you have on a typical day you are drinking? 0 Filed at: 12/27/2022 2247   3a  Male UNDER 65: How often do you have five or more drinks on one occasion? 0 Filed at: 12/27/2022 2247   3b  FEMALE Any Age, or MALE 65+: How often do you have 4 or more drinks on one occassion?  0 Filed at: 12/27/2022 2247   Audit-C Score 0 Filed at: 12/27/2022 2247   DINO: How many times in the past year have you    Used an illegal drug or used a prescription medication for non-medical reasons? Never Filed at: 12/27/2022 2247                    [facility]  Number of Diagnoses or Management Options  Umbilical hernia without obstruction and without gangrene: new and does not require workup  Diagnosis management comments: 79 yo male with schizophrenia c/o vomiting  No vomitus seen by staff  Denies pain  Exam shows easily reducible umbilical hernia  Remainder of exam unremarkable  Labs and CXR essentially normal  Stable for general surgery office f/u  Amount and/or Complexity of Data Reviewed  Clinical lab tests: reviewed  Tests in the radiology section of CPT®: reviewed  Obtain history from someone other than the patient: yes  Review and summarize past medical records: yes  Independent visualization of images, tracings, or specimens: yes        Disposition  Final diagnoses:   Umbilical hernia without obstruction and without gangrene     Time reflects when diagnosis was documented in both MDM as applicable and the Disposition within this note     Time User Action Codes Description Comment    12/27/2022 10:44 PM Pantera Fabian Add [K89 6] Umbilical hernia without obstruction and without gangrene       ED Disposition     ED Disposition   Discharge    Condition   Stable    Date/Time   Tue Dec 27, 2022 10:44 PM    Comment   Hien Salazar discharge to home/self care                 Follow-up Information     Follow up With Specialties Details Why Contact Info    Nat Sung MD General Surgery Schedule an appointment as soon as possible for a visit   [address]            Discharge Medication List as of 12/27/2022 10:45 PM      CONTINUE these medications which have NOT CHANGED    Details   acetaminophen (TYLENOL) 500 mg tablet Take 1 tablet (500 mg total) by mouth every 8 (eight) hours as needed for mild pain or headaches, Starting Thu 12/9/2021, Normal      albuterol (PROVENTIL HFA,VENTOLIN HFA) 90 mcg/act inhaler Inhale 2 puffs 2 (two) times a day, Starting Mon 8/8/2022, Normal      ASPIRIN 81 PO Take by mouth 1  daily , Historical Med      b complex vitamins capsule Take 1 capsule by mouth daily, Starting Wed 3/9/2022, Normal      benztropine (COGENTIN) 1 mg tablet 1 in the am, Starting Wed 9/28/2022, Historical Med      !! buPROPion (WELLBUTRIN XL) 150 mg 24 hr tablet Starting Wed 10/26/2022, Historical Med      !! buPROPion (WELLBUTRIN XL) 300 mg 24 hr tablet Take 300 mg by mouth daily, Historical Med      Cholecalciferol (VITAMIN D3) 5000 units CAPS Take by mouth 1 daily, Historical Med      folic acid (FOLVITE) 1 mg tablet Take 1 tablet (1 mg total) by mouth daily, Starting Tue 2/5/2019, Print      !! haloperidol (HALDOL) 10 mg tablet 1 hs, Starting Wed 9/28/2022, Historical Med      !! haloperidol (HALDOL) 2 mg tablet Starting Thu 11/3/2022, Historical Med      !! haloperidol (HALDOL) 5 mg tablet 1 in the am, Historical Med      Ibuprofen 200 MG CAPS Take by mouth 2 every 8 hrs prn pain, Historical Med      lurasidone (LATUDA) 120 mg tablet Take 20 mg by mouth daily with dinner, Historical Med      metFORMIN (GLUCOPHAGE) 500 mg tablet Take 500 mg by mouth 2 (two) times a day with meals, Historical Med      Multiple Vitamins-Minerals (MULTIVITAMIN WITH MINERALS) tablet Take 1 tablet by mouth daily, Historical Med      Naloxone HCl (NARCAN NA) into each nostril Administer nasally when suspected overdose, Historical Med      nicotine (NICODERM CQ) 14 mg/24hr TD 24 hr patch Place 1 patch on the skin every 24 hours, Starting Fri 11/18/2022, Normal      nicotine (NICODERM CQ) 21 mg/24 hr TD 24 hr patch Place 1 patch on the skin every 24 hours, Starting Fri 11/18/2022, Normal      nicotine (NICODERM CQ) 7 mg/24hr TD 24 hr patch Place 1 patch on the skin every 24 hours, Starting Fri 11/18/2022, Normal nicotine polacrilex (COMMIT) 2 MG lozenge 2mg every 1-2h for 6 weeks then every 2-4h for 3 weeks then every 4-8h for 3 weeks, Normal      !! nicotine polacrilex (NICORETTE) 2 mg gum Chew 1 each (2 mg total)  as needed in the morning and 1 each (2 mg total) as needed in the evening for smoking cessation  , Starting Thu 5/12/2022, Normal      !! nicotine polacrilex (Nicorette) 2 mg gum Chew 1 each (2 mg total) as needed for smoking cessation, Starting Fri 10/7/2022, Print      omega-3-acid ethyl esters (LOVAZA) 1 g capsule Take 2 g by mouth daily, Historical Med      polyethylene glycol (GLYCOLAX) 17 GM/SCOOP powder Take 17 g by mouth daily Patient is to take 17 grams at  1 pm and 8 pm two days prior to colonoscopy for two day extended prep , Starting Mon 6/20/2022, Normal      pravastatin (PRAVACHOL) 40 mg tablet Take 1 tablet (40 mg total) by mouth daily at bedtime, Starting Mon 10/17/2022, Normal      sertraline (ZOLOFT) 100 mg tablet 1 daily, Historical Med      tamsulosin (FLOMAX) 0 4 mg Take 1 capsule (0 4 mg total) by mouth daily with dinner, Starting Tue 4/7/2020, Normal       !! - Potential duplicate medications found  Please discuss with provider  No discharge procedures on file      PDMP Review     None          ED Provider  Electronically Signed by           Danae Chaudhary DO  12/27/22 5024

## 2022-12-28 NOTE — DISCHARGE INSTRUCTIONS
Keep well hydrated  Eat frequent small meals  Continue present medications  Follow up with the general surgeon listed below

## 2022-12-29 LAB
ATRIAL RATE: 74 BPM
P AXIS: 81 DEGREES
PR INTERVAL: 166 MS
QRS AXIS: 78 DEGREES
QRSD INTERVAL: 82 MS
QT INTERVAL: 378 MS
QTC INTERVAL: 419 MS
T WAVE AXIS: 65 DEGREES
VENTRICULAR RATE: 74 BPM

## 2023-01-10 ENCOUNTER — OFFICE VISIT (OUTPATIENT)
Dept: FAMILY MEDICINE CLINIC | Facility: HOSPITAL | Age: 68
End: 2023-01-10

## 2023-01-10 VITALS
HEIGHT: 76 IN | RESPIRATION RATE: 16 BRPM | OXYGEN SATURATION: 98 % | HEART RATE: 77 BPM | DIASTOLIC BLOOD PRESSURE: 78 MMHG | BODY MASS INDEX: 19.73 KG/M2 | WEIGHT: 162 LBS | SYSTOLIC BLOOD PRESSURE: 118 MMHG

## 2023-01-10 DIAGNOSIS — K42.9 UMBILICAL HERNIA WITHOUT OBSTRUCTION AND WITHOUT GANGRENE: Primary | ICD-10-CM

## 2023-01-10 DIAGNOSIS — J41.1 MUCOPURULENT CHRONIC BRONCHITIS (HCC): ICD-10-CM

## 2023-01-10 DIAGNOSIS — M19.011 PRIMARY OSTEOARTHRITIS OF RIGHT SHOULDER: ICD-10-CM

## 2023-01-10 DIAGNOSIS — R11.2 NAUSEA AND VOMITING, UNSPECIFIED VOMITING TYPE: ICD-10-CM

## 2023-01-10 PROBLEM — M77.8 TENDINITIS OF RIGHT SHOULDER: Status: RESOLVED | Noted: 2022-10-07 | Resolved: 2023-01-10

## 2023-01-10 RX ORDER — MELOXICAM 7.5 MG/1
7.5 TABLET ORAL DAILY
COMMUNITY
End: 2023-01-10

## 2023-01-10 RX ORDER — OMEPRAZOLE 20 MG/1
20 CAPSULE, DELAYED RELEASE ORAL EVERY MORNING
Qty: 30 CAPSULE | Refills: 1 | Status: SHIPPED | OUTPATIENT
Start: 2023-01-10 | End: 2023-01-20

## 2023-01-10 NOTE — ASSESSMENT & PLAN NOTE
She has umbilical hernia which is not reducible today but not tender either  Patient will see hernia surgery on July 16  He will need optical hernia repair

## 2023-01-10 NOTE — PROGRESS NOTES
Assessment/Plan:    Umbilical hernia without obstruction and without gangrene  She has umbilical hernia which is not reducible today but not tender either  Patient will see hernia surgery on July 16  He will need optical hernia repair  Nausea and vomiting  Patient complains of intermittent nausea and vomiting, 4 pound weight loss since November  Spoke with patient's nurse at [facility] 8 told me that patient had not reported vomiting to the staff the last 2 weeks  Patient's sister though told me that patient was complaining of recurrent vomiting to them  His CBC CMP and urinalysis were normal on December 27 on review of records from the ER visit  His abdomen is soft and nontender with the nonreducible local hernia present  Patient denies dysphagia, has occasional heartburns  Was taking meloxicam    I discontinued meloxicam   We will start patient on omeprazole  Referred patient to GI for possible EGD  I will get an ultrasound of the gallbladder to rule out cholelithiasis patient was also complaining of intermittent right shoulder pain  Primary osteoarthritis of right shoulder  Patient reports that his right shoulder pain is getting better its more worse with movement such as reaching up with his right arm  Examination he has crepitus range of motion and x-ray showed mild osteoarthritis last year  I told patient sister that his shoulder pain is mostly due to osteoarthritis and less likely to be pain referred from intra-abdominal causes such as cholelithiasis  Discontinue meloxicam could cause gastric irritation    Mucopurulent chronic bronchitis ([address] )  Patient has mild chronic COPD  His breathing is at baseline  He has no wheezing today on exam   Sinew albuterol as needed  Unfortunately he still smokes         Diagnoses and all orders for this visit:    Umbilical hernia without obstruction and without gangrene    Nausea and vomiting, unspecified vomiting type  -     US right upper quadrant; Future  -     Ambulatory Referral to Gastroenterology; Future  -     omeprazole (PriLOSEC) 20 mg delayed release capsule; Take 1 capsule (20 mg total) by mouth every morning for 10 days    Mucopurulent chronic bronchitis (HCC)    Primary osteoarthritis of right shoulder    Other orders  -     Discontinue: meloxicam (MOBIC) 7 5 mg tablet; Take 7 5 mg by mouth daily          Subjective:      Patient ID: Nelly Calzada is a 78 y o  male  I discussed the case with patient's nurse Mary Anne Nguyen at [address] [place] [address] the phone as well as patient's nurse Lakeshia Stephenson on the phone they      Patient presents for follow-up of chronic conditions as detailed in the assessment and plan  The following portions of the patient's history were reviewed and updated as appropriate: current medications, past family history, past medical history, past social history, past surgical history and problem list     Review of Systems      Objective:    /78   Pulse 77   Resp 16   Ht 6' 4" (1 93 m)   Wt 73 5 kg (162 lb)   SpO2 98%   BMI 19 72 kg/m²      Physical Exam  Constitutional:       General: He is not in acute distress  Appearance: He is not toxic-appearing  HENT:      Head: Normocephalic  Mouth/Throat:      Mouth: Mucous membranes are moist    Eyes:      Conjunctiva/sclera: Conjunctivae normal    Cardiovascular:      Rate and Rhythm: Normal rate and regular rhythm  Heart sounds: No murmur heard  Pulmonary:      Effort: No respiratory distress  Breath sounds: No wheezing or rales  Abdominal:      General: Bowel sounds are normal  There is no distension  Palpations: Abdomen is soft  Tenderness: There is no abdominal tenderness  There is no guarding  Hernia: A hernia is present  Skin:     General: Skin is warm and dry  Neurological:      Mental Status: He is alert  Mental status is at baseline  Cranial Nerves: No cranial nerve deficit  Motor: No weakness        Gait: Gait normal              Elliot Peña MD

## 2023-01-10 NOTE — ASSESSMENT & PLAN NOTE
Patient has mild chronic COPD  His breathing is at baseline  He has no wheezing today on exam   Sinew albuterol as needed  Unfortunately he still smokes

## 2023-01-10 NOTE — ASSESSMENT & PLAN NOTE
Patient reports that his right shoulder pain is getting better its more worse with movement such as reaching up with his right arm  Examination he has crepitus range of motion and x-ray showed mild osteoarthritis last year  I told patient sister that his shoulder pain is mostly due to osteoarthritis and less likely to be pain referred from intra-abdominal causes such as cholelithiasis      Discontinue meloxicam could cause gastric irritation

## 2023-01-10 NOTE — ASSESSMENT & PLAN NOTE
Patient complains of intermittent nausea and vomiting, 4 pound weight loss since November  Spoke with patient's nurse at [facility] 8 told me that patient had not reported vomiting to the staff the last 2 weeks  Patient's sister though told me that patient was complaining of recurrent vomiting to them  His CBC CMP and urinalysis were normal on December 27 on review of records from the ER visit  His abdomen is soft and nontender with the nonreducible local hernia present  Patient denies dysphagia, has occasional heartburns  Was taking meloxicam    I discontinued meloxicam   We will start patient on omeprazole  Referred patient to GI for possible EGD  I will get an ultrasound of the gallbladder to rule out cholelithiasis patient was also complaining of intermittent right shoulder pain

## 2023-01-16 ENCOUNTER — CONSULT (OUTPATIENT)
Dept: SURGERY | Facility: HOSPITAL | Age: 68
End: 2023-01-16

## 2023-01-16 VITALS
DIASTOLIC BLOOD PRESSURE: 86 MMHG | RESPIRATION RATE: 16 BRPM | BODY MASS INDEX: 20.68 KG/M2 | HEIGHT: 76 IN | TEMPERATURE: 97.1 F | SYSTOLIC BLOOD PRESSURE: 131 MMHG | WEIGHT: 169.8 LBS | HEART RATE: 70 BPM

## 2023-01-16 DIAGNOSIS — K42.9 UMBILICAL HERNIA WITHOUT OBSTRUCTION AND WITHOUT GANGRENE: Primary | ICD-10-CM

## 2023-01-20 ENCOUNTER — HOSPITAL ENCOUNTER (OUTPATIENT)
Dept: ULTRASOUND IMAGING | Facility: HOSPITAL | Age: 68
End: 2023-01-20
Attending: INTERNAL MEDICINE

## 2023-01-20 DIAGNOSIS — R11.2 NAUSEA AND VOMITING, UNSPECIFIED VOMITING TYPE: ICD-10-CM

## 2023-01-20 NOTE — PROGRESS NOTES
Assessment/Plan:   Eunice Castro is a 78 y o male who is here for Hernia (Umbilical hernia  New patient evaluated at [address] [place] [address] ER 12/27/22 for c/o vomiting  States " I want my hernia fixed "  Patient states he has had his umbilical hernia for years  Does have some pain at times  Seen and evaluated by surgeon  Patient not interested in having surgery at this time  To follow up prn )    Plan: Umbilical hernia - discussed operative vs conservative mgt, surgical approaches, risks and benefits and patient has opted for conservative management and does not want surgical repair  I did have discussion that if he has pain or hernia becomes non reducible he should return to discuss surgery      Preoperative Clearance: None    HPI:  Eunice Castro is a 78 y o male who was referred for evaluation of Hernia (Umbilical hernia  New patient evaluated at [facility] ER 12/27/22 for c/o vomiting  States " I want my hernia fixed "  Patient states he has had his umbilical hernia for years  Does have some pain at times  Seen and evaluated by surgeon  Patient not interested in having surgery at this time  To follow up prn )    Currently no pain recent ER visit for vomiting which was unrelated to hernia   ROS:  General ROS: negative  negative for - chills, fatigue, fever or night sweats, weight loss  Respiratory ROS: no cough, shortness of breath, or wheezing  Cardiovascular ROS: no chest pain or dyspnea on exertion  Genito-Urinary ROS: no dysuria, trouble voiding, or hematuria  Musculoskeletal ROS: negative for - gait disturbance, joint pain or muscle pain  Neurological ROS: no TIA or stroke symptoms  umbilcial lump    [unfilled]  Patient has no known allergies      Current Outpatient Medications:   •  acetaminophen (TYLENOL) 500 mg tablet, Take 1 tablet (500 mg total) by mouth every 8 (eight) hours as needed for mild pain or headaches, Disp: 90 tablet, Rfl: 1  •  albuterol (PROVENTIL HFA,VENTOLIN HFA) 90 mcg/act inhaler, Inhale 2 puffs 2 (two) times a day, Disp: 8 g, Rfl: 5  •  ASPIRIN 81 PO, Take by mouth 1  daily , Disp: , Rfl:   •  b complex vitamins capsule, Take 1 capsule by mouth daily, Disp: 30 capsule, Rfl: 6  •  benztropine (COGENTIN) 1 mg tablet, 1 in the am, Disp: , Rfl:   •  buPROPion (WELLBUTRIN XL) 150 mg 24 hr tablet, 1 DAILY, Disp: , Rfl:   •  buPROPion (WELLBUTRIN XL) 300 mg 24 hr tablet, Take 300 mg by mouth daily, Disp: , Rfl:   •  Cholecalciferol (VITAMIN D3) 5000 units CAPS, Take by mouth 1 daily, Disp: , Rfl:   •  folic acid (FOLVITE) 1 mg tablet, Take 1 tablet (1 mg total) by mouth daily, Disp: 30 tablet, Rfl: 6  •  haloperidol (HALDOL) 10 mg tablet, 1 hs, Disp: , Rfl:   •  haloperidol (HALDOL) 2 mg tablet, 2 daily, Disp: , Rfl:   •  haloperidol (HALDOL) 5 mg tablet, 1 in the am, Disp: , Rfl:   •  lurasidone (LATUDA) 120 mg tablet, Take 20 mg by mouth daily with dinner, Disp: , Rfl:   •  metFORMIN (GLUCOPHAGE) 500 mg tablet, Take 500 mg by mouth 2 (two) times a day with meals, Disp: , Rfl:   •  Multiple Vitamins-Minerals (MULTIVITAMIN WITH MINERALS) tablet, Take 1 tablet by mouth daily, Disp: , Rfl:   •  Naloxone HCl (NARCAN NA), into each nostril Administer nasally when suspected overdose, Disp: , Rfl:   •  nicotine polacrilex (NICORETTE) 2 mg gum, Chew 1 each (2 mg total)  as needed in the morning and 1 each (2 mg total) as needed in the evening for smoking cessation  , Disp: 100 each, Rfl: 0  •  nicotine polacrilex (Nicorette) 2 mg gum, Chew 1 each (2 mg total) as needed for smoking cessation, Disp: 100 each, Rfl: 0  •  omeprazole (PriLOSEC) 20 mg delayed release capsule, Take 1 capsule (20 mg total) by mouth every morning for 10 days, Disp: 30 capsule, Rfl: 1  •  polyethylene glycol (GLYCOLAX) 17 GM/SCOOP powder, Take 17 g by mouth daily Patient is to take 17 grams at  1 pm and 8 pm two days prior to colonoscopy for two day extended prep , Disp: 34 g, Rfl: 0  •  pravastatin (PRAVACHOL) 40 mg tablet, Take 1 tablet (40 mg total) by mouth daily at bedtime, Disp: 30 tablet, Rfl: 5  •  sertraline (ZOLOFT) 100 mg tablet, 1 daily, Disp: , Rfl:   •  tamsulosin (FLOMAX) 0 4 mg, Take 1 capsule (0 4 mg total) by mouth daily with dinner, Disp: 90 capsule, Rfl: 3  •  nicotine (NICODERM CQ) 14 mg/24hr TD 24 hr patch, Place 1 patch on the skin every 24 hours (Patient not taking: Reported on 1/10/2023), Disp: 30 patch, Rfl: 0  •  nicotine (NICODERM CQ) 21 mg/24 hr TD 24 hr patch, Place 1 patch on the skin every 24 hours (Patient not taking: Reported on 1/10/2023), Disp: 30 patch, Rfl: 0  •  nicotine (NICODERM CQ) 7 mg/24hr TD 24 hr patch, Place 1 patch on the skin every 24 hours (Patient not taking: Reported on 1/10/2023), Disp: 30 patch, Rfl: 0  •  nicotine polacrilex (COMMIT) 2 MG lozenge, 2mg every 1-2h for 6 weeks then every 2-4h for 3 weeks then every 4-8h for 3 weeks (Patient not taking: Reported on 1/10/2023), Disp: 168 lozenge, Rfl: 0  Past Medical History:   Diagnosis Date   • Atopic rhinitis    • BPH with urinary obstruction    • COPD (chronic obstructive pulmonary disease) (HCC)    • Hyperlipidemia    • Incomplete bladder emptying    • Schizophrenia (HCC)      Past Surgical History:   Procedure Laterality Date   • RIB FRACTURE SURGERY       Family History   Problem Relation Age of Onset   • Dementia Mother    • Heart attack Father    • Substance Abuse Sister         cigarettes   • Substance Abuse Brother    • Alcohol abuse Brother       reports that he has been smoking cigarettes  He has a 45 00 pack-year smoking history  He has never used smokeless tobacco  He reports that he does not drink alcohol and does not use drugs  Labs:   Lab Results   Component Value Date    WBC 7 66 12/27/2022    HGB 14 2 12/27/2022     12/27/2022     Lab Results   Component Value Date    ALT 32 12/27/2022    ALT 30 10/17/2022    AST 24 12/27/2022    AST 18 10/17/2022     This SmartLink has not been configured with any valid records         PHYSICAL EXAM  General Appearance:    Alert, cooperative, no distress,    Head:    Normocephalic without obvious abnormality   Eyes:    PERRL, conjunctiva/corneas clear, EOM's intact        Neck:   Supple, no adenopathy, no JVD   Back:     Symmetric, no spinal or CVA tenderness   Lungs:     Clear to auscultation bilaterally, no wheezing or rhonchi   Heart:    Regular rate and rhythm, S1 and S2 normal, no murmur   Abdomen:     Soft +BS ND NT reducible umbilical hernia   Extremities:   Extremities normal  No clubbing, cyanosis or edema   Psych:   Normal Affect, AOx3  Neurologic:  Skin:   CNII-XII intact  Strength symmetric, speech intact    Warm, dry, intact, no visible rashes or lesions         Physical Exam              Some portions of this record may have been generated with voice recognition software  There may be translation, syntax,  or grammatical errors  Occasional wrong word or "sound-a-like" substitutions may have occurred due to the inherent limitations of the voice recognition software  Read the chart carefully and recognize, using context, where substitutions may have occurred  If you have any questions, please contact the dictating provider for clarification or correction, as needed  This encounter has been coded by a non-certified coder

## 2023-01-26 NOTE — RESULT ENCOUNTER NOTE
Call patient: Ultrasound of the abdomen shows gallstones without signs of obstruction    Since he had nausea vomiting and right shoulder discomfort I would like to refer patient, if its okay with him, to surgery for evaluation of cholecystectomy

## 2023-02-15 ENCOUNTER — TELEPHONE (OUTPATIENT)
Dept: GASTROENTEROLOGY | Facility: CLINIC | Age: 68
End: 2023-02-15

## 2023-02-15 ENCOUNTER — CONSULT (OUTPATIENT)
Dept: GASTROENTEROLOGY | Facility: CLINIC | Age: 68
End: 2023-02-15

## 2023-02-15 ENCOUNTER — PREP FOR PROCEDURE (OUTPATIENT)
Dept: GASTROENTEROLOGY | Facility: CLINIC | Age: 68
End: 2023-02-15

## 2023-02-15 VITALS
HEART RATE: 77 BPM | TEMPERATURE: 98.9 F | SYSTOLIC BLOOD PRESSURE: 129 MMHG | WEIGHT: 162 LBS | DIASTOLIC BLOOD PRESSURE: 88 MMHG | HEIGHT: 76 IN | BODY MASS INDEX: 19.73 KG/M2

## 2023-02-15 DIAGNOSIS — R11.2 NAUSEA AND VOMITING, UNSPECIFIED VOMITING TYPE: Primary | ICD-10-CM

## 2023-02-15 DIAGNOSIS — K63.5 POLYP OF COLON, UNSPECIFIED PART OF COLON, UNSPECIFIED TYPE: ICD-10-CM

## 2023-02-15 DIAGNOSIS — K42.9 UMBILICAL HERNIA WITHOUT OBSTRUCTION AND WITHOUT GANGRENE: Primary | ICD-10-CM

## 2023-02-15 DIAGNOSIS — R11.2 NAUSEA AND VOMITING, UNSPECIFIED VOMITING TYPE: ICD-10-CM

## 2023-02-15 RX ORDER — POLYETHYLENE GLYCOL 3350, SODIUM SULFATE ANHYDROUS, SODIUM BICARBONATE, SODIUM CHLORIDE, POTASSIUM CHLORIDE 236; 22.74; 6.74; 5.86; 2.97 G/4L; G/4L; G/4L; G/4L; G/4L
4000 POWDER, FOR SOLUTION ORAL ONCE
Qty: 4000 ML | Refills: 0 | Status: SHIPPED | OUTPATIENT
Start: 2023-02-15 | End: 2023-02-15

## 2023-02-15 RX ORDER — MELOXICAM 7.5 MG/1
TABLET ORAL
COMMUNITY
Start: 2023-01-18

## 2023-02-15 RX ORDER — POLYETHYLENE GLYCOL 3350 17 G/17G
238 POWDER, FOR SOLUTION ORAL ONCE
Qty: 238 G | Refills: 0 | Status: SHIPPED | OUTPATIENT
Start: 2023-02-15 | End: 2023-02-15

## 2023-02-15 NOTE — TELEPHONE ENCOUNTER
Scheduled date of EGD/colonoscopy (as of today):3/29/23  Physician performing EGD/colonoscopy:Jorje  Location of EGD/colonoscopy:[facility]  Clearances:  NA

## 2023-02-15 NOTE — PROGRESS NOTES
[facility] - Outpatient Consultation  Snow Kirk 78 y o  male MRN: [id number]  Encounter: [id number]          ASSESSMENT AND PLAN:    Snow Kirk is a 78 y o  male with PMH of schizophrenia, umbilical hernia, HLD, COPD, tobacco use disorder, prior colon polyps presenting for evaluation of nausea/emesis  Nausea, Emesis  - Etiology unclear in setting of incomplete hx due to pt psychiatric comorbidities  - Objective data with RUQ showing cholelithiasis, however no RUQ pain on palpation, normal liver chemistries  Low clinical s/f gallbladder etiology of sx  Recommend f/u with general surgery   - Plan for EGD with gastric and duodenal biopsies     Colon Polyps   - Hx of colon polyps on colonoscopy 2021  - Repeat colonoscopy now with 2 day prep  - Counseled pt on importance of this test and on staying NPO and avoiding cigarettes on day of procedure (states that this is the reason his 2022 colonoscopies were not completed)    Umbilical Hernia  - Referral to general surgery    1  Umbilical hernia without obstruction and without gangrene    2  Nausea and vomiting, unspecified vomiting type    3  Polyp of colon, unspecified part of colon, unspecified type      Orders Placed This Encounter   Procedures   • CT abdomen pelvis w contrast   • Ambulatory Referral to General Surgery   • Colonoscopy   • EGD     ______________________________________________________________________    HPI:    Snow Kirk is a 78 y o  male with PMH of schizophrenia, umbilical hernia, HLD, COPD, tobacco use disorder, prior colon polyps presenting for evaluation of nausea/emesis  Pt and caregiver from long term care facility report that nausea began a few months ago, worse with food (pt states cheese and rice)  No blood in emesis  Endorses weight loss as well  Had RUQ U/S with cholelithiasis  Has umbilical hernia which is causing him abdominal pain but no other abdominal pain       Hx notable for colonoscopies in 2020 and 2021 with poor prep  2021 colonoscopy with pancolonic polyps noted  Repeat colonoscopy with 2 day prep ordered but not yet performed  REVIEW OF SYSTEMS:    CONSTITUTIONAL: Denies any fever, chills, rigors, and weight loss  HEENT: No earache or tinnitus  Denies hearing loss or visual disturbances  CARDIOVASCULAR: No chest pain or palpitations  RESPIRATORY: Denies any cough, hemoptysis, shortness of breath or dyspnea on exertion  GASTROINTESTINAL: As noted in the History of Present Illness  GENITOURINARY: No problems with urination  Denies any hematuria or dysuria  NEUROLOGIC: No dizziness or vertigo, denies headaches  MUSCULOSKELETAL: Denies any muscle or joint pain  SKIN: Denies skin rashes or itching  ENDOCRINE: Denies excessive thirst  Denies intolerance to heat or cold  PSYCHOSOCIAL: Denies depression or anxiety  Denies any recent memory loss  Historical Information   Past Medical History:   Diagnosis Date   • Atopic rhinitis    • BPH with urinary obstruction    • COPD (chronic obstructive pulmonary disease) ([facility])    • Hyperlipidemia    • Incomplete bladder emptying    • Schizophrenia ([facility])      Past Surgical History:   Procedure Laterality Date   • RIB FRACTURE SURGERY       Social History   Social History     Substance and Sexual Activity   Alcohol Use No    Comment: hx  of hard liquor, regular use, quit at age 33       Social History     Substance and Sexual Activity   Drug Use No     Social History     Tobacco Use   Smoking Status Every Day   • Packs/day: 1 00   • Years: 45 00   • Pack years: 45 00   • Types: Cigarettes   Smokeless Tobacco Never     Family History   Problem Relation Age of Onset   • Dementia Mother    • Heart attack Father    • Substance Abuse Sister         cigarettes   • Substance Abuse Brother    • Alcohol abuse Brother        Meds/Allergies       Current Outpatient Medications:   •  acetaminophen (TYLENOL) 500 mg tablet  •  albuterol (PROVENTIL HFA,VENTOLIN HFA) 90 mcg/act inhaler  •  ASPIRIN 81 PO  •  b complex vitamins capsule  •  benztropine (COGENTIN) 1 mg tablet  •  buPROPion (WELLBUTRIN XL) 150 mg 24 hr tablet  •  buPROPion (WELLBUTRIN XL) 300 mg 24 hr tablet  •  Cholecalciferol (VITAMIN D3) 5000 units CAPS  •  folic acid (FOLVITE) 1 mg tablet  •  haloperidol (HALDOL) 10 mg tablet  •  haloperidol (HALDOL) 2 mg tablet  •  haloperidol (HALDOL) 5 mg tablet  •  lurasidone (LATUDA) 120 mg tablet  •  metFORMIN (GLUCOPHAGE) 500 mg tablet  •  Multiple Vitamins-Minerals (MULTIVITAMIN WITH MINERALS) tablet  •  Naloxone HCl (NARCAN NA)  •  nicotine (NICODERM CQ) 14 mg/24hr TD 24 hr patch  •  nicotine (NICODERM CQ) 21 mg/24 hr TD 24 hr patch  •  nicotine (NICODERM CQ) 7 mg/24hr TD 24 hr patch  •  nicotine polacrilex (COMMIT) 2 MG lozenge  •  nicotine polacrilex (NICORETTE) 2 mg gum  •  nicotine polacrilex (Nicorette) 2 mg gum  •  omeprazole (PriLOSEC) 20 mg delayed release capsule  •  polyethylene glycol (GLYCOLAX) 17 GM/SCOOP powder  •  pravastatin (PRAVACHOL) 40 mg tablet  •  sertraline (ZOLOFT) 100 mg tablet  •  tamsulosin (FLOMAX) 0 4 mg    No Known Allergies        Objective     Blood pressure 129/88, pulse 77, temperature 98 9 °F (37 2 °C), temperature source Tympanic, height 6' 4" (1 93 m), weight 73 5 kg (162 lb)  Body mass index is 19 72 kg/m²  Wt Readings from Last 3 Encounters:   02/15/23 73 5 kg (162 lb)   01/16/23 77 kg (169 lb 12 8 oz)   01/10/23 73 5 kg (162 lb)        PHYSICAL EXAM:      General Appearance:   Alert, cooperative, no distress   HEENT:   Normocephalic, atraumatic, anicteric  Neck:  Supple, symmetrical, trachea midline   Lungs:   Clear to auscultation bilaterally; no rales, rhonchi or wheezing; respirations unlabored    Heart[de-identified]   Regular rate and rhythm; no murmur, rub, or gallop     Abdomen:   Soft, non-tender, non-distended; normal bowel sounds; umbilical hernia noted, no organomegaly    Genitalia:   Deferred Rectal:   Deferred    Extremities:  No cyanosis, clubbing or edema    Pulses:  2+ and symmetric    Skin:  No jaundice, rashes, or lesions    Lymph nodes:  No palpable cervical lymphadenopathy        Lab Results:         Lab Units 12/27/22  1908 10/17/22  1105   SODIUM mmol/L 138 135   POTASSIUM mmol/L 4 2 4 1   CHLORIDE mmol/L 102 100   CO2 mmol/L 29 29   BUN mg/dL 11 13   CREATININE mg/dL 0 89 0 93   GLUCOSE RANDOM mg/dL 96  --    CALCIUM mg/dL 9 2 9 9            Lab Units 12/27/22 1908 10/17/22  1105   TOTAL PROTEIN g/dL 7 5 7 2   ALBUMIN g/dL 3 9 3 8   TOTAL BILIRUBIN mg/dL 0 20 0 40   AST U/L 24 18   ALT U/L 32 30   ALK PHOS U/L 69 71           Lab Units 12/27/22  1908   WBC Thousand/uL 7 66   HEMOGLOBIN g/dL 14 2   HEMATOCRIT % 41 8   PLATELETS Thousands/uL 300   MCV fL 96   MCH pg 32 6   MCHC g/dL 34 0   RDW % 11 9   MPV fL 10 0   NRBC AUTO /100 WBCs 0   NEUTROS PCT % 49   IMMATURE GRANULOCYTES % % 0   LYMPHS PCT % 34   MONOS PCT % 9   EOS PCT % 7*   BASOS PCT % 1   NEUTROS ABS Thousands/µL 3 81   IMMATURE GRANULOCYES ABS Thousand/uL 0 02   LYMPHS ABS Thousands/µL 2 60   MONOS ABS Thousand/µL 0 65   EOS ABS Thousand/µL 0 50   BASOS ABS Thousands/µL 0 08       No results for input(s): IRON, TRANSFERRIN, TRANSFERSAT, FERRITIN, RETIC in the last 89876 hours  No results found for: CRP    No results found for: [id number], TSH    Radiology Results:   US right upper quadrant    Result Date: 1/25/2023  Narrative: RIGHT UPPER QUADRANT ULTRASOUND INDICATION:     R11 2: Nausea with vomiting, unspecified  COMPARISON:  None TECHNIQUE:   Real-time ultrasound of the right upper quadrant was performed with a curvilinear transducer with both volumetric sweeps and still imaging techniques  FINDINGS: PANCREAS:  Visualized portions of the pancreas are within normal limits  AORTA AND IVC:  Visualized portions are normal for patient age  LIVER: Size:  Within normal range    The liver measures 14 0 cm in the midclavicular line  Contour:  Surface contour is smooth  Parenchyma:  Echogenicity and echotexture are within normal limits  No liver mass identified  2 small 0 8 and 0 9 cm simple cysts in the left lobe of liver noted  Limited imaging of the main portal vein shows it to be patent and hepatopetal   BILIARY: The gallbladder is normal in caliber  No wall thickening or pericholecystic fluid  Shadowing gallstone(s) identified  No sonographic Saldana's sign  No intrahepatic biliary dilatation  CBD measures 6 0 mm  No choledocholithiasis  KIDNEY: Right kidney measures 11 5 x 5 3 x 6 4 cm  Volume 205 0 mL Kidney within normal limits  ASCITES:   None  Impression: Cholelithiasis Workstation performed: [id number]     Gastroenterological Procedures:   12/24/20 Colonoscopy:  FINDINGS:  • Small, internal hemorrhoids  • All observed locations appeared normal   • All observed locations appeared normal  Limited by poor prep with liquid and some solid stool throughout the colon  IMPRESSION:  No polyps on today's exam   Exam was limited by poor preparation        RECOMMENDATION:  Repeat in 1 year due to an inadequate bowel preparation    11/17/21 Colonoscopy:  FINDINGS:  • Pancolonic polyps   Poor prep    John Cameron MD  PGY-4 Gastroenterology Fellow  2/15/2023 10:03 AM

## 2023-02-21 LAB — HBA1C MFR BLD HPLC: 5.7 %

## 2023-02-23 ENCOUNTER — HOSPITAL ENCOUNTER (EMERGENCY)
Facility: HOSPITAL | Age: 68
Discharge: HOME | End: 2023-02-23
Attending: EMERGENCY MEDICINE | Admitting: EMERGENCY MEDICINE

## 2023-02-23 ENCOUNTER — APPOINTMENT (EMERGENCY)
Dept: CT IMAGING | Facility: HOSPITAL | Age: 68
End: 2023-02-23

## 2023-02-23 VITALS
RESPIRATION RATE: 18 BRPM | BODY MASS INDEX: 20.02 KG/M2 | HEART RATE: 66 BPM | SYSTOLIC BLOOD PRESSURE: 127 MMHG | DIASTOLIC BLOOD PRESSURE: 73 MMHG | WEIGHT: 164.4 LBS | HEIGHT: 76 IN | TEMPERATURE: 98.1 F | OXYGEN SATURATION: 96 %

## 2023-02-23 DIAGNOSIS — K80.20 CHOLELITHIASIS: ICD-10-CM

## 2023-02-23 DIAGNOSIS — R11.2 NAUSEA AND VOMITING: Primary | ICD-10-CM

## 2023-02-23 DIAGNOSIS — K59.00 CONSTIPATION: ICD-10-CM

## 2023-02-23 DIAGNOSIS — K14.0 TONGUE ULCERATION: ICD-10-CM

## 2023-02-23 DIAGNOSIS — R33.8 ACUTE URINARY RETENTION: ICD-10-CM

## 2023-02-23 DIAGNOSIS — K44.9 HIATAL HERNIA: ICD-10-CM

## 2023-02-23 LAB
ALBUMIN SERPL BCP-MCNC: 4.1 G/DL (ref 3.5–5)
ALP SERPL-CCNC: 50 U/L (ref 34–104)
ALT SERPL W P-5'-P-CCNC: 20 U/L (ref 7–52)
ANION GAP SERPL CALCULATED.3IONS-SCNC: 6 MMOL/L (ref 4–13)
AST SERPL W P-5'-P-CCNC: 18 U/L (ref 13–39)
ATRIAL RATE: 65 BPM
BASOPHILS # BLD AUTO: 0.06 THOUSANDS/ÂΜL (ref 0–0.1)
BASOPHILS NFR BLD AUTO: 1 % (ref 0–1)
BILIRUB SERPL-MCNC: 0.35 MG/DL (ref 0.2–1)
BILIRUB UR QL STRIP: NEGATIVE
BUN SERPL-MCNC: 14 MG/DL (ref 5–25)
CALCIUM SERPL-MCNC: 9.3 MG/DL (ref 8.4–10.2)
CHLORIDE SERPL-SCNC: 104 MMOL/L (ref 96–108)
CLARITY UR: CLEAR
CO2 SERPL-SCNC: 26 MMOL/L (ref 21–32)
COLOR UR: YELLOW
CREAT SERPL-MCNC: 0.79 MG/DL (ref 0.6–1.3)
EOSINOPHIL # BLD AUTO: 0.57 THOUSAND/ÂΜL (ref 0–0.61)
EOSINOPHIL NFR BLD AUTO: 8 % (ref 0–6)
ERYTHROCYTE [DISTWIDTH] IN BLOOD BY AUTOMATED COUNT: 12.8 % (ref 11.6–15.1)
GFR SERPL CREATININE-BSD FRML MDRD: 93 ML/MIN/1.73SQ M
GLUCOSE SERPL-MCNC: 104 MG/DL (ref 65–140)
GLUCOSE UR STRIP-MCNC: NEGATIVE MG/DL
HCT VFR BLD AUTO: 40.7 % (ref 36.5–49.3)
HGB BLD-MCNC: 14 G/DL (ref 12–17)
HGB UR QL STRIP.AUTO: NEGATIVE
IMM GRANULOCYTES # BLD AUTO: 0.03 THOUSAND/UL (ref 0–0.2)
IMM GRANULOCYTES NFR BLD AUTO: 0 % (ref 0–2)
KETONES UR STRIP-MCNC: NEGATIVE MG/DL
LEUKOCYTE ESTERASE UR QL STRIP: NEGATIVE
LIPASE SERPL-CCNC: 25 U/L (ref 11–82)
LYMPHOCYTES # BLD AUTO: 2.67 THOUSANDS/ÂΜL (ref 0.6–4.47)
LYMPHOCYTES NFR BLD AUTO: 36 % (ref 14–44)
MCH RBC QN AUTO: 32.9 PG (ref 26.8–34.3)
MCHC RBC AUTO-ENTMCNC: 34.4 G/DL (ref 31.4–37.4)
MCV RBC AUTO: 96 FL (ref 82–98)
MONOCYTES # BLD AUTO: 0.83 THOUSAND/ÂΜL (ref 0.17–1.22)
MONOCYTES NFR BLD AUTO: 11 % (ref 4–12)
NEUTROPHILS # BLD AUTO: 3.22 THOUSANDS/ÂΜL (ref 1.85–7.62)
NEUTS SEG NFR BLD AUTO: 44 % (ref 43–75)
NITRITE UR QL STRIP: NEGATIVE
NRBC BLD AUTO-RTO: 0 /100 WBCS
P AXIS: 85 DEGREES
PH UR STRIP.AUTO: 7 [PH]
PLATELET # BLD AUTO: 298 THOUSANDS/UL (ref 149–390)
PMV BLD AUTO: 9.7 FL (ref 8.9–12.7)
POTASSIUM SERPL-SCNC: 4.1 MMOL/L (ref 3.5–5.3)
PR INTERVAL: 174 MS
PROT SERPL-MCNC: 6.9 G/DL (ref 6.4–8.4)
PROT UR STRIP-MCNC: NEGATIVE MG/DL
QRS AXIS: 74 DEGREES
QRSD INTERVAL: 90 MS
QT INTERVAL: 438 MS
QTC INTERVAL: 455 MS
RBC # BLD AUTO: 4.26 MILLION/UL (ref 3.88–5.62)
SODIUM SERPL-SCNC: 136 MMOL/L (ref 135–147)
SP GR UR STRIP.AUTO: 1.01 (ref 1–1.03)
T WAVE AXIS: 64 DEGREES
UROBILINOGEN UR STRIP-ACNC: <2 MG/DL
VENTRICULAR RATE: 65 BPM
WBC # BLD AUTO: 7.38 THOUSAND/UL (ref 4.31–10.16)

## 2023-02-23 RX ORDER — ONDANSETRON 2 MG/ML
4 INJECTION INTRAMUSCULAR; INTRAVENOUS ONCE
Status: COMPLETED | OUTPATIENT
Start: 2023-02-23 | End: 2023-02-23

## 2023-02-23 RX ORDER — ACETAMINOPHEN 325 MG/1
975 TABLET ORAL ONCE
Status: COMPLETED | OUTPATIENT
Start: 2023-02-23 | End: 2023-02-23

## 2023-02-23 RX ADMIN — SODIUM CHLORIDE 1000 ML: 0.9 INJECTION, SOLUTION INTRAVENOUS at 11:04

## 2023-02-23 RX ADMIN — ONDANSETRON 4 MG: 2 INJECTION INTRAMUSCULAR; INTRAVENOUS at 11:07

## 2023-02-23 RX ADMIN — IOHEXOL 50 ML: 300 INJECTION, SOLUTION INTRAVENOUS at 12:43

## 2023-02-23 RX ADMIN — ACETAMINOPHEN 975 MG: 325 TABLET, FILM COATED ORAL at 13:50

## 2023-02-23 RX ADMIN — IOHEXOL 100 ML: 350 INJECTION, SOLUTION INTRAVENOUS at 12:43
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
## 2023-02-23 NOTE — DISCHARGE INSTRUCTIONS
MiraLax is available over-the-counter and works well for constipation  Dissolve 1 cap full in a beverage of your choice once daily until having regular bowel movements for up to 1 week

## 2023-02-23 NOTE — ED NOTES
[address]:  ext Idalmis Lockett (DEBBEI) would like an update once a disposition is chosen for the patient   States she can be reached at her ext until [facility]  02/23/23 7430

## 2023-02-23 NOTE — ED NOTES
Attempted to call  for [employer], left voicemail, awaiting reply        Shirley Adams, RN  02/23/23 9783

## 2023-02-23 NOTE — ED NOTES
Spoke to staff at [facility], staff to call back for update on discharge/        Ina Hernandez RN  02/23/23 3925

## 2023-02-24 NOTE — ED NOTES
Patient d/c to [address] [facility] with abdul and standard drainage bag   Referral placed for VNA per [facility] staff  request       Crystal Howell RN  02/23/23 2327

## 2023-02-28 ENCOUNTER — HOSPITAL ENCOUNTER (OUTPATIENT)
Dept: CT IMAGING | Facility: HOSPITAL | Age: 68
Discharge: HOME | End: 2023-02-28

## 2023-02-28 DIAGNOSIS — R11.2 NAUSEA AND VOMITING, UNSPECIFIED VOMITING TYPE: ICD-10-CM

## 2023-02-28 DIAGNOSIS — K42.9 UMBILICAL HERNIA WITHOUT OBSTRUCTION AND WITHOUT GANGRENE: ICD-10-CM

## 2023-02-28 RX ADMIN — IOHEXOL 100 ML: 350 INJECTION, SOLUTION INTRAVENOUS at 08:46

## 2023-03-01 ENCOUNTER — PROCEDURE VISIT (OUTPATIENT)
Dept: UROLOGY | Facility: CLINIC | Age: 68
End: 2023-03-01

## 2023-03-01 VITALS
DIASTOLIC BLOOD PRESSURE: 90 MMHG | BODY MASS INDEX: 19.48 KG/M2 | RESPIRATION RATE: 20 BRPM | SYSTOLIC BLOOD PRESSURE: 140 MMHG | WEIGHT: 160 LBS | HEIGHT: 76 IN | HEART RATE: 88 BPM

## 2023-03-01 DIAGNOSIS — R33.8 ACUTE URINARY RETENTION: Primary | ICD-10-CM

## 2023-03-01 LAB — POST-VOID RESIDUAL VOLUME, ML POC: 348 ML

## 2023-03-01 RX ORDER — CHLORAL HYDRATE 500 MG
1000 CAPSULE ORAL DAILY
COMMUNITY

## 2023-03-01 NOTE — PROGRESS NOTES
3/1/2023    Valerie Erwin  1955  [id number]    Diagnosis  Chief Complaint    Urinary Retention         Patient presents for void trial managed by our office    Plan  Encounter sent to [facility] for advice then call the [facility]  Procedure Abdul removal/voiding trial    Abdul catheter removed after deflation of an intact balloon  Patient tolerated well  Encouraged patient to hydrate well and return this afternoon for post void residual   he knows he may return early if uncomfortable and unable to urinate  Patient agrees to this plan  Patient returned this afternoon  Patient states able to void  Patient voided unknown  ml while in office  Bladder ultrasound performed and PVR measured 348 ml  Recent Results (from the past 5 hour(s))   POCT Measure PVR    Collection Time: 03/01/23  1:32 PM   Result Value Ref Range    POST-VOID RESIDUAL VOLUME, ML  mL       Vitals:    03/01/23 0911   BP: 140/90   Pulse: 88   Resp: 20   Weight: 72 6 kg (160 lb)   Height: 6' 4" (1 93 m)   Discussed results with patient  Patient refusing abdul catheter  Will send encounter to  for advice then call the [facility]  Patient mentioned he has a "sore" on his tongue  " Sore" appears ulcerated and he is a daily cigarette smoker  Contacted [facility]  about the abnormal finding on patient's tongue  Arin Layne will schedule an appointment for patient      Kyle Maurice RN

## 2023-03-02 ENCOUNTER — TELEPHONE (OUTPATIENT)
Dept: UROLOGY | Facility: CLINIC | Age: 68
End: 2023-03-02

## 2023-03-02 ENCOUNTER — TELEPHONE (OUTPATIENT)
Dept: GASTROENTEROLOGY | Facility: CLINIC | Age: 68
End: 2023-03-02

## 2023-03-02 ENCOUNTER — OFFICE VISIT (OUTPATIENT)
Dept: FAMILY MEDICINE CLINIC | Facility: HOSPITAL | Age: 68
End: 2023-03-02

## 2023-03-02 ENCOUNTER — TELEPHONE (OUTPATIENT)
Dept: UROLOGY | Facility: AMBULATORY SURGERY CENTER | Age: 68
End: 2023-03-02

## 2023-03-02 VITALS
HEIGHT: 76 IN | BODY MASS INDEX: 19.73 KG/M2 | TEMPERATURE: 97.5 F | WEIGHT: 162 LBS | DIASTOLIC BLOOD PRESSURE: 80 MMHG | HEART RATE: 86 BPM | SYSTOLIC BLOOD PRESSURE: 122 MMHG | OXYGEN SATURATION: 95 % | RESPIRATION RATE: 18 BRPM

## 2023-03-02 DIAGNOSIS — N40.1 BENIGN PROSTATIC HYPERPLASIA WITH NOCTURIA: ICD-10-CM

## 2023-03-02 DIAGNOSIS — R35.1 BENIGN PROSTATIC HYPERPLASIA WITH NOCTURIA: ICD-10-CM

## 2023-03-02 DIAGNOSIS — K42.9 UMBILICAL HERNIA WITHOUT OBSTRUCTION AND WITHOUT GANGRENE: ICD-10-CM

## 2023-03-02 DIAGNOSIS — F19.11 HISTORY OF DRUG ABUSE IN REMISSION (HCC): ICD-10-CM

## 2023-03-02 DIAGNOSIS — F20.9 SCHIZOPHRENIA, UNSPECIFIED TYPE (HCC): ICD-10-CM

## 2023-03-02 DIAGNOSIS — K14.0 TONGUE ULCER: Primary | ICD-10-CM

## 2023-03-02 DIAGNOSIS — J41.1 MUCOPURULENT CHRONIC BRONCHITIS (HCC): ICD-10-CM

## 2023-03-02 DIAGNOSIS — E11.9 TYPE 2 DIABETES MELLITUS WITHOUT COMPLICATION, WITHOUT LONG-TERM CURRENT USE OF INSULIN (HCC): ICD-10-CM

## 2023-03-02 DIAGNOSIS — F17.200 TOBACCO DEPENDENCY: ICD-10-CM

## 2023-03-02 PROBLEM — E44.1 MILD PROTEIN-CALORIE MALNUTRITION (HCC): Status: RESOLVED | Noted: 2021-09-29 | Resolved: 2023-03-02

## 2023-03-02 PROBLEM — R11.2 NAUSEA AND VOMITING: Status: RESOLVED | Noted: 2023-01-10 | Resolved: 2023-03-02

## 2023-03-02 RX ORDER — POLYETHYLENE GLYCOL 3350 17 G
2 POWDER IN PACKET (EA) ORAL
Qty: 168 LOZENGE | Refills: 1 | Status: SHIPPED | OUTPATIENT
Start: 2023-03-02

## 2023-03-02 RX ORDER — LIDOCAINE HYDROCHLORIDE 20 MG/ML
15 SOLUTION OROPHARYNGEAL 3 TIMES DAILY PRN
Qty: 100 ML | Refills: 3 | Status: SHIPPED | OUTPATIENT
Start: 2023-03-02

## 2023-03-02 NOTE — ASSESSMENT & PLAN NOTE
Patient is a smoker has chronic bronchitis  He feels that his reading is at baseline  He is still smoking  We discussed smoking cessation and he accepted nicotine just to try to quit smoking  I heard no wheezing today    Continue albuterol as needed

## 2023-03-02 NOTE — TELEPHONE ENCOUNTER
Bety from facility requesting note from yesterdays visit to be faxed to [phone]     Faxed as requested
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
## 2023-03-02 NOTE — ASSESSMENT & PLAN NOTE
Patient has BPH with urinary retention  He saw urology yesterday and Call catheter was removed  He still had significant postvoid residual but patient refused reinsertion of Call catheter  Patient feels that he voided well since last night  Denies urinary retention feelings  On examination I felt no suprapubic tenderness or masses    Continue Flomax

## 2023-03-02 NOTE — TELEPHONE ENCOUNTER
Please see Ninfa Serrato or Nawaf Teixeira in [facility] in 4 weeks   If PVR persistently over 350 would suggest cysto/trus from there

## 2023-03-02 NOTE — ASSESSMENT & PLAN NOTE
Lab Results   Component Value Date    HGBA1C 5 7 (H) 02/21/2023       Patient has type 2 diabetes  He thyroids before without diarrhea  A1c was well controlled    Sensation on monofilament testing today was normal   Continue metformin

## 2023-03-02 NOTE — TELEPHONE ENCOUNTER
Patient seen yesterday for void trial   ml  Patient refused catheter  On Flomax  Please advise follow up for patient   Thanks

## 2023-03-02 NOTE — TELEPHONE ENCOUNTER
lvm for New Vitae at med-room to return call and inform us what prep instructions pt was given at ov at [facility]-nothing is documented     Our MA's have to call the patient to go over the prep instructions

## 2023-03-03 ENCOUNTER — TELEPHONE (OUTPATIENT)
Dept: OTHER | Facility: OTHER | Age: 68
End: 2023-03-03

## 2023-03-03 NOTE — PROGRESS NOTES
I supervised the Advanced Practitioner  I reviewed the Advanced Practitioner note and agree      Camilo Lennox, MD 03/03/23

## 2023-03-03 NOTE — TELEPHONE ENCOUNTER
Called and spoke with [facility]  Patient scheduled for appointment with Yumiko Almazan in the [facility] office on 4/6/23 @ 1:30 pm for PVR/evaluation  Office address provided to [employer]

## 2023-03-10 NOTE — TELEPHONE ENCOUNTER
Patient's living facility returned call for patient prep information  Please call Bety back today to discuss

## 2023-03-21 DIAGNOSIS — K14.0 TONGUE ULCER: ICD-10-CM

## 2023-03-21 RX ORDER — LIDOCAINE HYDROCHLORIDE 20 MG/ML
15 SOLUTION OROPHARYNGEAL 3 TIMES DAILY PRN
Qty: 100 ML | Refills: 3 | Status: SHIPPED | OUTPATIENT
Start: 2023-03-21

## 2023-03-22 ENCOUNTER — TELEPHONE (OUTPATIENT)
Dept: GASTROENTEROLOGY | Facility: CLINIC | Age: 68
End: 2023-03-22

## 2023-03-22 NOTE — TELEPHONE ENCOUNTER
----- Message from Tootie Jimenez MD sent at 3/22/2023  1:10 PM EDT -----  Hi,    Can you please schedule this patient for a follow up office visit with me in May or June?      Thanks,  Chinyere Mendoza

## 2023-03-28 ENCOUNTER — NURSE TRIAGE (OUTPATIENT)
Dept: OTHER | Facility: OTHER | Age: 68
End: 2023-03-28

## 2023-03-28 NOTE — TELEPHONE ENCOUNTER
"  Reason for Disposition  • Bowel prep for colonoscopy, questions about    Answer Assessment - Initial Assessment Questions  1  DATE/TIME: \"When did you have your colonoscopy? \"       Scheduled for tomorrow, 3/29/2023  2  MAIN CONCERN: Tankteresa Werner is your main concern right now? \" \"What questions do you have? \"      Caregiver states that patient's prescription was for 4 Dulcolax tablets for his 2-day bowel prep, and that bowel prep instructions to only say to give him 2 of the tablets on prep day 2  Reviewed bowel prep file in Teams with caretaker, which states to give 2 Dulcolax on prep today to between 5-6 PM   She verbalized understanding and was appreciative      Protocols used: COLONOSCOPY SYMPTOMS AND QUESTIONS-ADULT-    "

## 2023-03-28 NOTE — TELEPHONE ENCOUNTER
"Regarding: Coloscopy prep question   ----- Message from Madi Moreira RN sent at 3/28/2023  6:04 PM EDT -----  \"I am calling for this patient who lives in a group home where I am the Lead on tonight  He has a Coloscopy for tomorrow and I have one question regarding the Doculax  \"    "

## 2023-03-29 ENCOUNTER — ANESTHESIA EVENT (OUTPATIENT)
Dept: GASTROENTEROLOGY | Facility: HOSPITAL | Age: 68
End: 2023-03-29

## 2023-03-29 ENCOUNTER — ANESTHESIA (OUTPATIENT)
Dept: GASTROENTEROLOGY | Facility: HOSPITAL | Age: 68
End: 2023-03-29

## 2023-03-29 ENCOUNTER — HOSPITAL ENCOUNTER (OUTPATIENT)
Dept: GASTROENTEROLOGY | Facility: HOSPITAL | Age: 68
Setting detail: OUTPATIENT SURGERY
Discharge: HOME | End: 2023-03-29
Attending: INTERNAL MEDICINE

## 2023-03-29 VITALS
TEMPERATURE: 97.7 F | HEART RATE: 63 BPM | SYSTOLIC BLOOD PRESSURE: 141 MMHG | RESPIRATION RATE: 18 BRPM | WEIGHT: 161 LBS | DIASTOLIC BLOOD PRESSURE: 79 MMHG | HEIGHT: 76 IN | OXYGEN SATURATION: 97 % | BODY MASS INDEX: 19.61 KG/M2

## 2023-03-29 DIAGNOSIS — K63.5 POLYP OF COLON, UNSPECIFIED PART OF COLON, UNSPECIFIED TYPE: ICD-10-CM

## 2023-03-29 DIAGNOSIS — K20.90 ESOPHAGITIS: Primary | ICD-10-CM

## 2023-03-29 DIAGNOSIS — R11.2 NAUSEA AND VOMITING, UNSPECIFIED VOMITING TYPE: ICD-10-CM

## 2023-03-29 LAB — GLUCOSE SERPL-MCNC: 96 MG/DL (ref 65–140)

## 2023-03-29 RX ORDER — OMEPRAZOLE 40 MG/1
40 CAPSULE, DELAYED RELEASE ORAL DAILY
Qty: 90 CAPSULE | Refills: 3 | Status: SHIPPED | OUTPATIENT
Start: 2023-03-29

## 2023-03-29 RX ORDER — PROPOFOL 10 MG/ML
INJECTION, EMULSION INTRAVENOUS AS NEEDED
Status: DISCONTINUED | OUTPATIENT
Start: 2023-03-29 | End: 2023-03-29

## 2023-03-29 RX ORDER — SODIUM CHLORIDE 9 MG/ML
INJECTION, SOLUTION INTRAVENOUS CONTINUOUS PRN
Status: DISCONTINUED | OUTPATIENT
Start: 2023-03-29 | End: 2023-03-29

## 2023-03-29 RX ADMIN — PROPOFOL 50 MG: 10 INJECTION, EMULSION INTRAVENOUS at 11:12

## 2023-03-29 RX ADMIN — PROPOFOL 50 MG: 10 INJECTION, EMULSION INTRAVENOUS at 11:01

## 2023-03-29 RX ADMIN — SODIUM CHLORIDE: 0.9 INJECTION, SOLUTION INTRAVENOUS at 10:33

## 2023-03-29 RX ADMIN — PROPOFOL 150 MG: 10 INJECTION, EMULSION INTRAVENOUS at 10:53

## 2023-03-29 RX ADMIN — PROPOFOL 50 MG: 10 INJECTION, EMULSION INTRAVENOUS at 11:17

## 2023-03-29 RX ADMIN — PROPOFOL 50 MG: 10 INJECTION, EMULSION INTRAVENOUS at 11:05

## 2023-03-29 RX ADMIN — PROPOFOL 50 MG: 10 INJECTION, EMULSION INTRAVENOUS at 11:22

## 2023-03-29 RX ADMIN — PROPOFOL 50 MG: 10 INJECTION, EMULSION INTRAVENOUS at 10:57

## 2023-03-29 NOTE — H&P
History and Physical - [facility]  Jorge Sharma 78 y o  male MRN: [id number]    HPI: Jorge Sharma is a 78y o  year old male who presents for EGD for nausea vomiting, colonoscopy for history of colon polyps  Last colonoscopy was in 2021  REVIEW OF SYSTEMS: Per the HPI, and otherwise unremarkable  Historical Information   Past Medical History:   Diagnosis Date   • Atopic rhinitis    • BPH with urinary obstruction    • COPD (chronic obstructive pulmonary disease) (HCC)    • Hyperlipidemia    • Incomplete bladder emptying    • Schizophrenia ([facility] [address] )    • Urinary retention      Past Surgical History:   Procedure Laterality Date   • RIB FRACTURE SURGERY       Social History   Social History     Substance and Sexual Activity   Alcohol Use No    Comment: hx  of hard liquor, regular use, quit at age 33       Social History     Substance and Sexual Activity   Drug Use No     Social History     Tobacco Use   Smoking Status Every Day   • Packs/day: 1 00   • Years: 45 00   • Pack years: 45 00   • Types: Cigarettes   Smokeless Tobacco Never     Family History   Problem Relation Age of Onset   • Heart attack Father    • Dementia Mother    • Substance Abuse Sister         cigarettes   • Substance Abuse Brother    • Alcohol abuse Brother        Meds/Allergies       Current Outpatient Medications:   •  ASPIRIN 81 PO  •  b complex vitamins capsule  •  benztropine (COGENTIN) 1 mg tablet  •  buPROPion (WELLBUTRIN XL) 300 mg 24 hr tablet  •  Cholecalciferol (VITAMIN D3) 5000 units CAPS  •  folic acid (FOLVITE) 1 mg tablet  •  haloperidol (HALDOL) 5 mg tablet  •  lurasidone (LATUDA) 120 mg tablet  •  meloxicam (MOBIC) 7 5 mg tablet  •  metFORMIN (GLUCOPHAGE) 500 mg tablet  •  Omega-3 Fatty Acids (fish oil) 1,000 mg  •  pravastatin (PRAVACHOL) 40 mg tablet  •  sertraline (ZOLOFT) 100 mg tablet  •  tamsulosin (FLOMAX) 0 4 mg  •  acetaminophen (TYLENOL) 500 mg tablet  •  albuterol (PROVENTIL HFA,VENTOLIN HFA) 90 "mcg/act inhaler  •  bisacodyl (DULCOLAX) 5 mg EC tablet  •  buPROPion (WELLBUTRIN XL) 150 mg 24 hr tablet  •  haloperidol (HALDOL) 10 mg tablet  •  haloperidol (HALDOL) 2 mg tablet  •  Lidocaine Viscous HCl (XYLOCAINE) 2 % mucosal solution  •  Multiple Vitamins-Minerals (MULTIVITAMIN WITH MINERALS) tablet  •  Naloxone HCl (NARCAN NA)  •  nicotine polacrilex (COMMIT) 2 MG lozenge  •  polyethylene glycol (Golytely) 4000 mL solution  •  polyethylene glycol (MiraLax) 17 GM/SCOOP powder    No Known Allergies    Objective     /73   Pulse 64   Temp 97 7 °F (36 5 °C) (Temporal)   Resp 18   Ht 6' 4\" (1 93 m)   Wt 73 kg (161 lb)   SpO2 99%   BMI 19 60 kg/m²     PHYSICAL EXAM    Gen: NAD AAOx3  Head: Normocephalic, Atraumatic  CV: S1S2 RRR no m/r/g  CHEST: Clear b/l no c/r/w  ABD: soft, +BS NT/ND  EXT: no edema    ASSESSMENT/PLAN:  This is a 78y o  year old male here for EGD and colonoscopy, and he is stable and optimized for his procedure        "

## 2023-03-29 NOTE — ANESTHESIA POSTPROCEDURE EVALUATION
Post-Op Assessment Note    CV Status:  Stable  Pain Score: 0    Pain management: adequate     Mental Status:  Alert and awake   Hydration Status:  Euvolemic   PONV Controlled:  Controlled   Airway Patency:  Patent      Post Op Vitals Reviewed: Yes      Staff: CRNA         There were no known notable events for this encounter      /81 (03/29/23 1133)    Temp   per pacu   Pulse 65 (03/29/23 1133)   Resp 18 (03/29/23 1133)    SpO2 96 % (03/29/23 1133)

## 2023-03-29 NOTE — ANESTHESIA PREPROCEDURE EVALUATION
Procedure:  COLONOSCOPY  EGD    Relevant Problems   CARDIO   (+) Mixed hyperlipidemia      ENDO   (+) Type 2 diabetes mellitus without complication, without long-term current use of insulin (HCC)      MUSCULOSKELETAL   (+) Primary osteoarthritis of right knee   (+) Primary osteoarthritis of right shoulder      NEURO/PSYCH   (+) Depressive disorder   (+) History of drug abuse in remission ([facility])   (+) Schizophrenia (HCC)      PULMONARY   (+) COPD (chronic obstructive pulmonary disease) ([facility])   (+) Mucopurulent chronic bronchitis ([facility])   (+) Smoker        Physical Exam    Airway    Mallampati score: II  TM Distance: >3 FB  Neck ROM: full     Dental   Comment: Very poor dentition  Multiple missing, broken and decaying teeth  Ulcer on left side of tongue  ,     Cardiovascular      Pulmonary      Other Findings        Anesthesia Plan  ASA Score- 3     Anesthesia Type- IV sedation with anesthesia with ASA Monitors  Additional Monitors:   Airway Plan:           Plan Factors-    Chart reviewed  Patient summary reviewed  Patient is a current smoker  Patient smoked on day of surgery  Induction- intravenous  Postoperative Plan-     Informed Consent- Anesthetic plan and risks discussed with patient  I personally reviewed this patient with the CRNA  Discussed and agreed on the Anesthesia Plan with the CRNA  Alexandre Lawson

## 2023-05-02 ENCOUNTER — TELEPHONE (OUTPATIENT)
Dept: OBGYN CLINIC | Facility: HOSPITAL | Age: 68
End: 2023-05-02

## 2023-05-02 NOTE — TELEPHONE ENCOUNTER
Caller: Sister     Doctor: N/A    Reason for call: Wanted to make an appointment  Sister wanted Gurdeep Menjivar, but he is not seeing patients at this time   I offered another doctor/locations and she declined

## 2023-05-18 ENCOUNTER — TELEPHONE (OUTPATIENT)
Dept: FAMILY MEDICINE CLINIC | Facility: HOSPITAL | Age: 68
End: 2023-05-18

## 2023-05-18 NOTE — TELEPHONE ENCOUNTER
Pt was on Centrum vitamins but recently changed  Pt needs a basic note faxed stating that it is ok to switch from Centrum to Mature Multi, one tablet by mouth every day for supplement       Fax:  [phone]

## 2023-05-23 ENCOUNTER — RA CDI HCC (OUTPATIENT)
Dept: OTHER | Facility: HOSPITAL | Age: 68
End: 2023-05-23

## 2023-05-30 ENCOUNTER — OFFICE VISIT (OUTPATIENT)
Dept: FAMILY MEDICINE CLINIC | Facility: HOSPITAL | Age: 68
End: 2023-05-30

## 2023-05-30 VITALS
TEMPERATURE: 97.3 F | OXYGEN SATURATION: 98 % | WEIGHT: 158 LBS | SYSTOLIC BLOOD PRESSURE: 104 MMHG | BODY MASS INDEX: 19.24 KG/M2 | HEIGHT: 76 IN | HEART RATE: 77 BPM | RESPIRATION RATE: 16 BRPM | DIASTOLIC BLOOD PRESSURE: 68 MMHG

## 2023-05-30 DIAGNOSIS — F17.210 SMOKING GREATER THAN 20 PACK YEARS: ICD-10-CM

## 2023-05-30 DIAGNOSIS — E11.9 TYPE 2 DIABETES MELLITUS WITHOUT COMPLICATION, WITHOUT LONG-TERM CURRENT USE OF INSULIN (HCC): ICD-10-CM

## 2023-05-30 DIAGNOSIS — Z12.2 ENCOUNTER FOR SCREENING FOR LUNG CANCER: ICD-10-CM

## 2023-05-30 DIAGNOSIS — Z12.5 SCREENING FOR PROSTATE CANCER: ICD-10-CM

## 2023-05-30 DIAGNOSIS — Z00.00 MEDICARE ANNUAL WELLNESS VISIT, SUBSEQUENT: Primary | ICD-10-CM

## 2023-05-30 RX ORDER — PALIPERIDONE 3 MG/1
TABLET, EXTENDED RELEASE ORAL
COMMUNITY

## 2023-05-30 NOTE — PROGRESS NOTES
Assessment and Plan:     Problem List Items Addressed This Visit        Endocrine    Type 2 diabetes mellitus without complication, without long-term current use of insulin ([facility] [address] )    Relevant Orders    Comprehensive metabolic panel    Hemoglobin A1C    Lipid Panel with Direct LDL reflex   Other Visit Diagnoses     Medicare annual wellness visit, subsequent    -  Primary    Screening for prostate cancer        Relevant Orders    PSA, Total Screen    Encounter for screening for lung cancer        Relevant Orders    CT lung screening program    Smoking greater than 20 pack years        Relevant Orders    CT lung screening program           Preventive health issues were discussed with patient, and age appropriate screening tests were ordered as noted in patient's After Visit Summary  Personalized health advice and appropriate referrals for health education or preventive services given if needed, as noted in patient's After Visit Summary  History of Present Illness:     Patient presents for a Medicare Wellness Visit    HPI   Patient Care Team:  Marlo Ward MD as PCP - General (Internal Medicine)     Review of Systems:     Review of Systems   Constitutional: Negative for fever  HENT: Negative for hearing loss  Eyes: Negative for visual disturbance  Respiratory: Negative for cough and shortness of breath  Cardiovascular: Negative for chest pain and palpitations  Gastrointestinal: Negative for abdominal pain, blood in stool and constipation  Endocrine: Negative for polydipsia  Genitourinary: Negative for difficulty urinating and hematuria  Musculoskeletal: Negative for myalgias  Skin: Negative for rash  Neurological: Negative for headaches  Psychiatric/Behavioral: Negative for dysphoric mood  All other systems reviewed and are negative         Problem List:     Patient Active Problem List   Diagnosis   • Type 2 diabetes mellitus without complication, without long-term current use of insulin ([place] [address] )   • Mucopurulent chronic bronchitis ([facility])   • Benign prostatic hyperplasia with nocturia   • Schizophrenia ([place] [address] )   • Smoker   • Mixed hyperlipidemia   • History of drug abuse in remission ([place] [address] )   • Primary osteoarthritis of right knee   • Depressive disorder   • Nail dystrophy   • Umbilical hernia without obstruction and without gangrene   • Primary osteoarthritis of right shoulder   • Tongue ulcer   • COPD (chronic obstructive pulmonary disease) ([facility])      Past Medical and Surgical History:     Past Medical History:   Diagnosis Date   • Atopic rhinitis    • BPH with urinary obstruction    • COPD (chronic obstructive pulmonary disease) ([facility])    • Hyperlipidemia    • Incomplete bladder emptying    • Schizophrenia ([place] [address] )    • Urinary retention      Past Surgical History:   Procedure Laterality Date   • RIB FRACTURE SURGERY        Family History:     Family History   Problem Relation Age of Onset   • Heart attack Father    • Dementia Mother    • Substance Abuse Sister         cigarettes   • Substance Abuse Brother    • Alcohol abuse Brother       Social History:     Social History     Socioeconomic History   • Marital status: Single     Spouse name: None   • Number of children: None   • Years of education: None   • Highest education level: None   Occupational History   • None   Tobacco Use   • Smoking status: Every Day     Packs/day: 1 00     Years: 45 00     Total pack years: 45 00     Types: Cigarettes   • Smokeless tobacco: Never   Vaping Use   • Vaping Use: Never used   Substance and Sexual Activity   • Alcohol use: No     Comment: hx  of hard liquor, regular use, quit at age 33    • Drug use: No   • Sexual activity: Not Currently   Other Topics Concern   • None   Social History Narrative    [facility]ives at [facility]    Feels safe where he lives    No living will    Sees  Dentist occas      Social Determinants of Health     Financial Resource Strain: Not on file   Food Insecurity: Not on file   Transportation Needs: No Transportation Needs (12/3/2020)    PRAPARE - Transportation    • Lack of Transportation (Medical): No    • Lack of Transportation (Non-Medical):  No   Physical Activity: Inactive (12/3/2020)    Exercise Vital Sign    • Days of Exercise per Week: 0 days    • Minutes of Exercise per Session: 0 min   Stress: Stress Concern Present (12/3/2020)    [address]    • Feeling of Stress : Rather much   Social Connections: Not on file   Intimate Partner Violence: Not At Risk (12/3/2020)    Humiliation, Afraid, Rape, and Kick questionnaire    • Fear of Current or Ex-Partner: No    • Emotionally Abused: No    • Physically Abused: No    • Sexually Abused: No   Housing Stability: Not on file      Medications and Allergies:     Current Outpatient Medications   Medication Sig Dispense Refill   • acetaminophen (TYLENOL) 500 mg tablet Take 1 tablet (500 mg total) by mouth every 8 (eight) hours as needed for mild pain or headaches 90 tablet 1   • albuterol (PROVENTIL HFA,VENTOLIN HFA) 90 mcg/act inhaler Inhale 2 puffs 2 (two) times a day 8 g 5   • ASPIRIN 81 PO Take by mouth 1  daily      • b complex vitamins capsule Take 1 capsule by mouth daily 30 capsule 6   • benztropine (COGENTIN) 1 mg tablet 1 twice daily and 1 as needed for EPS     • buPROPion (WELLBUTRIN XL) 150 mg 24 hr tablet 1 DAILY     • buPROPion (WELLBUTRIN XL) 300 mg 24 hr tablet Take 300 mg by mouth daily     • Cholecalciferol (VITAMIN D3) 5000 units CAPS Take by mouth 1 daily     • folic acid (FOLVITE) 1 mg tablet Take 1 tablet (1 mg total) by mouth daily 30 tablet 6   • haloperidol (HALDOL) 10 mg tablet 1 hs     • haloperidol (HALDOL) 2 mg tablet 2 daily     • haloperidol (HALDOL) 5 mg tablet 1 in the am     • Lidocaine Viscous HCl (XYLOCAINE) 2 % mucosal solution Swish and spit 15 mL 3 (three) times a day as needed for mouth pain or discomfort 100 mL 3   • lurasidone (LATUDA) 120 mg tablet Take by mouth 1 daily after dinner     • meloxicam (MOBIC) 7 5 mg tablet 1 daily     • metFORMIN (GLUCOPHAGE) 500 mg tablet Take 500 mg by mouth 2 (two) times a day with meals     • Multiple Vitamins-Minerals (MULTIVITAMIN WITH MINERALS) tablet Take 1 tablet by mouth daily     • nicotine polacrilex (COMMIT) 2 MG lozenge Apply 1 lozenge (2 mg total) to the mouth or throat every 3 (three) hours as needed for smoking cessation 168 lozenge 1   • Omega-3 Fatty Acids (fish oil) 1,000 mg Take 1,000 mg by mouth daily     • omeprazole (PriLOSEC) 40 MG capsule Take 1 capsule (40 mg total) by mouth daily 90 capsule 3   • paliperidone (INVEGA) 3 mg 24 hr tablet Take by mouth 1 in the AM     • pravastatin (PRAVACHOL) 40 mg tablet Take 1 tablet (40 mg total) by mouth daily at bedtime 30 tablet 5   • sertraline (ZOLOFT) 100 mg tablet 1 daily     • tamsulosin (FLOMAX) 0 4 mg Take 1 capsule (0 4 mg total) by mouth daily with dinner 90 capsule 3   • Naloxone HCl (NARCAN NA) into each nostril Administer nasally when suspected overdose (Patient not taking: Reported on 2/15/2023)     • polyethylene glycol (MiraLax) 17 GM/SCOOP powder Take 238 g by mouth once for 1 dose Take 238 g by mouth 2 days prior to colonoscopy  Use as directed (Patient not taking: Reported on 5/30/2023) 238 g 0     No current facility-administered medications for this visit  No Known Allergies   Immunizations:     Immunization History   Administered Date(s) Administered   • COVID-19 PFIZER VACCINE 0 3 ML IM 01/27/2021, 02/17/2021, 10/21/2021   • COVID-19 Pfizer Vac BIVALENT Zaki-sucrose 12 Yr+ IM (BOOSTER ONLY) 02/15/2023   • COVID-19 Pfizer vac (Zaki-sucrose, gray cap) 12 yr+ IM 06/15/2022   • H1N1, All Formulations 11/23/2009   • INFLUENZA 11/01/2010, 08/25/2011, 08/10/2012, 10/10/2013, 08/09/2014, 10/15/2014, 09/14/2015, 09/21/2016, 08/03/2017, 09/26/2018, 10/29/2020   • Influenza, Seasonal Vaccine, Quadrivalent, Adjuvanted,   5e 10/12/2022   • Influenza, high dose seasonal 0 7 mL 09/29/2021   • Influenza, injectable, quadrivalent, preservative free 0 5 mL 10/21/2019   • Pneumococcal Conjugate 13-Valent 12/23/2016   • Pneumococcal Conjugate Vaccine 20-valent (Pcv20), Polysace 10/07/2022   • Pneumococcal Polysaccharide PPV23 05/12/2022   • Tdap 08/09/2014, 11/24/2014, 08/06/2021   • Zoster 03/09/2017      Health Maintenance:         Topic Date Due   • Lung Cancer Screening  10/31/2023   • Colorectal Cancer Screening  03/28/2024   • Hepatitis C Screening  Completed         Topic Date Due   • Hepatitis A Vaccine (1 of 2 - Risk 2-dose series) Never done   • Hepatitis B Vaccine (1 of 3 - Risk 3-dose series) Never done      Medicare Screening Tests and Risk Assessments:     Teodoro Kitchen is here for his Subsequent Wellness visit  Last Medicare Wellness visit information reviewed, patient interviewed, no change since last AWV  Health Risk Assessment:   Patient rates overall health as good  Patient feels that their physical health rating is same  Patient is satisfied with their life  Eyesight was rated as same  Hearing was rated as same  Patient feels that their emotional and mental health rating is same  Patients states they are never, rarely angry  Patient states they are never, rarely unusually tired/fatigued  Pain experienced in the last 7 days has been none  Patient states that he has experienced no weight loss or gain in last 6 months  Medications:   Patient is not currently taking any over-the-counter supplements  Patient is not able to manage medications       Advance Care Planning:   Living will: No    Advanced directive counseling given: Yes      PREVENTIVE SCREENINGS      Cardiovascular Screening:    General: Screening Not Indicated, History Lipid Disorder and Risks and Benefits Discussed    Due for: Lipid Panel      Diabetes Screening:     General: Screening Not Indicated, History Diabetes, Risks and Benefits Discussed and Screening Current      Colorectal Cancer Screening: "    General: Screening Current      Prostate Cancer Screening:    General: Risks and Benefits Discussed    Due for: PSA      Abdominal Aortic Aneurysm (AAA) Screening:    Risk factors include: age between 73-69 yo and tobacco use        Lung Cancer Screening:     General: Screening Current and Risks and Benefits Discussed    Due for: Low Dose CT (LDCT)      Hepatitis C Screening:    General: Screening Current    Screening, Brief Intervention, and Referral to Treatment (SBIRT)    Screening  Typical number of drinks in a day: 0  Typical number of drinks in a week: 0  Interpretation: Low risk drinking behavior  Brief Intervention  Alcohol & drug use screenings were reviewed  No concerns regarding substance use disorder identified  Other Counseling Topics:   Regular weightbearing exercise  No results found  Physical Exam:     /68   Pulse 77   Temp (!) 97 3 °F (36 3 °C) (Tympanic)   Resp 16   Ht 6' 4\" (1 93 m)   Wt 71 7 kg (158 lb)   SpO2 98%   BMI 19 23 kg/m²     Physical Exam  Constitutional:       General: He is not in acute distress  Appearance: He is well-developed  He is not toxic-appearing  HENT:      Head: Normocephalic  Right Ear: Tympanic membrane normal  There is no impacted cerumen  Left Ear: Tympanic membrane normal  There is no impacted cerumen  Mouth/Throat:      Comments: I did not see tongue ulcers today  Eyes:      Conjunctiva/sclera: Conjunctivae normal    Cardiovascular:      Rate and Rhythm: Normal rate and regular rhythm  Heart sounds: No murmur heard  Pulmonary:      Effort: No respiratory distress  Breath sounds: No wheezing or rales  Abdominal:      General: Bowel sounds are normal       Palpations: Abdomen is soft  Tenderness: There is no abdominal tenderness  Hernia: A hernia (reducible umbilical hernia was present) is present  Musculoskeletal:         General: No tenderness  Cervical back: Neck supple     Skin:     " General: Skin is warm and dry  Neurological:      Mental Status: He is alert  Mental status is at baseline  Cranial Nerves: No cranial nerve deficit  Motor: No weakness     Psychiatric:         Mood and Affect: Mood normal           Sridhar Chavez MD

## 2023-05-30 NOTE — PATIENT INSTRUCTIONS
Medicare Preventive Visit Patient Instructions  Thank you for completing your Welcome to Medicare Visit or Medicare Annual Wellness Visit today  Your next wellness visit will be due in one year (5/30/2024)  The screening/preventive services that you may require over the next 5-10 years are detailed below  Some tests may not apply to you based off risk factors and/or age  Screening tests ordered at today's visit but not completed yet may show as past due  Also, please note that scanned in results may not display below  Preventive Screenings:  Service Recommendations Previous Testing/Comments   Colorectal Cancer Screening  · Colonoscopy    · Fecal Occult Blood Test (FOBT)/Fecal Immunochemical Test (FIT)  · Fecal DNA/Cologuard Test  · Flexible Sigmoidoscopy Age: 39-70 years old   Colonoscopy: every 10 years (May be performed more frequently if at higher risk)  OR  FOBT/FIT: every 1 year  OR  Cologuard: every 3 years  OR  Sigmoidoscopy: every 5 years  Screening may be recommended earlier than age 39 if at higher risk for colorectal cancer  Also, an individualized decision between you and your healthcare provider will decide whether screening between the ages of 74-80 would be appropriate   Colonoscopy: 03/29/2023  FOBT/FIT: Not on file  Cologuard: Not on file  Sigmoidoscopy: Not on file    Screening Current     Prostate Cancer Screening Individualized decision between patient and health care provider in men between ages of 53-78   Medicare will cover every 12 months beginning on the day after your 50th birthday PSA: No results in last 5 years           Hepatitis C Screening Once for adults born between 1945 and 1965  More frequently in patients at high risk for Hepatitis C Hep C Antibody: 05/13/2022    Screening Current   Diabetes Screening 1-2 times per year if you're at risk for diabetes or have pre-diabetes Fasting glucose: 102 mg/dL (10/17/2022)  A1C: 5 9 % (5/3/2023)  Screening Not Indicated  History Diabetes Cholesterol Screening Once every 5 years if you don't have a lipid disorder  May order more often based on risk factors  Lipid panel: 10/17/2022  Screening Not Indicated  History Lipid Disorder      Other Preventive Screenings Covered by Medicare:  1  Abdominal Aortic Aneurysm (AAA) Screening: covered once if your at risk  You're considered to be at risk if you have a family history of AAA or a male between the age of 73-68 who smoking at least 100 cigarettes in your lifetime  2  Lung Cancer Screening: covers low dose CT scan once per year if you meet all of the following conditions: (1) Age 50-69; (2) No signs or symptoms of lung cancer; (3) Current smoker or have quit smoking within the last 15 years; (4) You have a tobacco smoking history of at least 20 pack years (packs per day x number of years you smoked); (5) You get a written order from a healthcare provider  3  Glaucoma Screening: covered annually if you're considered high risk: (1) You have diabetes OR (2) Family history of glaucoma OR (3)  aged 48 and older OR (3)  American aged 72 and older  3  Osteoporosis Screening: covered every 2 years if you meet one of the following conditions: (1) Have a vertebral abnormality; (2) On glucocorticoid therapy for more than 3 months; (3) Have primary hyperparathyroidism; (4) On osteoporosis medications and need to assess response to drug therapy  5  HIV Screening: covered annually if you're between the age of 12-76  Also covered annually if you are younger than 13 and older than 72 with risk factors for HIV infection  For pregnant patients, it is covered up to 3 times per pregnancy      Immunizations:  Immunization Recommendations   Influenza Vaccine Annual influenza vaccination during flu season is recommended for all persons aged >= 6 months who do not have contraindications   Pneumococcal Vaccine   * Pneumococcal conjugate vaccine = PCV13 (Prevnar 13), PCV15 (Vaxneuvance), PCV20 (Prevnar 20)  * Pneumococcal polysaccharide vaccine = PPSV23 (Pneumovax) Adults 2364 years old: 1-3 doses may be recommended based on certain risk factors  Adults 72 years old: 1-2 doses may be recommended based off what pneumonia vaccine you previously received   Hepatitis B Vaccine 3 dose series if at intermediate or high risk (ex: diabetes, end stage renal disease, liver disease)   Tetanus (Td) Vaccine - COST NOT COVERED BY MEDICARE PART B Following completion of primary series, a booster dose should be given every 10 years to maintain immunity against tetanus  Td may also be given as tetanus wound prophylaxis  Tdap Vaccine - COST NOT COVERED BY MEDICARE PART B Recommended at least once for all adults  For pregnant patients, recommended with each pregnancy  Shingles Vaccine (Shingrix) - COST NOT COVERED BY MEDICARE PART B  2 shot series recommended in those aged 48 and above     Health Maintenance Due:      Topic Date Due   • Lung Cancer Screening  10/31/2023   • Colorectal Cancer Screening  03/28/2024   • Hepatitis C Screening  Completed     Immunizations Due:      Topic Date Due   • Hepatitis A Vaccine (1 of 2 - Risk 2-dose series) Never done   • Hepatitis B Vaccine (1 of 3 - Risk 3-dose series) Never done     Advance Directives   What are advance directives? Advance directives are legal documents that state your wishes and plans for medical care  These plans are made ahead of time in case you lose your ability to make decisions for yourself  Advance directives can apply to any medical decision, such as the treatments you want, and if you want to donate organs  What are the types of advance directives? There are many types of advance directives, and each state has rules about how to use them  You may choose a combination of any of the following:  · Living will: This is a written record of the treatment you want   You can also choose which treatments you do not want, which to limit, and which to stop at a certain time  This includes surgery, medicine, IV fluid, and tube feedings  · Durable power of  for healthcare [facility] [facility]): This is a written record that states who you want to make healthcare choices for you when you are unable to make them for yourself  This person, called a proxy, is usually a family member or a friend  You may choose more than 1 proxy  · Do not resuscitate (DNR) order:  A DNR order is used in case your heart stops beating or you stop breathing  It is a request not to have certain forms of treatment, such as CPR  A DNR order may be included in other types of advance directives  · Medical directive: This covers the care that you want if you are in a coma, near death, or unable to make decisions for yourself  You can list the treatments you want for each condition  Treatment may include pain medicine, surgery, blood transfusions, dialysis, IV or tube feedings, and a ventilator (breathing machine)  · Values history: This document has questions about your views, beliefs, and how you feel and think about life  This information can help others choose the care that you would choose  Why are advance directives important? An advance directive helps you control your care  Although spoken wishes may be used, it is better to have your wishes written down  Spoken wishes can be misunderstood, or not followed  Treatments may be given even if you do not want them  An advance directive may make it easier for your family to make difficult choices about your care  Cigarette Smoking and Your Health   Risks to your health if you smoke:  Nicotine and other chemicals found in tobacco damage every cell in your body  Even if you are a light smoker, you have an increased risk for cancer, heart disease, and lung disease  If you are pregnant or have diabetes, smoking increases your risk for complications     Benefits to your health if you stop smoking:   · You decrease respiratory symptoms such as coughing, wheezing, and shortness of breath  · You reduce your risk for cancers of the lung, mouth, throat, kidney, bladder, pancreas, stomach, and cervix  If you already have cancer, you increase the benefits of chemotherapy  You also reduce your risk for cancer returning or a second cancer from developing  · You reduce your risk for heart disease, blood clots, heart attack, and stroke  · You reduce your risk for lung infections, and diseases such as pneumonia, asthma, chronic bronchitis, and emphysema  · Your circulation improves  More oxygen can be delivered to your body  If you have diabetes, you lower your risk for complications, such as kidney, artery, and eye diseases  You also lower your risk for nerve damage  Nerve damage can lead to amputations, poor vision, and blindness  · You improve your body's ability to heal and to fight infections  For more information and support to stop smoking:   · [place]  Phone: 5- 436 - 629-6091  Web Address: [employer]   [address] Information is for End User's use only and may not be sold, redistributed or otherwise used for commercial purposes  All illustrations and images included in CareNotes® are the copyrighted property of [employer]  or [facility] Preventive Visit Patient Instructions  Thank you for completing your Welcome to Medicare Visit or Medicare Annual Wellness Visit today  Your next wellness visit will be due in one year (5/30/2024)  The screening/preventive services that you may require over the next 5-10 years are detailed below  Some tests may not apply to you based off risk factors and/or age  Screening tests ordered at today's visit but not completed yet may show as past due  Also, please note that scanned in results may not display below    Preventive Screenings:  Service Recommendations Previous Testing/Comments   Colorectal Cancer Screening  · Colonoscopy    · Fecal Occult Blood Test (FOBT)/Fecal Immunochemical Test (FIT)  · Fecal DNA/Cologuard Test  · Flexible Sigmoidoscopy Age: 39-70 years old   Colonoscopy: every 10 years (May be performed more frequently if at higher risk)  OR  FOBT/FIT: every 1 year  OR  Cologuard: every 3 years  OR  Sigmoidoscopy: every 5 years  Screening may be recommended earlier than age 39 if at higher risk for colorectal cancer  Also, an individualized decision between you and your healthcare provider will decide whether screening between the ages of 74-80 would be appropriate  Colonoscopy: 03/29/2023  FOBT/FIT: Not on file  Cologuard: Not on file  Sigmoidoscopy: Not on file    Screening Current     Prostate Cancer Screening Individualized decision between patient and health care provider in men between ages of 53-78   Medicare will cover every 12 months beginning on the day after your 50th birthday PSA: No results in last 5 years           Hepatitis C Screening Once for adults born between 1945 and 1965  More frequently in patients at high risk for Hepatitis C Hep C Antibody: 05/13/2022    Screening Current   Diabetes Screening 1-2 times per year if you're at risk for diabetes or have pre-diabetes Fasting glucose: 102 mg/dL (10/17/2022)  A1C: 5 9 % (5/3/2023)  Screening Not Indicated  History Diabetes   Cholesterol Screening Once every 5 years if you don't have a lipid disorder  May order more often based on risk factors  Lipid panel: 10/17/2022  Screening Not Indicated  History Lipid Disorder      Other Preventive Screenings Covered by Medicare:  6  Abdominal Aortic Aneurysm (AAA) Screening: covered once if your at risk  You're considered to be at risk if you have a family history of AAA or a male between the age of 73-68 who smoking at least 100 cigarettes in your lifetime    7  Lung Cancer Screening: covers low dose CT scan once per year if you meet all of the following conditions: (1) Age 50-69; (2) No signs or symptoms of lung cancer; (3) Current smoker or have quit smoking within the last 15 years; (4) You have a tobacco smoking history of at least 20 pack years (packs per day x number of years you smoked); (5) You get a written order from a healthcare provider  8  Glaucoma Screening: covered annually if you're considered high risk: (1) You have diabetes OR (2) Family history of glaucoma OR (3)  aged 48 and older OR (3)  American aged 72 and older  5  Osteoporosis Screening: covered every 2 years if you meet one of the following conditions: (1) Have a vertebral abnormality; (2) On glucocorticoid therapy for more than 3 months; (3) Have primary hyperparathyroidism; (4) On osteoporosis medications and need to assess response to drug therapy  10  HIV Screening: covered annually if you're between the age of 12-76  Also covered annually if you are younger than 13 and older than 72 with risk factors for HIV infection  For pregnant patients, it is covered up to 3 times per pregnancy  Immunizations:  Immunization Recommendations   Influenza Vaccine Annual influenza vaccination during flu season is recommended for all persons aged >= 6 months who do not have contraindications   Pneumococcal Vaccine   * Pneumococcal conjugate vaccine = PCV13 (Prevnar 13), PCV15 (Vaxneuvance), PCV20 (Prevnar 20)  * Pneumococcal polysaccharide vaccine = PPSV23 (Pneumovax) Adults 25-60 years old: 1-3 doses may be recommended based on certain risk factors  Adults 72 years old: 1-2 doses may be recommended based off what pneumonia vaccine you previously received   Hepatitis B Vaccine 3 dose series if at intermediate or high risk (ex: diabetes, end stage renal disease, liver disease)   Tetanus (Td) Vaccine - COST NOT COVERED BY MEDICARE PART B Following completion of primary series, a booster dose should be given every 10 years to maintain immunity against tetanus  Td may also be given as tetanus wound prophylaxis     Tdap Vaccine - COST NOT COVERED BY MEDICARE PART B Recommended at least once for all adults  For pregnant patients, recommended with each pregnancy  Shingles Vaccine (Shingrix) - COST NOT COVERED BY MEDICARE PART B  2 shot series recommended in those aged 48 and above     Health Maintenance Due:      Topic Date Due   • Lung Cancer Screening  10/31/2023   • Colorectal Cancer Screening  03/28/2024   • Hepatitis C Screening  Completed     Immunizations Due:      Topic Date Due   • Hepatitis A Vaccine (1 of 2 - Risk 2-dose series) Never done   • Hepatitis B Vaccine (1 of 3 - Risk 3-dose series) Never done     Advance Directives   What are advance directives? Advance directives are legal documents that state your wishes and plans for medical care  These plans are made ahead of time in case you lose your ability to make decisions for yourself  Advance directives can apply to any medical decision, such as the treatments you want, and if you want to donate organs  What are the types of advance directives? There are many types of advance directives, and each state has rules about how to use them  You may choose a combination of any of the following:  · Living will: This is a written record of the treatment you want  You can also choose which treatments you do not want, which to limit, and which to stop at a certain time  This includes surgery, medicine, IV fluid, and tube feedings  · Durable power of  for healthcare [place] [facility]): This is a written record that states who you want to make healthcare choices for you when you are unable to make them for yourself  This person, called a proxy, is usually a family member or a friend  You may choose more than 1 proxy  · Do not resuscitate (DNR) order:  A DNR order is used in case your heart stops beating or you stop breathing  It is a request not to have certain forms of treatment, such as CPR  A DNR order may be included in other types of advance directives  · Medical directive:   This covers the care that you want if you are in a coma, near death, or unable to make decisions for yourself  You can list the treatments you want for each condition  Treatment may include pain medicine, surgery, blood transfusions, dialysis, IV or tube feedings, and a ventilator (breathing machine)  · Values history: This document has questions about your views, beliefs, and how you feel and think about life  This information can help others choose the care that you would choose  Why are advance directives important? An advance directive helps you control your care  Although spoken wishes may be used, it is better to have your wishes written down  Spoken wishes can be misunderstood, or not followed  Treatments may be given even if you do not want them  An advance directive may make it easier for your family to make difficult choices about your care  Cigarette Smoking and Your Health   Risks to your health if you smoke:  Nicotine and other chemicals found in tobacco damage every cell in your body  Even if you are a light smoker, you have an increased risk for cancer, heart disease, and lung disease  If you are pregnant or have diabetes, smoking increases your risk for complications  Benefits to your health if you stop smoking:   · You decrease respiratory symptoms such as coughing, wheezing, and shortness of breath  · You reduce your risk for cancers of the lung, mouth, throat, kidney, bladder, pancreas, stomach, and cervix  If you already have cancer, you increase the benefits of chemotherapy  You also reduce your risk for cancer returning or a second cancer from developing  · You reduce your risk for heart disease, blood clots, heart attack, and stroke  · You reduce your risk for lung infections, and diseases such as pneumonia, asthma, chronic bronchitis, and emphysema  · Your circulation improves  More oxygen can be delivered to your body  If you have diabetes, you lower your risk for complications, such as kidney, artery, and eye diseases   You also lower your risk for nerve damage  Nerve damage can lead to amputations, poor vision, and blindness  · You improve your body's ability to heal and to fight infections  For more information and support to stop smoking:   · [employer]  gov  Phone: 8- 328 - 822-6757  Web Address: [employer]   [address] Information is for End User's use only and may not be sold, redistributed or otherwise used for commercial purposes   All illustrations and images included in CareNotes® are the copyrighted property of [employer]  or [address] [place] [address]

## 2023-05-30 NOTE — PROGRESS NOTES
I discussed with him that he is a candidate for lung cancer CT screening  The following Shared Decision-Making points were covered:  Benefits of screening were discussed, including the rates of reduction in death from lung cancer and other causes  Harms of screening were reviewed, including false positive tests, radiation exposure levels, risks of invasive procedures, risks of complications of screening, and risk of overdiagnosis  I counseled on the importance of adherence to annual lung cancer LDCT screening, impact of co-morbidities, and ability or willingness to undergo diagnosis and treatment  I counseled on the importance of maintaining abstinence as a former smoker or was counseled on the importance of smoking cessation if a current smoker    Review of Eligibility Criteria: He meets all of the criteria for Lung Cancer Screening  He is 78 y o  He has 20 pack year tobacco history and is a current smoker or has quit within the past 15 years  He presents no signs or symptoms of lung cancer    After discussion, the patient decided to elect lung cancer screening

## 2023-06-14 ENCOUNTER — TELEPHONE (OUTPATIENT)
Dept: GASTROENTEROLOGY | Facility: CLINIC | Age: 68
End: 2023-06-14

## 2023-06-14 NOTE — TELEPHONE ENCOUNTER
Patients GI provider:  Dr Eb Bowman    Number to return call: [phone]    Reason for call: Bubba Luna from the facility called stating they never received the pt's results from his colonoscopy   She is requesting they be faxed to [phone]    Scheduled procedure/appointment date if applicable: Appt 8/05/99

## 2023-06-28 ENCOUNTER — OFFICE VISIT (OUTPATIENT)
Dept: GASTROENTEROLOGY | Facility: CLINIC | Age: 68
End: 2023-06-28
Payer: MEDICARE

## 2023-06-28 ENCOUNTER — TRANSCRIBE ORDERS (OUTPATIENT)
Dept: GASTROENTEROLOGY | Facility: CLINIC | Age: 68
End: 2023-06-28

## 2023-06-28 VITALS
TEMPERATURE: 98.1 F | HEIGHT: 76 IN | BODY MASS INDEX: 19.61 KG/M2 | DIASTOLIC BLOOD PRESSURE: 80 MMHG | WEIGHT: 161 LBS | SYSTOLIC BLOOD PRESSURE: 120 MMHG

## 2023-06-28 DIAGNOSIS — R11.0 NAUSEA: ICD-10-CM

## 2023-06-28 DIAGNOSIS — K59.00 CONSTIPATION, UNSPECIFIED CONSTIPATION TYPE: Primary | ICD-10-CM

## 2023-06-28 DIAGNOSIS — Z86.010 PERSONAL HISTORY OF COLONIC POLYPS: ICD-10-CM

## 2023-06-28 RX ORDER — POLYETHYLENE GLYCOL 3350 17 G/17G
17 POWDER, FOR SOLUTION ORAL DAILY
Qty: 14 EACH | Refills: 3 | Status: SHIPPED | OUTPATIENT
Start: 2023-06-28 | End: 2023-08-27

## 2023-06-28 NOTE — PROGRESS NOTES
[facility] - Outpatient Follow-up Note  Claudette Perry 80 y o  male MRN: [id number]  Encounter: [id number]          ASSESSMENT AND PLAN:    Claudette Perry is a 80 y o  male with hx of hx of schizophrenia, umbilical hernia, HLD, COPD, tobacco use disorder, prior colon polyps presenting for follow up  Nausea, Emesis  - Resolved  - Likely was 2/2 severe stool burden noted on previous CT  - Started pt on constipation regimen to prevent recurrent stool build up    Constipation  - Started miralax 17 g daily  - Encouraged adequate hydration, fiber intake    CRC Screening  - Personal hx of colon polyps  - Inadequate prep on 2020 and 2023 colonoscopies  - Recall colonoscopy in 1 year with 2 day prep    1  Constipation, unspecified constipation type    2  Nausea    3  Personal history of colonic polyps        No orders of the defined types were placed in this encounter     ______________________________________________________________________    SUBJECTIVE:    Claudette Perry is a 80 y o  male with hx of schizophrenia, umbilical hernia, HLD, COPD, tobacco use disorder, prior colon polyps presenting for follow up  Seen 2/15/23 for nausea, emesis  At that time recommended for EGD, colonoscopy, CT scan  Subsequently went to ED on 2/23/23 for worsening symptoms  CT scan showed heavy stool burden  Had colonoscopy and EGD w  Dr Saundra Izaguirre on 3/29/23  EGD showed gastric erythema, hiatal hernia, Grade B esophagitis  Colonoscopy with poor bowel prep throughout  Recommended for 2 day prep and recall colonoscopy in 1 year  Also with nonhealing tongue ulcer, following with ENT  Today reporting nausea has resolved  Discussed importance of adequate prep for his next colonoscopy with pt and caregiver  Tried calling patient's sister Ulices Wick ([phone]) without answer to obtain additional family history regarding CRC or pt's personal hx of colon polyps       Given pt has hx of colon polyps reported, no alternative screening test indicated besides colonoscopy  REVIEW OF SYSTEMS IS OTHERWISE NEGATIVE  Historical Information   Past Medical History:   Diagnosis Date   • Atopic rhinitis    • BPH with urinary obstruction    • COPD (chronic obstructive pulmonary disease) (HCC)    • Hyperlipidemia    • Incomplete bladder emptying    • Schizophrenia ([facility] [address] )    • Urinary retention      Past Surgical History:   Procedure Laterality Date   • RIB FRACTURE SURGERY       Social History   Social History     Substance and Sexual Activity   Alcohol Use No    Comment: hx  of hard liquor, regular use, quit at age 33       Social History     Substance and Sexual Activity   Drug Use No     Social History     Tobacco Use   Smoking Status Every Day   • Packs/day: 1 00   • Years: 45 00   • Total pack years: 45 00   • Types: Cigarettes   Smokeless Tobacco Never     Family History   Problem Relation Age of Onset   • Heart attack Father    • Dementia Mother    • Substance Abuse Sister         cigarettes   • Substance Abuse Brother    • Alcohol abuse Brother        Meds/Allergies       Current Outpatient Medications:   •  acetaminophen (TYLENOL) 500 mg tablet  •  albuterol (PROVENTIL HFA,VENTOLIN HFA) 90 mcg/act inhaler  •  ASPIRIN 81 PO  •  b complex vitamins capsule  •  benztropine (COGENTIN) 1 mg tablet  •  buPROPion (WELLBUTRIN XL) 150 mg 24 hr tablet  •  buPROPion (WELLBUTRIN XL) 300 mg 24 hr tablet  •  Cholecalciferol (VITAMIN D3) 5000 units CAPS  •  folic acid (FOLVITE) 1 mg tablet  •  haloperidol (HALDOL) 10 mg tablet  •  haloperidol (HALDOL) 2 mg tablet  •  haloperidol (HALDOL) 5 mg tablet  •  Lidocaine Viscous HCl (XYLOCAINE) 2 % mucosal solution  •  lurasidone (LATUDA) 120 mg tablet  •  meloxicam (MOBIC) 7 5 mg tablet  •  metFORMIN (GLUCOPHAGE) 500 mg tablet  •  Multiple Vitamins-Minerals (MULTIVITAMIN WITH MINERALS) tablet  •  nicotine polacrilex (COMMIT) 2 MG lozenge  •  Omega-3 Fatty Acids (fish oil) 1,000 mg  • "omeprazole (PriLOSEC) 40 MG capsule  •  paliperidone (INVEGA) 3 mg 24 hr tablet  •  pravastatin (PRAVACHOL) 40 mg tablet  •  sertraline (ZOLOFT) 100 mg tablet  •  tamsulosin (FLOMAX) 0 4 mg  •  Naloxone HCl (NARCAN NA)  •  polyethylene glycol (MiraLax) 17 GM/SCOOP powder    No Known Allergies        Objective     Blood pressure 120/80, temperature 98 1 °F (36 7 °C), temperature source Tympanic, height 6' 4\" (1 93 m), weight 73 kg (161 lb)  Body mass index is 19 6 kg/m²  Wt Readings from Last 3 Encounters:   06/28/23 73 kg (161 lb)   05/30/23 71 7 kg (158 lb)   04/07/23 73 kg (161 lb)        PHYSICAL EXAM:      General Appearance:   Alert, cooperative, no distress   HEENT:   Normocephalic, atraumatic, anicteric  Neck:  Supple, symmetrical, trachea midline   Lungs:   Clear to auscultation bilaterally; no rales, rhonchi or wheezing; respirations unlabored    Heart[de-identified]   Regular rate and rhythm; no murmur, rub, or gallop     Abdomen:   Soft, non-tender, non-distended; normal bowel sounds; no masses, no organomegaly    Genitalia:   Deferred    Rectal:   Deferred    Extremities:  No cyanosis, clubbing or edema    Pulses:  2+ and symmetric    Skin:  No jaundice, rashes, or lesions    Lymph nodes:  No palpable cervical lymphadenopathy        Lab Results:       Lab Units 02/23/23  0926 12/27/22  1908 10/17/22  1105   SODIUM mmol/L 136 138 135   POTASSIUM mmol/L 4 1 4 2 4 1   CHLORIDE mmol/L 104 102 100   CO2 mmol/L 26 29 29   BUN mg/dL 14 11 13   CREATININE mg/dL 0 79 0 89 0 93   GLUCOSE RANDOM mg/dL 104 96  --    CALCIUM mg/dL 9 3 9 2 9 9            Lab Units 02/23/23  0926 12/27/22  1908 10/17/22  1105   TOTAL PROTEIN g/dL 6 9 7 5 7 2   ALBUMIN g/dL 4 1 3 9 3 8   TOTAL BILIRUBIN mg/dL 0 35 0 20 0 40   AST U/L 18 24 18   ALT U/L 20 32 30   ALK PHOS U/L 50 69 71           Lab Units 02/23/23 0926 12/27/22 1908   WBC Thousand/uL 7 38 7 66   HEMOGLOBIN g/dL 14 0 14 2   HEMATOCRIT % 40 7 41 8   PLATELETS Thousands/uL 298 " "300   MCV fL 96 96   MCH pg 32 9 32 6   MCHC g/dL 34 4 34 0   RDW % 12 8 11 9   MPV fL 9 7 10 0   NRBC AUTO /100 WBCs 0 0   NEUTROS PCT % 44 49   IMMATURE GRANULOCYTES % % 0 0   LYMPHS PCT % 36 34   MONOS PCT % 11 9   EOS PCT % 8* 7*   BASOS PCT % 1 1   NEUTROS ABS Thousands/µL 3 22 3 81   IMMATURE GRANULOCYES ABS Thousand/uL 0 03 0 02   LYMPHS ABS Thousands/µL 2 67 2 60   MONOS ABS Thousand/µL 0 83 0 65   EOS ABS Thousand/µL 0 57 0 50   BASOS ABS Thousands/µL 0 06 0 08       No results for input(s): \"IRON\", \"TRANSFERRIN\", \"TRANSFERSAT\", \"FERRITIN\", \"RETIC\" in the last [id number] hours  No results found for: \"CRP\"    No results found for: \"TMX9NIIPCOKR\", \"TSH\"    Radiology Results:   No results found      Michelle Woods MD  PGY-4 Gastroenterology Fellow  6/28/2023 8:50 AM    "

## 2023-07-26 ENCOUNTER — EVALUATION (OUTPATIENT)
Dept: PHYSICAL THERAPY | Facility: CLINIC | Age: 68
End: 2023-07-26
Payer: MEDICARE

## 2023-07-26 DIAGNOSIS — M19.011 ARTHRITIS OF RIGHT SHOULDER REGION: Primary | ICD-10-CM

## 2023-07-26 PROCEDURE — 97161 PT EVAL LOW COMPLEX 20 MIN: CPT | Performed by: PHYSICAL THERAPIST

## 2023-07-26 PROCEDURE — 97110 THERAPEUTIC EXERCISES: CPT | Performed by: PHYSICAL THERAPIST

## 2023-07-26 NOTE — LETTER
2023    Jess Stein, [address]  [facility] [address]    Patient: Jose Moya   YOB: 1955   Date of Visit: 2023     Encounter Diagnosis     ICD-10-CM    1. Arthritis of right shoulder region  M19.011           Dear Dr. Charles Boles:    Thank you for your recent referral of Jose Moya. Please review the attached evaluation summary from Bryce's recent visit. Please verify that you agree with the plan of care by signing the attached order. If you have any questions or concerns, please do not hesitate to call. I sincerely appreciate the opportunity to share in the care of one of your patients and hope to have another opportunity to work with you in the near future. Sincerely,    Noel Lawson, PT      Referring Provider:      I certify that I have read the below Plan of Care and certify the need for these services furnished under this plan of treatment while under my care. Jess Stein MD  [address] [place] [id number]  Via Fax: [phone]          PT Evaluation     Today's date: 2023  Patient name: Jose Moya  : 1955  MRN: [id number]  Referring provider: Jess Stein MD  Dx:   Encounter Diagnosis     ICD-10-CM    1. Arthritis of right shoulder region  M19.011                      Assessment  Assessment details: Jose Moya is a 80 y.o. male who presents with pain, decreased strength, and decreased ROM. Due to these impairments, patient has difficulty performing a/iadls and recreational activities. Patient's clinical presentation is consistent with their referring diagnosis of arthritis of right shoulder region. Patient would benefit from skilled physical therapy to address their aforementioned impairments, improve their level of function and to improve their overall quality of life.   Impairments: abnormal or restricted ROM, impaired physical strength and pain with function  Understanding of Dx/Px/POC: good Prognosis: good    Goals  Short term goals - to be achieved in 4 weeks:    Decrease pain 20-50%. Increase strength by 1/2 grade. Improve range of motion by 25%. Long term goals - to be achieved by discharge:    Overhead reaching is improved to maximal level of function. Lifting is improved to maximal level of function. IADL performance in related activities is improved to maximal level of function. Performance in related household activities is improved to maximal level of function. Plan  Planned therapy interventions: manual therapy, neuromuscular re-education, patient education, strengthening, stretching, therapeutic activities, therapeutic exercise and home exercise program  Frequency: 2x week  Duration in visits: 12  Duration in weeks: 6  Plan of Care beginning date: 2023  Plan of Care expiration date: 2023  Treatment plan discussed with: patient        Subjective Evaluation    History of Present Illness  Mechanism of injury: Patient refers to PT with c/o pain in his right shoulder which began approximately six months previous attributed to crushing cans. Patient received X-rays of his right shoulder in 10/2022 which revealed mild glenohumeral and acromioclavicular osteoarthritis. Patient c/o pain when reaching behind his back and pain with heavy lifting activities. Patient denies numbness and tingling in his right UE. Patient is retired. Patient received prior PT for his right shoulder in this office in 2022 to 10/2022; states improvement in his his shoulder with prior PT treatment.     Patient Goals  Patient goals for therapy: decreased pain    Pain  Current pain ratin  At best pain ratin  At worst pain ratin  Aggravating factors: lifting (Reaching behind back)          Objective     Active Range of Motion     Right Shoulder   Flexion: 150 degrees   Abduction: 150 degrees   External rotation BTH: T2   Internal rotation BTB: T9     Passive Range of Motion Right Shoulder   Flexion: 165 degrees   Abduction: 165 degrees   External rotation 90°: 85 degrees   Internal rotation 90°: 75 degrees     Strength/Myotome Testing     Right Shoulder     Planes of Motion   Flexion: 3+   Abduction: 3+   External rotation at 0°: 4-   Internal rotation at 0°: 4             Precautions: DM 2; Schitzophrenia; depression      Manuals 7/26            Right shoulder PROM                                                    Neuro Re-Ed             Prone I's             Prone T's              Prone Y's             Supine Serratus punch                                                    Ther Ex             UBE             TB ER HEP            TB IR HEP            TB shld ext HEP            TB rows             TB Biceps             TB Triceps                          Ther Activity                                       Gait Training                                       Modalities                                        HEP access code: [email or web address]

## 2023-07-26 NOTE — PROGRESS NOTES
PT Evaluation     Today's date: 2023  Patient name: Mary Arriaga  : 1955  MRN: [id number]  Referring provider: Dion Espinosa MD  Dx:   Encounter Diagnosis     ICD-10-CM    1. Arthritis of right shoulder region  M19.011                      Assessment  Assessment details: Mary Arriaga is a 80 y.o. male who presents with pain, decreased strength, and decreased ROM. Due to these impairments, patient has difficulty performing a/iadls and recreational activities. Patient's clinical presentation is consistent with their referring diagnosis of arthritis of right shoulder region. Patient would benefit from skilled physical therapy to address their aforementioned impairments, improve their level of function and to improve their overall quality of life. Impairments: abnormal or restricted ROM, impaired physical strength and pain with function  Understanding of Dx/Px/POC: good   Prognosis: good    Goals  Short term goals - to be achieved in 4 weeks:    Decrease pain 20-50%. Increase strength by 1/2 grade. Improve range of motion by 25%. Long term goals - to be achieved by discharge:    Overhead reaching is improved to maximal level of function. Lifting is improved to maximal level of function. IADL performance in related activities is improved to maximal level of function. Performance in related household activities is improved to maximal level of function.      Plan  Planned therapy interventions: manual therapy, neuromuscular re-education, patient education, strengthening, stretching, therapeutic activities, therapeutic exercise and home exercise program  Frequency: 2x week  Duration in visits: 12  Duration in weeks: 6  Plan of Care beginning date: 2023  Plan of Care expiration date: 2023  Treatment plan discussed with: patient        Subjective Evaluation    History of Present Illness  Mechanism of injury: Patient refers to PT with c/o pain in his right shoulder which began approximately six months previous attributed to crushing cans. Patient received X-rays of his right shoulder in 10/2022 which revealed mild glenohumeral and acromioclavicular osteoarthritis. Patient c/o pain when reaching behind his back and pain with heavy lifting activities. Patient denies numbness and tingling in his right UE. Patient is retired. Patient received prior PT for his right shoulder in this office in 2022 to 10/2022; states improvement in his his shoulder with prior PT treatment.     Patient Goals  Patient goals for therapy: decreased pain    Pain  Current pain ratin  At best pain ratin  At worst pain ratin  Aggravating factors: lifting (Reaching behind back)          Objective     Active Range of Motion     Right Shoulder   Flexion: 150 degrees   Abduction: 150 degrees   External rotation BTH: T2   Internal rotation BTB: T9     Passive Range of Motion     Right Shoulder   Flexion: 165 degrees   Abduction: 165 degrees   External rotation 90°: 85 degrees   Internal rotation 90°: 75 degrees     Strength/Myotome Testing     Right Shoulder     Planes of Motion   Flexion: 3+   Abduction: 3+   External rotation at 0°: 4-   Internal rotation at 0°: 4              Precautions: DM 2; Schitzophrenia; depression      Manuals             Right shoulder PROM                                                    Neuro Re-Ed             Prone I's             Prone T's              Prone Y's             Supine Serratus punch                                                    Ther Ex             UBE             TB ER HEP            TB IR HEP            TB shld ext HEP            TB rows             TB Biceps             TB Triceps                          Ther Activity                                       Gait Training                                       Modalities                                         HEP access code: [email or web address]

## 2023-08-02 ENCOUNTER — TELEPHONE (OUTPATIENT)
Dept: FAMILY MEDICINE CLINIC | Facility: HOSPITAL | Age: 68
End: 2023-08-02

## 2023-08-02 DIAGNOSIS — M79.10 MUSCLE PAIN: Primary | ICD-10-CM

## 2023-08-02 RX ORDER — MENTHOL 1.4 %
ADHESIVE PATCH, MEDICATED TOPICAL 4 TIMES DAILY PRN
Qty: 30 G | Refills: 3 | Status: SHIPPED | OUTPATIENT
Start: 2023-08-02

## 2023-08-02 NOTE — TELEPHONE ENCOUNTER
Facility asking for an order for prn ultra strength [facility] for muscle pain.     pls fax to [phone]

## 2023-08-03 ENCOUNTER — HOSPITAL ENCOUNTER (OUTPATIENT)
Dept: RADIOLOGY | Facility: HOSPITAL | Age: 68
End: 2023-08-03
Payer: MEDICARE

## 2023-08-03 ENCOUNTER — HOSPITAL ENCOUNTER (OUTPATIENT)
Dept: MRI IMAGING | Facility: HOSPITAL | Age: 68
End: 2023-08-03
Payer: MEDICARE

## 2023-08-03 DIAGNOSIS — M75.101 ROTATOR CUFF SYNDROME OF RIGHT SHOULDER: ICD-10-CM

## 2023-08-03 PROCEDURE — 73221 MRI JOINT UPR EXTREM W/O DYE: CPT

## 2023-08-03 PROCEDURE — G1004 CDSM NDSC: HCPCS

## 2023-08-08 ENCOUNTER — APPOINTMENT (OUTPATIENT)
Dept: PHYSICAL THERAPY | Facility: CLINIC | Age: 68
End: 2023-08-08
Payer: MEDICARE

## 2023-08-10 ENCOUNTER — APPOINTMENT (OUTPATIENT)
Dept: PHYSICAL THERAPY | Facility: CLINIC | Age: 68
End: 2023-08-10
Payer: MEDICARE

## 2023-08-15 ENCOUNTER — OFFICE VISIT (OUTPATIENT)
Dept: PHYSICAL THERAPY | Facility: CLINIC | Age: 68
End: 2023-08-15
Payer: MEDICARE

## 2023-08-15 DIAGNOSIS — M19.011 ARTHRITIS OF RIGHT SHOULDER REGION: Primary | ICD-10-CM

## 2023-08-15 PROCEDURE — 97140 MANUAL THERAPY 1/> REGIONS: CPT

## 2023-08-15 PROCEDURE — 97110 THERAPEUTIC EXERCISES: CPT

## 2023-08-15 NOTE — PROGRESS NOTES
Daily Note     Today's date: 8/15/2023  Patient name: Chantale Dolan  : 1955  MRN: [id number]  Referring provider: Juan Lorenzo MD  Dx:   Encounter Diagnosis     ICD-10-CM    1. Arthritis of right shoulder region  M19.011                      Subjective: Pt reports that his shoulder is doing much better and he was able to swim this morning. Objective: See treatment diary below      Assessment: Initiated PT POC today. Pt doing much better and has returned to swimming. He was able to perform all TE's with no pain. Showed good motion with manual PROM. Only minor discomfort with full passive flexion. Tolerated treatment well. Patient demonstrated fatigue post treatment, exhibited good technique with therapeutic exercises and would benefit from continued PT      Plan: Continue per plan of care. Progress treatment as tolerated.        Precautions: DM 2; Schitzophrenia; depression      Manuals 7/26 8/15           Right shoulder PROM  WE                                                  Neuro Re-Ed             Prone I's  x10           Prone T's   x10           Prone Y's  x10           Supine Serratus punch  2#  x10                                                  Ther Ex             UBE  2'/2'           TB ER HEP GTB  x20           TB IR HEP GTB  x20           TB shld ext HEP BTB  x20           TB rows  BTB  x20           TB Biceps  BTB  x20           TB Triceps  BTB  x20                        Ther Activity                                       Gait Training                                       Modalities

## 2023-08-17 ENCOUNTER — OFFICE VISIT (OUTPATIENT)
Dept: PHYSICAL THERAPY | Facility: CLINIC | Age: 68
End: 2023-08-17
Payer: MEDICARE

## 2023-08-17 DIAGNOSIS — M19.011 ARTHRITIS OF RIGHT SHOULDER REGION: Primary | ICD-10-CM

## 2023-08-17 PROCEDURE — 97110 THERAPEUTIC EXERCISES: CPT

## 2023-08-17 PROCEDURE — 97140 MANUAL THERAPY 1/> REGIONS: CPT

## 2023-08-17 NOTE — PROGRESS NOTES
Daily Note     Today's date: 2023  Patient name: Justin Mcnally  : 1955  MRN: [id number]  Referring provider: Kelly Pearson MD  Dx:   Encounter Diagnosis     ICD-10-CM    1. Arthritis of right shoulder region  M19.011                      Subjective: Pt reports that his shoulder is doing much better and he was able to swim this morning. Objective: See treatment diary below      Assessment: Pt continues to improve functionally with his shoulder. Feeling good swimming and taking out the trash for all of the residents at his house. Tenderness noted in R UT with relief noted post STM. Tolerated treatment well. Patient demonstrated fatigue post treatment, exhibited good technique with therapeutic exercises and would benefit from continued PT      Plan: Continue per plan of care. Progress treatment as tolerated.        Precautions: DM 2; Schitzophrenia; depression      Manuals 7/26 8/15 8/17          Right shoulder PROM  WE WE          R UT STM   WE                                    Neuro Re-Ed             Prone I's  x10 x10          Prone T's   x10 x10          Prone Y's  x10 x10          Supine Serratus punch  2#  x10 2#  10x2                                                 Ther Ex             UBE  2'/2' 3'/3'          TB ER HEP GTB  x20 GTB  x20          TB IR HEP GTB  x20 GTB  x20          TB shld ext HEP BTB  x20 BTB  x20          TB rows  BTB  x20 BTB  x20          TB Biceps  BTB  x20           TB Triceps  BTB  x20                        Ther Activity                                       Gait Training                                       Modalities

## 2023-08-22 ENCOUNTER — OFFICE VISIT (OUTPATIENT)
Dept: PHYSICAL THERAPY | Facility: CLINIC | Age: 68
End: 2023-08-22
Payer: MEDICARE

## 2023-08-22 DIAGNOSIS — M19.011 ARTHRITIS OF RIGHT SHOULDER REGION: Primary | ICD-10-CM

## 2023-08-22 PROCEDURE — 97140 MANUAL THERAPY 1/> REGIONS: CPT

## 2023-08-22 PROCEDURE — 97112 NEUROMUSCULAR REEDUCATION: CPT

## 2023-08-22 PROCEDURE — 97110 THERAPEUTIC EXERCISES: CPT

## 2023-08-22 NOTE — PROGRESS NOTES
Daily Note     Today's date: 2023  Patient name: Vianca Hall  : 1955  MRN: [id number]  Referring provider: Mir Weeks MD  Dx:   Encounter Diagnosis     ICD-10-CM    1. Arthritis of right shoulder region  M19.011                      Subjective: Pt reports he continues to feel his shoulder is improving, he notes swimming has been good but tried to do weight in the pool which then  Bothered him some. Objective: See treatment diary below      Assessment: Pt continues to improve functionally with his shoulder. Still Feeling good swimming and taking out the trash for all of the residents at his house. Tenderness improved in R UT. Added more functional shoulder movements and added resistance where able. Tolerated treatment well. Patient demonstrated fatigue post treatment, exhibited good technique with therapeutic exercises and would benefit from continued PT      Plan: Continue per plan of care. Progress treatment as tolerated.        Precautions: DM 2; Schitzophrenia; depression      Manuals 7/26 8/15 8/17 8/22         Right shoulder PROM  WE WE WE         R UT STM   WE WE                                   Neuro Re-Ed             Prone I's  x10 x10 x10         Prone T's   x10 x10 x10         Prone Y's  x10 x10 x10         Supine Serratus punch  2#  x10 2#  10x2                                                 Ther Ex             UBE  2'/2' 3'/3' 3'/3'         TB ER HEP GTB  x20 GTB  x20 GTB  x20         TB IR HEP GTB  x20 GTB  x20 GTB  x20         TB shld ext HEP BTB  x20 BTB  x20 BTB  x20         TB rows  BTB  x20 BTB  x20 BTB  x20         TB Biceps  BTB  x20  BTB  x20         TB Triceps  BTB  x20  BTB  x20         TB Horz ABD    GTB  x20         Supine Flex    x10         SL ER    x10         SL ABD    x10         Ther Activity                                       Gait Training                                       Modalities

## 2023-08-24 ENCOUNTER — OFFICE VISIT (OUTPATIENT)
Dept: PHYSICAL THERAPY | Facility: CLINIC | Age: 68
End: 2023-08-24
Payer: MEDICARE

## 2023-08-24 DIAGNOSIS — M19.011 ARTHRITIS OF RIGHT SHOULDER REGION: Primary | ICD-10-CM

## 2023-08-24 PROCEDURE — 97110 THERAPEUTIC EXERCISES: CPT

## 2023-08-24 PROCEDURE — 97140 MANUAL THERAPY 1/> REGIONS: CPT

## 2023-08-24 NOTE — PROGRESS NOTES
Daily Note     Today's date: 2023  Patient name: Chata Vasquez  : 1955  MRN: [id number]  Referring provider: Sandra Grijalva MD  Dx:   Encounter Diagnosis     ICD-10-CM    1. Arthritis of right shoulder region  M19.011                      Subjective: Pt reports he continues to feel his shoulder is improving. Objective: See treatment diary below      Assessment: Pt continues to improve. Today he noted he didn't get much sleep last night and is fatiguing much quicker as a result. He did not want to finish his entire program today. Tolerated treatment well. Patient demonstrated fatigue post treatment, exhibited good technique with therapeutic exercises and would benefit from continued PT      Plan: Continue per plan of care. Progress treatment as tolerated.        Precautions: DM 2; Schitzophrenia; depression      Manuals 7/26 8/15 8/17 8/22 8/24        Right shoulder PROM  WE WE WE WE        R UT STM   WE WE WE                                  Neuro Re-Ed             Prone I's  x10 x10 x10         Prone T's   x10 x10 x10         Prone Y's  x10 x10 x10         Supine Serratus punch  2#  x10 2#  10x2                                                 Ther Ex             UBE  2'/2' 3'/3' 3'/3' 3'/3'        TB ER HEP GTB  x20 GTB  x20 GTB  x20         TB IR HEP GTB  x20 GTB  x20 GTB  x20         TB shld ext HEP BTB  x20 BTB  x20 BTB  x20 BTB  x20        TB rows  BTB  x20 BTB  x20 BTB  x20 BTB  x20        TB Biceps  BTB  x20  BTB  x20 BTB  x20        TB Triceps  BTB  x20  BTB  x20 BTB  x20        TB Horz ABD    GTB  x20 GTB  x20        Supine Flex    x10 2# x10        SL ER    x10 2# x10        SL ABD    x10 2# x10        Ther Activity                                       Gait Training                                       Modalities

## 2023-08-31 ENCOUNTER — APPOINTMENT (OUTPATIENT)
Dept: PHYSICAL THERAPY | Facility: CLINIC | Age: 68
End: 2023-08-31
Payer: MEDICARE

## 2023-09-11 ENCOUNTER — OFFICE VISIT (OUTPATIENT)
Dept: PHYSICAL THERAPY | Facility: CLINIC | Age: 68
End: 2023-09-11
Payer: MEDICARE

## 2023-09-11 DIAGNOSIS — M19.011 ARTHRITIS OF RIGHT SHOULDER REGION: Primary | ICD-10-CM

## 2023-09-11 PROCEDURE — 97110 THERAPEUTIC EXERCISES: CPT

## 2023-09-11 PROCEDURE — 97140 MANUAL THERAPY 1/> REGIONS: CPT

## 2023-09-11 NOTE — PROGRESS NOTES
Daily Note     Today's date: 2023  Patient name: Hanny Antunez  : 1955  MRN: [id number]  Referring provider: Loyd Almodovar MD  Dx:   Encounter Diagnosis     ICD-10-CM    1. Arthritis of right shoulder region  M19.011                      Subjective: Pt reports he continues to feel his shoulder is improving. Objective: See treatment diary below      Assessment: Pt continues to show functional  improvement. Still has some noted weakness in abduction and fatigues quickly/ Pain levels are down. Tolerated treatment well. Patient demonstrated fatigue post treatment, exhibited good technique with therapeutic exercises and would benefit from continued PT      Plan:  To Re-assess NV     Precautions: DM 2; Schitzophrenia; depression      Manuals 7/26 8/15 8/17 8/22 8/24 9/11       Right shoulder PROM  WE WE WE WE WE       R UT STM   WE WE WE WE                                 Neuro Re-Ed             Prone I's  x10 x10 x10  10       Prone T's   x10 x10 x10  10       Prone Y's  x10 x10 x10  10       Supine Serratus punch  2#  x10 2#  10x2                                                 Ther Ex             UBE  2'/2' 3'/3' 3'/3' 3'/3' 3'/3'       TB ER HEP GTB  x20 GTB  x20 GTB  x20         TB IR HEP GTB  x20 GTB  x20 GTB  x20         TB shld ext HEP BTB  x20 BTB  x20 BTB  x20 BTB  x20 BTB  x20       TB rows  BTB  x20 BTB  x20 BTB  x20 BTB  x20 BTB  x20       TB Biceps  BTB  x20  BTB  x20 BTB  x20 BTB  x20       TB Triceps  BTB  x20  BTB  x20 BTB  x20 BTB  x20       TB Horz ABD    GTB  x20 GTB  x20 GTB  x20       Supine Flex    x10 2# x10 3# x10       SL ER    x10 2# x10 3# x10       SL ABD    x10 2# x10 2# x10       Std Abd Circles      1# x20 ea way       Bent over Rows      7.5#  x30       Ther Activity                                       Gait Training                                       Modalities

## 2023-09-15 ENCOUNTER — TELEPHONE (OUTPATIENT)
Dept: FAMILY MEDICINE CLINIC | Facility: HOSPITAL | Age: 68
End: 2023-09-15

## 2023-09-15 NOTE — TELEPHONE ENCOUNTER
Annia Andujar is asking for a standing order for a urinalysis - Nando Parish suffers from frequent uti's and they can not test him at home without this order    Fax to:  [phone]

## 2023-09-25 ENCOUNTER — OFFICE VISIT (OUTPATIENT)
Dept: PHYSICAL THERAPY | Facility: CLINIC | Age: 68
End: 2023-09-25
Payer: MEDICARE

## 2023-09-25 DIAGNOSIS — M19.011 ARTHRITIS OF RIGHT SHOULDER REGION: Primary | ICD-10-CM

## 2023-09-25 PROCEDURE — 97140 MANUAL THERAPY 1/> REGIONS: CPT

## 2023-09-25 PROCEDURE — 97110 THERAPEUTIC EXERCISES: CPT

## 2023-09-25 NOTE — PROGRESS NOTES
Daily Note     Today's date: 2023  Patient name: Caitlin Fitch  : 1955  MRN: [id number]  Referring provider: Jesus Nichole MD  Dx:   Encounter Diagnosis     ICD-10-CM    1. Arthritis of right shoulder region  M19.011                      Subjective: Pt reports he continues to feel his shoulder is improving. Objective: See treatment diary below      Assessment: Pt shoulder strength and stability continue to improve. Still has some weakness with ABD and ER but no longer has pain with exercises. Tolerated treatment well. Patient demonstrated fatigue post treatment, exhibited good technique with therapeutic exercises and would benefit from continued PT      Plan:  To Re-assess NV     Precautions: DM 2; Schitzophrenia; depression      Manuals 7/26 8/15 8/17 8/22 8/24 9/11 9/25      Right shoulder PROM  WE WE WE WE WE WE      R UT STM   WE WE WE WE WE                                Neuro Re-Ed             Prone I's  x10 x10 x10  10       Prone T's   x10 x10 x10  10       Prone Y's  x10 x10 x10  10       Supine Serratus punch  2#  x10 2#  10x2                                                 Ther Ex             UBE  2'/2' 3'/3' 3'/3' 3'/3' 3'/3' 3'/3'      TB ER HEP GTB  x20 GTB  x20 GTB  x20   OTB  x20      TB IR HEP GTB  x20 GTB  x20 GTB  x20         TB shld ext HEP BTB  x20 BTB  x20 BTB  x20 BTB  x20 BTB  x20 BTB  x20      TB rows  BTB  x20 BTB  x20 BTB  x20 BTB  x20 BTB  x20 BTB  x20      TB Biceps  BTB  x20  BTB  x20 BTB  x20 BTB  x20 BTB  x20      TB Triceps  BTB  x20  BTB  x20 BTB  x20 BTB  x20 BTB  x20      TB Horz ABD    GTB  x20 GTB  x20 GTB  x20 GTB  x20      Supine Flex    x10 2# x10 3# x10 3# x10      SL ER    x10 2# x10 3# x10 3# x10      SL ABD    x10 2# x10 2# x10 2# x10      Std Abd Circles      1# x20 ea way 2# x20 ea      Bent over Rows      7.5#  x30 10#  10x2      CC LPD       65#  10x2      CC Rows       65#  10x2                   Ther Activity Gait Training                                       Modalities

## 2023-10-02 ENCOUNTER — EVALUATION (OUTPATIENT)
Dept: PHYSICAL THERAPY | Facility: CLINIC | Age: 68
End: 2023-10-02
Payer: MEDICARE

## 2023-10-02 DIAGNOSIS — M19.011 ARTHRITIS OF RIGHT SHOULDER REGION: Primary | ICD-10-CM

## 2023-10-02 PROCEDURE — 97110 THERAPEUTIC EXERCISES: CPT | Performed by: PHYSICAL THERAPIST

## 2023-10-02 PROCEDURE — 97140 MANUAL THERAPY 1/> REGIONS: CPT | Performed by: PHYSICAL THERAPIST

## 2023-10-02 NOTE — PROGRESS NOTES
PT Re-Evaluation     Today's date: 10/2/2023  Patient name: Philomena Chavez  : 1955  MRN: [id number]  Referring provider: Sujatha Fox MD  Dx:   Encounter Diagnosis     ICD-10-CM    1. Arthritis of right shoulder region  M19.011                      Assessment  Assessment details: Philomena Chavez is a 80 y.o. male who presents with pain, decreased strength, and decreased ROM. Due to these impairments, patient has difficulty performing a/iadls and recreational activities. Patient's clinical presentation is consistent with their referring diagnosis of arthritis of right shoulder region. Patient would benefit from skilled physical therapy to address their aforementioned impairments, improve their level of function and to improve their overall quality of life. Re-assessment 10/2/23:  Jen Cho has attended 8 visits since the initiation of OPPT and reports a 10% GROC. Clinically demonstrates improved overall R shoulder ROM but continued strength deficits related to biceps compensation and + speeds testing. It is likely that sxs are related to overuse related sxs. This suggests the need for continued skilled OPPT to address his remaining impairments, achieve established goals and return to PLOF pain-free. Impairments: abnormal or restricted ROM, impaired physical strength and pain with function  Understanding of Dx/Px/POC: good   Prognosis: good    Goals  Short term goals - to be achieved in 4 weeks:    Decrease pain 20-50%. - partially met   Increase strength by 1/2 grade.- not met   Improve range of motion by 25%. - MET  Long term goals - to be achieved by discharge:    Overhead reaching is improved to maximal level of function. - not met   Lifting is improved to maximal level of function. - not met   IADL performance in related activities is improved to maximal level of function. - not met     Performance in related household activities is improved to maximal level of function.  No met    Plan  Plan details: Continued POC for R shoulder ROM, strength and stability; monitor sxs and progress as able   Planned therapy interventions: manual therapy, neuromuscular re-education, patient education, strengthening, stretching, therapeutic activities, therapeutic exercise and home exercise program  Frequency: 2x week  Duration in visits: 12  Duration in weeks: 6  Plan of Care beginning date: 10/2/2023  Plan of Care expiration date: 2023  Treatment plan discussed with: patient        Subjective Evaluation    History of Present Illness  Mechanism of injury: Patient refers to PT with c/o pain in his right shoulder which began approximately six months previous attributed to crushing cans. Patient received X-rays of his right shoulder in 10/2022 which revealed mild glenohumeral and acromioclavicular osteoarthritis. Patient c/o pain when reaching behind his back and pain with heavy lifting activities. Patient denies numbness and tingling in his right UE. Patient is retired. Patient received prior PT for his right shoulder in this office in 2022 to 10/2022; states improvement in his his shoulder with prior PT treatment. Re-assessment 10/2/23:  Stan Pina has attended 8 visits since the initiation of OPPT and reports a 10% GROC. Reports that he can throw a frisbee and work. Reports that he still has pain in his shoulder with getting dressed or picking up weights. Notes sometimes getting pain with sleeping on his arm. Feels like he can perform almost 90% of all tasks. Wishes to continue with PT if able.    Patient Goals  Patient goals for therapy: decreased pain    Pain  Current pain ratin  At best pain ratin  At worst pain ratin  Aggravating factors: lifting (Reaching behind back)          Objective     Active Range of Motion     Right Shoulder   Flexion: 170 degrees   Abduction: 145 degrees   External rotation 0°: 72 degrees   External rotation BTH: T2   Internal rotation BTB: T9     Passive Range of Motion     Right Shoulder   Flexion: 165 degrees   Abduction: 165 degrees   External rotation 90°: 85 degrees   Internal rotation 90°: 75 degrees     Strength/Myotome Testing     Right Shoulder     Planes of Motion   Flexion: 4   Abduction: 4-   External rotation at 0°: 4-   Internal rotation at 0°: 4+     Tests     Right Shoulder   Positive external rotation lag sign, painful arc and Speed's. Negative empty can and full can.               Precautions: DM 2; Schitzophrenia; depression  EPOC: 12/2/24    Manuals 7/26 8/15 8/17 8/22 8/24 9/11 9/25 10/2     Right shoulder PROM  WE WE WE WE WE WE PF     R UT STM   WE WE WE WE WE PF     Re-assess        PF 30'                  Neuro Re-Ed             Prone I's  x10 x10 x10  10       Prone T's   x10 x10 x10  10       Prone Y's  x10 x10 x10  10       Supine Serratus punch  2#  x10 2#  10x2                                                 Ther Ex             UBE  2'/2' 3'/3' 3'/3' 3'/3' 3'/3' 3'/3' 3/3     TB ER HEP GTB  x20 GTB  x20 GTB  x20   OTB  x20 OTB 20x     TB IR HEP GTB  x20 GTB  x20 GTB  x20         TB shld ext HEP BTB  x20 BTB  x20 BTB  x20 BTB  x20 BTB  x20 BTB  x20      TB rows  BTB  x20 BTB  x20 BTB  x20 BTB  x20 BTB  x20 BTB  x20      TB Biceps  BTB  x20  BTB  x20 BTB  x20 BTB  x20 BTB  x20 BTB 20x upper cut     TB Triceps  BTB  x20  BTB  x20 BTB  x20 BTB  x20 BTB  x20      TB Horz ABD    GTB  x20 GTB  x20 GTB  x20 GTB  x20      Supine Flex    x10 2# x10 3# x10 3# x10 3# upper cut flexion      SL ER    x10 2# x10 3# x10 3# x10      SL ABD    x10 2# x10 2# x10 2# x10      Std Abd Circles      1# x20 ea way 2# x20 ea      Bent over Rows      7.5#  x30 10#  10x2      CC LPD       65#  10x2      CC Rows       65#  10x2                   Ther Activity                                       Gait Training                                       Modalities

## 2023-10-02 NOTE — LETTER
2023    Fish Rai, [address] [facility] [address]    Patient: Paco Islas   YOB: 1955   Date of Visit: 10/2/2023     Encounter Diagnosis     ICD-10-CM    1. Arthritis of right shoulder region  M19.011           Dear Dr. Delfina Lucero:    Thank you for your recent referral of Paco Islas. Please review the attached evaluation summary from Bryce's recent visit. Please verify that you agree with the plan of care by signing the attached order. If you have any questions or concerns, please do not hesitate to call. I sincerely appreciate the opportunity to share in the care of one of your patients and hope to have another opportunity to work with you in the near future. Sincerely,    Daryle Barr, PT      Referring Provider:      I certify that I have read the below Plan of Care and certify the need for these services furnished under this plan of treatment while under my care. Fish Rai MD  [address] [place] [id number]  Via Fax: [phone]          PT Re-Evaluation     Today's date: 10/2/2023  Patient name: Paco Islas  : 1955  MRN: [id number]  Referring provider: Fish Rai MD  Dx:   Encounter Diagnosis     ICD-10-CM    1. Arthritis of right shoulder region  M19.011                      Assessment  Assessment details: Paco Islas is a 80 y.o. male who presents with pain, decreased strength, and decreased ROM. Due to these impairments, patient has difficulty performing a/iadls and recreational activities. Patient's clinical presentation is consistent with their referring diagnosis of arthritis of right shoulder region. Patient would benefit from skilled physical therapy to address their aforementioned impairments, improve their level of function and to improve their overall quality of life. Re-assessment 10/2/23:  Kaila Dianelys has attended 8 visits since the initiation of OPPT and reports a 10% GROC.   Clinically demonstrates improved overall R shoulder ROM but continued strength deficits related to biceps compensation and + speeds testing. It is likely that sxs are related to overuse related sxs. This suggests the need for continued skilled OPPT to address his remaining impairments, achieve established goals and return to PLOF pain-free. Impairments: abnormal or restricted ROM, impaired physical strength and pain with function  Understanding of Dx/Px/POC: good   Prognosis: good    Goals  Short term goals - to be achieved in 4 weeks:    Decrease pain 20-50%. - partially met   Increase strength by 1/2 grade.- not met   Improve range of motion by 25%. - MET  Long term goals - to be achieved by discharge:    Overhead reaching is improved to maximal level of function. - not met   Lifting is improved to maximal level of function. - not met   IADL performance in related activities is improved to maximal level of function. - not met     Performance in related household activities is improved to maximal level of function. No met    Plan  Plan details: Continued POC for R shoulder ROM, strength and stability; monitor sxs and progress as able   Planned therapy interventions: manual therapy, neuromuscular re-education, patient education, strengthening, stretching, therapeutic activities, therapeutic exercise and home exercise program  Frequency: 2x week  Duration in visits: 12  Duration in weeks: 6  Plan of Care beginning date: 10/2/2023  Plan of Care expiration date: 12/4/2023  Treatment plan discussed with: patient        Subjective Evaluation    History of Present Illness  Mechanism of injury: Patient refers to PT with c/o pain in his right shoulder which began approximately six months previous attributed to crushing cans. Patient received X-rays of his right shoulder in 10/2022 which revealed mild glenohumeral and acromioclavicular osteoarthritis.   Patient c/o pain when reaching behind his back and pain with heavy lifting activities. Patient denies numbness and tingling in his right UE. Patient is retired. Patient received prior PT for his right shoulder in this office in 2022 to 10/2022; states improvement in his his shoulder with prior PT treatment. Re-assessment 10/2/23:  Aline Jones has attended 8 visits since the initiation of OPPT and reports a 10% GROC. Reports that he can throw a frisbee and work. Reports that he still has pain in his shoulder with getting dressed or picking up weights. Notes sometimes getting pain with sleeping on his arm. Feels like he can perform almost 90% of all tasks. Wishes to continue with PT if able. Patient Goals  Patient goals for therapy: decreased pain    Pain  Current pain ratin  At best pain ratin  At worst pain ratin  Aggravating factors: lifting (Reaching behind back)          Objective     Active Range of Motion     Right Shoulder   Flexion: 170 degrees   Abduction: 145 degrees   External rotation 0°: 72 degrees   External rotation BTH: T2   Internal rotation BTB: T9     Passive Range of Motion     Right Shoulder   Flexion: 165 degrees   Abduction: 165 degrees   External rotation 90°: 85 degrees   Internal rotation 90°: 75 degrees     Strength/Myotome Testing     Right Shoulder     Planes of Motion   Flexion: 4   Abduction: 4-   External rotation at 0°: 4-   Internal rotation at 0°: 4+     Tests     Right Shoulder   Positive external rotation lag sign, painful arc and Speed's. Negative empty can and full can.               Precautions: DM 2; Schitzophrenia; depression  EPOC: 24    Manuals 7/26 8/15 8/17 8/22 8/24 9/11 9/25 10/2     Right shoulder PROM  WE WE WE WE WE WE PF     R UT STM   WE WE WE WE WE PF     Re-assess        PF 30'                  Neuro Re-Ed             Prone I's  x10 x10 x10  10       Prone T's   x10 x10 x10  10       Prone Y's  x10 x10 x10  10       Supine Serratus punch  2#  x10 2#  10x2                                                 Ther Ex             UBE  2'/2' 3'/3' 3'/3' 3'/3' 3'/3' 3'/3' 3/3     TB ER HEP GTB  x20 GTB  x20 GTB  x20   OTB  x20 OTB 20x     TB IR HEP GTB  x20 GTB  x20 GTB  x20         TB shld ext HEP BTB  x20 BTB  x20 BTB  x20 BTB  x20 BTB  x20 BTB  x20      TB rows  BTB  x20 BTB  x20 BTB  x20 BTB  x20 BTB  x20 BTB  x20      TB Biceps  BTB  x20  BTB  x20 BTB  x20 BTB  x20 BTB  x20 BTB 20x upper cut     TB Triceps  BTB  x20  BTB  x20 BTB  x20 BTB  x20 BTB  x20      TB Horz ABD    GTB  x20 GTB  x20 GTB  x20 GTB  x20      Supine Flex    x10 2# x10 3# x10 3# x10 3# upper cut flexion      SL ER    x10 2# x10 3# x10 3# x10      SL ABD    x10 2# x10 2# x10 2# x10      Std Abd Circles      1# x20 ea way 2# x20 ea      Bent over Rows      7.5#  x30 10#  10x2      CC LPD       65#  10x2      CC Rows       65#  10x2                   Ther Activity                                       Gait Training                                       Modalities

## 2023-10-23 ENCOUNTER — VBI (OUTPATIENT)
Dept: ADMINISTRATIVE | Facility: OTHER | Age: 68
End: 2023-10-23

## 2023-11-02 ENCOUNTER — HOSPITAL ENCOUNTER (OUTPATIENT)
Dept: CT IMAGING | Facility: HOSPITAL | Age: 68
Discharge: HOME | End: 2023-11-02
Attending: INTERNAL MEDICINE
Payer: MEDICARE

## 2023-11-02 DIAGNOSIS — F17.210 SMOKING GREATER THAN 20 PACK YEARS: ICD-10-CM

## 2023-11-02 DIAGNOSIS — Z12.2 ENCOUNTER FOR SCREENING FOR LUNG CANCER: ICD-10-CM

## 2023-11-02 PROCEDURE — 71271 CT THORAX LUNG CANCER SCR C-: CPT

## 2023-11-10 NOTE — RESULT ENCOUNTER NOTE
Call patient: ct chest shows stable lung nodules, there was no new lung nodule!  We will discuss this in the office

## 2023-11-21 LAB
EXTERNAL EGFR: 94
HBA1C MFR BLD HPLC: 5.9 %

## 2023-11-30 ENCOUNTER — OFFICE VISIT (OUTPATIENT)
Dept: FAMILY MEDICINE CLINIC | Facility: HOSPITAL | Age: 68
End: 2023-11-30
Payer: MEDICARE

## 2023-11-30 VITALS
TEMPERATURE: 97.5 F | OXYGEN SATURATION: 95 % | RESPIRATION RATE: 16 BRPM | BODY MASS INDEX: 20.46 KG/M2 | HEART RATE: 76 BPM | DIASTOLIC BLOOD PRESSURE: 70 MMHG | HEIGHT: 76 IN | SYSTOLIC BLOOD PRESSURE: 104 MMHG | WEIGHT: 168 LBS

## 2023-11-30 DIAGNOSIS — J42 CHRONIC BRONCHITIS, UNSPECIFIED CHRONIC BRONCHITIS TYPE (HCC): Primary | ICD-10-CM

## 2023-11-30 DIAGNOSIS — E11.9 TYPE 2 DIABETES MELLITUS WITHOUT COMPLICATION, WITHOUT LONG-TERM CURRENT USE OF INSULIN (HCC): ICD-10-CM

## 2023-11-30 PROBLEM — J41.1 MUCOPURULENT CHRONIC BRONCHITIS (HCC): Status: RESOLVED | Noted: 2020-03-02 | Resolved: 2023-11-30

## 2023-11-30 PROCEDURE — 99213 OFFICE O/P EST LOW 20 MIN: CPT | Performed by: INTERNAL MEDICINE

## 2023-11-30 RX ORDER — LIDOCAINE 4 G/G
PATCH TOPICAL
COMMUNITY

## 2023-11-30 RX ORDER — MECLIZINE HCL 12.5 MG/1
TABLET ORAL
COMMUNITY

## 2023-11-30 NOTE — ASSESSMENT & PLAN NOTE
Patient continues to smoke a pack a day. Denies shortness of breath or wheezing. Has chronic cough. He is precontemplative about quitting smoking. Lungs are clear to auscultation    Lung cancer screening CT of the chest this month showed no new nodules. We will repeat it next year.     Continue prn  Albuterol

## 2023-11-30 NOTE — PROGRESS NOTES
Assessment/Plan:    Type 2 diabetes mellitus without complication, without long-term current use of insulin ([facility])    Lab Results   Component Value Date    HGBA1C 5.9 11/21/2023     A1c is well controlled. Patient denies diarrhea on metformin. Continue metformin. Recheck blood work before next visit in 6 months      COPD (chronic obstructive pulmonary disease) ([address] )  Patient continues to smoke a pack a day. Denies shortness of breath or wheezing. Has chronic cough. He is precontemplative about quitting smoking. Lungs are clear to auscultation    Lung cancer screening CT of the chest this month showed no new nodules. We will repeat it next year. Continue prn  Albuterol     Diagnoses and all orders for this visit:    Chronic bronchitis, unspecified chronic bronchitis type ([address])    Type 2 diabetes mellitus without complication, without long-term current use of insulin ([facility])  -     Comprehensive metabolic panel; Future  -     Hemoglobin A1C; Future  -     Albumin / creatinine urine ratio; Future  -     Lipid Panel with Direct LDL reflex; Future  -     TSH, 3rd generation with Free T4 reflex; Future  -     CBC and Platelet; Future    Other orders  -     Lidocaine 4 % PTCH; Apply patch daily and remove 12 hrs later for pain control  -     meclizine (ANTIVERT) 12.5 MG tablet; 1 bid as needed for nausea          Subjective:      Patient ID: Gio Carlson is a 80 y.o. male. Patient had a mechanical fall last week, he stumbled and hit the right anterior chest wall. Discomfort is getting better. Denies pleurisy, hemoptysis, shortness of breath. He has a lidocaine patch on        Patient presents for follow-up of chronic conditions as detailed in the assessment and plan.       The following portions of the patient's history were reviewed and updated as appropriate: current medications, past family history, past medical history, past social history, past surgical history and problem list.    Review of Systems Objective:    /70   Pulse 76   Temp 97.5 °F (36.4 °C) (Tympanic)   Resp 16   Ht 6' 4" (1.93 m)   Wt 76.2 kg (168 lb)   SpO2 95%   BMI 20.45 kg/m²      Physical Exam  HENT:      Head: Normocephalic. Eyes:      Conjunctiva/sclera: Conjunctivae normal.   Cardiovascular:      Rate and Rhythm: Normal rate and regular rhythm. Heart sounds: No murmur heard. Pulmonary:      Effort: No respiratory distress. Breath sounds: No wheezing or rales. Abdominal:      General: Bowel sounds are normal.      Palpations: Abdomen is soft. Tenderness: There is no abdominal tenderness. Musculoskeletal:         General: Tenderness (Has mild right lower anterior chest wall tenderness on palpation. Patient fell last week. Discomfort is getting better.) present. Cervical back: Neck supple. Skin:     General: Skin is warm and dry. Neurological:      Mental Status: He is alert and oriented to person, place, and time. Cranial Nerves: No cranial nerve deficit. Motor: No weakness.       Gait: Gait normal.   Psychiatric:         Mood and Affect: Mood normal.             Maynor Ware MD

## 2023-11-30 NOTE — ASSESSMENT & PLAN NOTE
Lab Results   Component Value Date    HGBA1C 5.9 11/21/2023     A1c is well controlled. Patient denies diarrhea on metformin. Continue metformin.   Recheck blood work before next visit in 6 months

## 2023-12-21 ENCOUNTER — VBI (OUTPATIENT)
Dept: ADMINISTRATIVE | Facility: OTHER | Age: 68
End: 2023-12-21

## 2024-01-25 ENCOUNTER — TELEPHONE (OUTPATIENT)
Dept: FAMILY MEDICINE CLINIC | Facility: HOSPITAL | Age: 69
End: 2024-01-25

## 2024-01-25 NOTE — TELEPHONE ENCOUNTER
Patient currently being given lidocaine 4% patch daily by living facility because script is written as a straight order.    Facility asking if provider wants him to continue on this daily?      Facility feels this could be changed to a prn order.    If provider agreeable to change to prn, please fax order to [phone].

## 2024-03-18 ENCOUNTER — OFFICE VISIT (OUTPATIENT)
Dept: SURGERY | Facility: HOSPITAL | Age: 69
End: 2024-03-18
Payer: MEDICARE

## 2024-03-18 VITALS
OXYGEN SATURATION: 95 % | SYSTOLIC BLOOD PRESSURE: 115 MMHG | HEIGHT: 76 IN | RESPIRATION RATE: 16 BRPM | WEIGHT: 160.4 LBS | DIASTOLIC BLOOD PRESSURE: 70 MMHG | BODY MASS INDEX: 19.53 KG/M2 | TEMPERATURE: 97.5 F | HEART RATE: 76 BPM

## 2024-03-18 DIAGNOSIS — K42.9 UMBILICAL HERNIA WITHOUT OBSTRUCTION AND WITHOUT GANGRENE: Primary | ICD-10-CM

## 2024-03-18 PROCEDURE — 99203 OFFICE O/P NEW LOW 30 MIN: CPT | Performed by: SURGERY

## 2024-03-18 PROCEDURE — 99213 OFFICE O/P EST LOW 20 MIN: CPT | Performed by: SURGERY

## 2024-03-19 ENCOUNTER — TELEPHONE (OUTPATIENT)
Age: 69
End: 2024-03-19

## 2024-03-19 NOTE — TELEPHONE ENCOUNTER
Danica from [facility] called for an after visit summary to update chart. Warm transfer to Radha in Dr Lorenzo office

## 2024-04-05 ENCOUNTER — OFFICE VISIT (OUTPATIENT)
Dept: GASTROENTEROLOGY | Facility: CLINIC | Age: 69
End: 2024-04-05
Payer: MEDICARE

## 2024-04-05 VITALS
SYSTOLIC BLOOD PRESSURE: 120 MMHG | WEIGHT: 166 LBS | BODY MASS INDEX: 20.22 KG/M2 | HEIGHT: 76 IN | DIASTOLIC BLOOD PRESSURE: 80 MMHG

## 2024-04-05 DIAGNOSIS — K21.9 GASTROESOPHAGEAL REFLUX DISEASE, UNSPECIFIED WHETHER ESOPHAGITIS PRESENT: ICD-10-CM

## 2024-04-05 DIAGNOSIS — K59.00 CONSTIPATION, UNSPECIFIED CONSTIPATION TYPE: Primary | ICD-10-CM

## 2024-04-05 DIAGNOSIS — Z12.11 SCREENING FOR COLON CANCER: ICD-10-CM

## 2024-04-05 PROCEDURE — 99214 OFFICE O/P EST MOD 30 MIN: CPT | Performed by: INTERNAL MEDICINE

## 2024-04-05 NOTE — PROGRESS NOTES
[facility] - Outpatient Follow-up Note  Bryce Silver 68 y.o. male MRN: [id number]  Encounter: [id number]    ASSESSMENT AND PLAN:    1. Constipation, unspecified constipation type    2. Screening for colon cancer  -     Cologuard    3. Gastroesophageal reflux disease, unspecified whether esophagitis present      Constipation.  Patient is recommended to continue with MiraLAX 17 g once daily, aiming for a soft, regular bowel movement. A stool test is suggested. In the event of a positive stool test result, a discussion regarding further intervention will be necessary.     Screening for colorectal cancer.   Unfortunately, he had inadequate bowel preparations and colonoscopies in 2020, 2022 and 2023. Patient would need repeat colonoscopy with 2-day bowel prep but currently he does want to avoid going through with another colonoscopy at this time. We will recommend Cologuard at this time. Patient understands the risk of undiagnosed polyps that can lead to colorectal cancer.     GERD.  He does have a history of grade B esophagitis. He is currently on omeprazole 40 mg once daily and can continue at this time.    Follow-up  Patient will follow-up in a year.    ---I have spent 34 minutes today on the date of visit which was spent on one or more of the following: obtaining and reviewing separately obtained history, performing a medically appropriate examination and evaluation, counseling and educating the patient, ordering medications, tests, and procedures, documenting clinical information in the electronic or other health record, and care coordination.    Chief Complaint   Patient presents with   • Follow-up     Pt has hernia       HPI:   Bryce Silver is a 68 y.o. year old male with a significant past medical history of schizophrenia, tobacco use disorder, prior colon polyps, history of esophagitis, who is presenting for follow-up regarding colorectal cancer screening. The patient  last had an EGD and colonoscopy on 03/29/2023. EGD shows 5 cm hiatal hernia with grade B esophagitis and was started on omeprazole 40 mg once daily. Colonoscopy at that time showed poor bowel preparation and was recommended a repeat colonoscopy within 1 year.    The patient reports a mild umbilical hernia, which is mildly painful upon palpation. He has intermittent diarrhea but denies any hematochezia or abdominal pain. He continues to experience occasional heartburn, which he manages with omeprazole taken daily. His bowel movements are regular, with the most recent occurring last night and this morning and hematochezia was not noted at this time. His current treatment regimen includes MiraLAX, which has been effective, facilitating bowel movements every other day. He is averse to undergoing another endoscopy or colonoscopy. He inquires about the necessity of surgery if his condition worsens.    LAB/RADIOLOGY/ENDOSCOPY RESULTS:   The pertinent positive and negative findings are as noted in the HPI.    Historical Information   Past Medical History:   Diagnosis Date   • Atopic rhinitis    • BPH with urinary obstruction    • COPD (chronic obstructive pulmonary disease) ([facility])    • Hyperlipidemia    • Incomplete bladder emptying    • Schizophrenia (HCC)    • Urinary retention      Past Surgical History:   Procedure Laterality Date   • RIB FRACTURE SURGERY       Social History     Substance and Sexual Activity   Alcohol Use No    Comment: hx. of hard liquor, regular use, quit at age 27.     Social History     Substance and Sexual Activity   Drug Use No     Social History     Tobacco Use   Smoking Status Every Day   • Current packs/day: 1.00   • Average packs/day: 1 pack/day for 45.0 years (45.0 ttl pk-yrs)   • Types: Cigarettes   Smokeless Tobacco Never     Family History   Problem Relation Age of Onset   • Heart attack Father    • Dementia Mother    • Substance Abuse Sister         cigarettes   • Substance Abuse Brother   "  • Alcohol abuse Brother        Meds/Allergies     Current Outpatient Medications:   •  acetaminophen (TYLENOL) 500 mg tablet  •  albuterol (PROVENTIL HFA,VENTOLIN HFA) 90 mcg/act inhaler  •  ASPIRIN 81 PO  •  b complex vitamins capsule  •  benztropine (COGENTIN) 1 mg tablet  •  buPROPion (WELLBUTRIN XL) 150 mg 24 hr tablet  •  buPROPion (WELLBUTRIN XL) 300 mg 24 hr tablet  •  Cholecalciferol (VITAMIN D3) 5000 units CAPS  •  folic acid (FOLVITE) 1 mg tablet  •  haloperidol (HALDOL) 10 mg tablet  •  haloperidol (HALDOL) 2 mg tablet  •  haloperidol (HALDOL) 5 mg tablet  •  Lidocaine Viscous HCl (XYLOCAINE) 2 % mucosal solution  •  lurasidone (LATUDA) 120 mg tablet  •  meclizine (ANTIVERT) 12.5 MG tablet  •  meloxicam (MOBIC) 7.5 mg tablet  •  Menthol-Methyl Salicylate (JOSE ANGEL GUZMÁN GREASELESS) 10-15 % greaseless cream  •  metFORMIN (GLUCOPHAGE) 500 mg tablet  •  Multiple Vitamins-Minerals (MULTIVITAMIN WITH MINERALS) tablet  •  nicotine polacrilex (COMMIT) 2 MG lozenge  •  Omega-3 Fatty Acids (fish oil) 1,000 mg  •  omeprazole (PriLOSEC) 40 MG capsule  •  paliperidone (INVEGA) 3 mg 24 hr tablet  •  pravastatin (PRAVACHOL) 40 mg tablet  •  sertraline (ZOLOFT) 100 mg tablet  •  tamsulosin (FLOMAX) 0.4 mg  •  Naloxone HCl (NARCAN NA)  •  polyethylene glycol (MIRALAX) 17 g packet  No Known Allergies    PHYSICAL EXAM:    Blood pressure 120/80, height 6' 4\" (1.93 m), weight 75.3 kg (166 lb). Body mass index is 20.21 kg/m².  General Appearance: No apparent distress, cooperative, alert.  Eyes: Anicteric.  Gastrointestinal: Soft, non-tender, non-distended; normal bowel sounds; no masses, no organomegaly. Umbilical hernia that is soft and reducible  Rectal: Deferred.  Musculoskeletal: No edema.  Skin: No jaundice.    OTHER LAB RESULTS:   Lab Results   Component Value Date    WBC 7.38 02/23/2023    WBC 7.66 12/27/2022    HGB 14.0 02/23/2023    HGB 14.2 12/27/2022    MCV 96 02/23/2023     02/23/2023     12/27/2022 " "    Lab Results   Component Value Date    K 4.6 05/03/2023     05/03/2023    CO2 30 05/03/2023    BUN 14 05/03/2023    CREATININE 0.84 05/03/2023    GLUF 102 (H) 10/17/2022    CALCIUM 10.2 (H) 05/03/2023    AST 28 05/03/2023    AST 18 02/23/2023    AST 28 02/21/2023    ALT 25 05/03/2023    ALT 20 02/23/2023    ALT 31 02/21/2023    ALKPHOS 65 05/03/2023    ALKPHOS 50 02/23/2023    ALKPHOS 75 02/21/2023    EGFR 94 11/21/2023     No results found for: \"IRON\", \"TIBC\", \"FERRITIN\"  Lab Results   Component Value Date    LIPASE 25 02/23/2023       OTHER RADIOLOGY RESULTS:   No results found.    Transcribed for Tex Recinos MD, by Tracie Fox on 04/05/24 at 11:42 AM. Powered by Dragon [employer].  "

## 2024-04-22 NOTE — PROGRESS NOTES
Assessment/Plan:   Bryce Silver is a 68 y.o.male who is here for Follow-up (FOLLOW UP- UMBILICAL HERNIA//PT is here for a f/u for an umb hernia, has been experiencing no pain. He has a visible bulge that he is able to push in. Has not been eating much due to the type of food he is served. Bowel movements have been doing well, has been having loose stools at some points when he doesn't eat.)  .    Plan: Umbilical hernia - discussed operative vs conservative mgt, surgical approaches, risks and benefits and patient's hernia is symptomatic at this time and will consider options. I will schedule at their earliest convenience.      Preoperative Clearance: None    HPI:  Bryce Silver is a 68 y.o.male who was referred for evaluation of Follow-up (FOLLOW UP- UMBILICAL HERNIA//PT is here for a f/u for an umb hernia, has been experiencing no pain. He has a visible bulge that he is able to push in. Has not been eating much due to the type of food he is served. Bowel movements have been doing well, has been having loose stools at some points when he doesn't eat.)  .    Currently umb bulge.     ROS:  General ROS: negative  negative for - chills, fatigue, fever or night sweats, weight loss  Respiratory ROS: no cough, shortness of breath, or wheezing  Cardiovascular ROS: no chest pain or dyspnea on exertion  Genito-Urinary ROS: no dysuria, trouble voiding, or hematuria  Musculoskeletal ROS: negative for - gait disturbance, joint pain or muscle pain  Neurological ROS: no TIA or stroke symptoms  Umb hernia    [unfilled]  Patient has no known allergies.    Current Outpatient Medications:     acetaminophen (TYLENOL) 500 mg tablet, Take 1 tablet (500 mg total) by mouth every 8 (eight) hours as needed for mild pain or headaches, Disp: 90 tablet, Rfl: 1    albuterol (PROVENTIL HFA,VENTOLIN HFA) 90 mcg/act inhaler, Inhale 2 puffs 2 (two) times a day, Disp: 8 g, Rfl: 5    ASPIRIN 81 PO, Take by mouth 1  daily , Disp: , Rfl:     b complex  vitamins capsule, Take 1 capsule by mouth daily, Disp: 30 capsule, Rfl: 6    benztropine (COGENTIN) 1 mg tablet, 1 twice daily and 1 as needed for EPS, Disp: , Rfl:     buPROPion (WELLBUTRIN XL) 150 mg 24 hr tablet, 1 DAILY, Disp: , Rfl:     buPROPion (WELLBUTRIN XL) 300 mg 24 hr tablet, Take 300 mg by mouth daily, Disp: , Rfl:     Cholecalciferol (VITAMIN D3) 5000 units CAPS, Take by mouth 1 daily, Disp: , Rfl:     folic acid (FOLVITE) 1 mg tablet, Take 1 tablet (1 mg total) by mouth daily, Disp: 30 tablet, Rfl: 6    haloperidol (HALDOL) 10 mg tablet, 1 hs, Disp: , Rfl:     haloperidol (HALDOL) 2 mg tablet, 2 daily, Disp: , Rfl:     haloperidol (HALDOL) 5 mg tablet, 1 in the am, Disp: , Rfl:     Lidocaine Viscous HCl (XYLOCAINE) 2 % mucosal solution, Swish and spit 15 mL 3 (three) times a day as needed for mouth pain or discomfort, Disp: 100 mL, Rfl: 3    lurasidone (LATUDA) 120 mg tablet, Take by mouth 1 daily after dinner, Disp: , Rfl:     meclizine (ANTIVERT) 12.5 MG tablet, 1 bid as needed for nausea, Disp: , Rfl:     meloxicam (MOBIC) 7.5 mg tablet, 1 daily, Disp: , Rfl:     Menthol-Methyl Salicylate (JOSE ANGEL GUZMÁN GREASELESS) 10-15 % greaseless cream, Apply topically 4 (four) times a day as needed for muscle/joint pain, Disp: 30 g, Rfl: 3    metFORMIN (GLUCOPHAGE) 500 mg tablet, Take 500 mg by mouth 2 (two) times a day with meals, Disp: , Rfl:     Multiple Vitamins-Minerals (MULTIVITAMIN WITH MINERALS) tablet, Take 1 tablet by mouth daily, Disp: , Rfl:     nicotine polacrilex (COMMIT) 2 MG lozenge, Apply 1 lozenge (2 mg total) to the mouth or throat every 3 (three) hours as needed for smoking cessation, Disp: 168 lozenge, Rfl: 1    Omega-3 Fatty Acids (fish oil) 1,000 mg, Take 1,000 mg by mouth daily, Disp: , Rfl:     omeprazole (PriLOSEC) 40 MG capsule, Take 1 capsule (40 mg total) by mouth daily, Disp: 90 capsule, Rfl: 3    paliperidone (INVEGA) 3 mg 24 hr tablet, Take by mouth 1 in the AM, Disp: , Rfl:      pravastatin (PRAVACHOL) 40 mg tablet, Take 1 tablet (40 mg total) by mouth daily at bedtime, Disp: 30 tablet, Rfl: 5    sertraline (ZOLOFT) 100 mg tablet, 1 daily, Disp: , Rfl:     tamsulosin (FLOMAX) 0.4 mg, Take 1 capsule (0.4 mg total) by mouth daily with dinner, Disp: 90 capsule, Rfl: 3    Naloxone HCl (NARCAN NA), into each nostril Administer nasally when suspected overdose (Patient not taking: Reported on 2/15/2023), Disp: , Rfl:     polyethylene glycol (MIRALAX) 17 g packet, Take 17 g by mouth daily, Disp: 14 each, Rfl: 3  Past Medical History:   Diagnosis Date    Atopic rhinitis     BPH with urinary obstruction     COPD (chronic obstructive pulmonary disease) (HCC)     Hyperlipidemia     Incomplete bladder emptying     Schizophrenia (HCC)     Urinary retention      Past Surgical History:   Procedure Laterality Date    RIB FRACTURE SURGERY       Family History   Problem Relation Age of Onset    Heart attack Father     Dementia Mother     Substance Abuse Sister         cigarettes    Substance Abuse Brother     Alcohol abuse Brother       reports that he has been smoking cigarettes. He has a 45 pack-year smoking history. He has never used smokeless tobacco. He reports that he does not drink alcohol and does not use drugs.    Labs:   Lab Results   Component Value Date    WBC 7.38 02/23/2023    WBC 7.66 12/27/2022    HGB 14.0 02/23/2023    HGB 14.2 12/27/2022     02/23/2023     12/27/2022     Lab Results   Component Value Date    ALT 25 05/03/2023    ALT 20 02/23/2023    ALT 31 02/21/2023    ALT 32 12/27/2022    ALT 31 11/22/2022    AST 28 05/03/2023    AST 18 02/23/2023    AST 28 02/21/2023    AST 24 12/27/2022    AST 22 11/22/2022     This SmartLink has not been configured with any valid records.       PHYSICAL EXAM  General Appearance:    Alert, cooperative, no distress,    Head:    Normocephalic without obvious abnormality   Eyes:    PERRL, conjunctiva/corneas clear, EOM's intact        Neck:    "Supple, no adenopathy, no JVD   Back:     Symmetric, no spinal or CVA tenderness   Lungs:     Clear to auscultation bilaterally, no wheezing or rhonchi   Heart:    Regular rate and rhythm, S1 and S2 normal, no murmur   Abdomen:     Soft +BS ND NT _ umb hernia reducible   Extremities:   Extremities normal. No clubbing, cyanosis or edema   Psych:   Normal Affect, AOx3.    Neurologic:  Skin:   CNII-XII intact. Strength symmetric, speech intact    Warm, dry, intact, no visible rashes or lesions         Physical Exam              Some portions of this record may have been generated with voice recognition software. There may be translation, syntax,  or grammatical errors. Occasional wrong word or \"sound-a-like\" substitutions may have occurred due to the inherent limitations of the voice recognition software. Read the chart carefully and recognize, using context, where substitutions may have occurred. If you have any questions, please contact the dictating provider for clarification or correction, as needed. This encounter has been coded by a non-certified coder.   " DISPLAY PLAN FREE TEXT

## 2024-05-08 ENCOUNTER — TELEPHONE (OUTPATIENT)
Age: 69
End: 2024-05-08

## 2024-05-08 DIAGNOSIS — J42 CHRONIC BRONCHITIS, UNSPECIFIED CHRONIC BRONCHITIS TYPE (HCC): Primary | ICD-10-CM

## 2024-05-08 RX ORDER — ALBUTEROL SULFATE 90 UG/1
2 AEROSOL, METERED RESPIRATORY (INHALATION) EVERY 6 HOURS PRN
Qty: 8 G | Refills: 5 | Status: SHIPPED | OUTPATIENT
Start: 2024-05-08

## 2024-05-08 NOTE — TELEPHONE ENCOUNTER
Danica from [facility] called stating that patient would like to have a rescue inhaler PRN prescribed because he had been experiencing SOB in past few days.  He is not having SOB at moment.  Please advise.

## 2024-05-09 LAB
LEFT EYE DIABETIC RETINOPATHY: NORMAL
RIGHT EYE DIABETIC RETINOPATHY: NORMAL
SEVERITY (EYE EXAM): NORMAL

## 2024-05-24 ENCOUNTER — VBI (OUTPATIENT)
Dept: ADMINISTRATIVE | Facility: OTHER | Age: 69
End: 2024-05-24

## 2024-06-04 ENCOUNTER — OFFICE VISIT (OUTPATIENT)
Dept: FAMILY MEDICINE CLINIC | Facility: HOSPITAL | Age: 69
End: 2024-06-04
Payer: MEDICARE

## 2024-06-04 VITALS
RESPIRATION RATE: 16 BRPM | HEART RATE: 72 BPM | BODY MASS INDEX: 19.61 KG/M2 | DIASTOLIC BLOOD PRESSURE: 62 MMHG | SYSTOLIC BLOOD PRESSURE: 104 MMHG | HEIGHT: 76 IN | OXYGEN SATURATION: 95 % | WEIGHT: 161 LBS | TEMPERATURE: 97.6 F

## 2024-06-04 DIAGNOSIS — J42 CHRONIC BRONCHITIS, UNSPECIFIED CHRONIC BRONCHITIS TYPE (HCC): ICD-10-CM

## 2024-06-04 DIAGNOSIS — F19.11 HISTORY OF DRUG ABUSE IN REMISSION (HCC): ICD-10-CM

## 2024-06-04 DIAGNOSIS — E11.9 TYPE 2 DIABETES MELLITUS WITHOUT COMPLICATION, WITHOUT LONG-TERM CURRENT USE OF INSULIN (HCC): Primary | ICD-10-CM

## 2024-06-04 DIAGNOSIS — F33.0 MILD EPISODE OF RECURRENT MAJOR DEPRESSIVE DISORDER (HCC): ICD-10-CM

## 2024-06-04 DIAGNOSIS — R35.1 BENIGN PROSTATIC HYPERPLASIA WITH NOCTURIA: ICD-10-CM

## 2024-06-04 DIAGNOSIS — F20.9 SCHIZOPHRENIA, UNSPECIFIED TYPE (HCC): ICD-10-CM

## 2024-06-04 DIAGNOSIS — N40.1 BENIGN PROSTATIC HYPERPLASIA WITH NOCTURIA: ICD-10-CM

## 2024-06-04 DIAGNOSIS — Z12.5 SCREENING FOR PROSTATE CANCER: ICD-10-CM

## 2024-06-04 DIAGNOSIS — Z00.00 MEDICARE ANNUAL WELLNESS VISIT, SUBSEQUENT: ICD-10-CM

## 2024-06-04 DIAGNOSIS — K42.9 UMBILICAL HERNIA WITHOUT OBSTRUCTION AND WITHOUT GANGRENE: ICD-10-CM

## 2024-06-04 PROBLEM — F33.42 RECURRENT MAJOR DEPRESSIVE DISORDER, IN FULL REMISSION (HCC): Status: ACTIVE | Noted: 2024-06-04

## 2024-06-04 PROBLEM — F32.A DEPRESSIVE DISORDER: Status: RESOLVED | Noted: 2021-08-16 | Resolved: 2024-06-04

## 2024-06-04 LAB — SL AMB POCT HEMOGLOBIN AIC: 6 (ref ?–6.5)

## 2024-06-04 PROCEDURE — 99214 OFFICE O/P EST MOD 30 MIN: CPT | Performed by: INTERNAL MEDICINE

## 2024-06-04 PROCEDURE — 83036 HEMOGLOBIN GLYCOSYLATED A1C: CPT | Performed by: INTERNAL MEDICINE

## 2024-06-04 PROCEDURE — G0439 PPPS, SUBSEQ VISIT: HCPCS | Performed by: INTERNAL MEDICINE

## 2024-06-04 RX ORDER — MELOXICAM 15 MG/1
TABLET ORAL
COMMUNITY
Start: 2024-05-29

## 2024-06-04 RX ORDER — PALIPERIDONE 6 MG/1
TABLET, EXTENDED RELEASE ORAL
COMMUNITY
Start: 2024-05-07

## 2024-06-04 NOTE — ASSESSMENT & PLAN NOTE
Patient has mild COPD.  PFT in 2022 showed mild obstruction.  He continues to smoke.  He is willing to try to cut down on smoking but precontemplative about quitting  He has stable shortness of breath when walking upstairs.  Lungs are clear to auscultation today without wheezing or crackles    Continue albuterol as needed

## 2024-06-04 NOTE — PROGRESS NOTES
Ambulatory Visit  Name: Bryce Silver      : 1955      MRN: [id number]  Encounter Provider: Jessi Cloud MD  Encounter Date: 2024   Encounter department: [facility] [address]    Assessment & Plan   1. Type 2 diabetes mellitus without complication, without long-term current use of insulin (HCC)  Assessment & Plan:    Lab Results   Component Value Date    HGBA1C 5.9 2023       Patient has type 2 diabetes mellitus.  A1c was 6.  Blood sugars have been well-controlled.  Denies diarrhea on metformin.  Continue metformin 500 mg twice daily    He saw ophthalmology this year.  He gave us a urine sample for microalbumin  Sensation on monofilament testing of the feet was normal  Orders:  -     POCT hemoglobin A1c  -     Comprehensive metabolic panel; Future; Expected date: 2024  -     CBC and Platelet; Future; Expected date: 2024  -     Lipid Panel with Direct LDL reflex; Future; Expected date: 2024  -     TSH, 3rd generation with Free T4 reflex; Future; Expected date: 2024  -     Comprehensive metabolic panel  -     CBC and Platelet  -     Lipid Panel with Direct LDL reflex  -     TSH, 3rd generation with Free T4 reflex  -     Albumin / creatinine urine ratio; Future  -     Albumin / creatinine urine ratio  2. Medicare annual wellness visit, subsequent  3. Chronic bronchitis, unspecified chronic bronchitis type (HCC)  Assessment & Plan:  Patient has mild COPD.  PFT in  showed mild obstruction.  He continues to smoke.  He is willing to try to cut down on smoking but precontemplative about quitting  He has stable shortness of breath when walking upstairs.  Lungs are clear to auscultation today without wheezing or crackles    Continue albuterol as needed  4. Schizophrenia, unspecified type (HCC)  Assessment & Plan:  Pt has schizophrenia  He sometimes hears voices in the [employer] that are disturbing  He follows with psychiatry  Continue latuda, haloperidol  5.  Umbilical hernia without obstruction and without gangrene  Assessment & Plan:  Pt has a reducible umbilical hernia that sometimes causes pain.  Will refer him to surgery for hernia repair  Orders:  -     Ambulatory referral to General Surgery; Future  6. Benign prostatic hyperplasia with nocturia  Assessment & Plan:  Patient has BPH.  He feels that he is able to empty his bladder fine.  Denies dysuria, hematuria.  Continue tamsulosin  7. Mild episode of recurrent major depressive disorder (HCC)  Assessment & Plan:  Patient has recurrent depression.  He feels that his moods are stable.  He follows with psychiatry closely.  Continue bupropion and zoloft  8. 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 Screening for prostate cancer  -     PSA, Total Screen; Future  -     PSA, Total Screen      Depression Screening and Follow-up Plan: Patient's depression screening was positive with a PHQ-9 score of 5. Patient assessed for underlying major depression. Brief counseling provided and recommend additional follow-up/re-evaluation next office visit.     Falls Plan of Care: balance, strength, and gait training instructions were provided.     Tobacco Cessation Counseling: Tobacco cessation counseling was provided. The patient is sincerely urged to quit consumption of tobacco. He is not ready to quit tobacco. Medication options discussed.       Preventive health issues were discussed with patient, and age appropriate screening tests were ordered as noted in patient's After Visit Summary. Personalized health advice and appropriate referrals for health education or preventive services given if needed, as noted in patient's After Visit Summary.    History of Present Illness     HPI   Patient Care Team:  Jessi Cloud MD as PCP - General (Internal Medicine)    Review of Systems  Medical History Reviewed by provider this encounter:   Tobacco  Allergies  Meds  Problems  Med Hx  Surg Hx  Fam Hx       Annual Wellness Visit Questionnaire   Bryce is here for his Subsequent Wellness visit.     Health Risk Assessment:   Patient rates overall health as good. Patient feels that their physical health rating is same. Patient is dissatisfied with their life. Eyesight was rated as same. Hearing was rated as slightly worse. Patient feels that their emotional and mental health rating is same. Patients states they are sometimes angry. Patient states they are often unusually tired/fatigued. Pain experienced in the last 7 days has been some. Patient's pain rating has been 5/10. Patient states that he has experienced no weight loss or gain in last 6 months.     Depression Screening:   PHQ-9 Score: 5      Fall Risk Screening:   In the past year, patient has experienced: history of falling in past year    Number of falls: 1  Injured during fall?: No    Feels unsteady when standing or walking?: No    Worried about falling?: No      Home Safety:  Patient has trouble with stairs inside or outside of their home. Patient has working smoke alarms and has working carbon monoxide detector. Home safety hazards include: none.     Nutrition:   Current diet is Regular.     Medications:   Patient is currently taking over-the-counter supplements. OTC medications include: see medication list. Patient is not able to manage medications. Staff at [facility] helps pt.    Activities of Daily Living (ADLs)/Instrumental Activities of Daily Living (IADLs):   Walk and transfer into and out of bed and chair?: Yes  Dress and groom yourself?: Yes    Bathe or shower yourself?: Yes    Feed yourself? Yes  Do your laundry/housekeeping?: No  Manage your money, pay your bills and track your expenses?: No  Make your own meals?: No    Do your own shopping?: No    ADL comments: Staff at [facility] helps pt.    Previous Hospitalizations:   Any hospitalizations or ED visits within the last 12 months?:  No      Advance Care Planning:   Living will: No    Advanced directive counseling given: Yes      PREVENTIVE SCREENINGS      Cardiovascular Screening:    General: Screening Not Indicated and History Lipid Disorder    Due for: Lipid Panel      Diabetes Screening:     General: Screening Not Indicated and History Diabetes    Due for: Blood Glucose      Colorectal Cancer Screening:     General: Screening Current      Prostate Cancer Screening:    General: Risks and Benefits Discussed    Due for: PSA      Abdominal Aortic Aneurysm (AAA) Screening:    Risk factors include: age between 65-74 yo and tobacco use        Lung Cancer Screening:     General: Screening Current and Risks and Benefits Discussed      Hepatitis C Screening:    General: Screening Current    Screening, Brief Intervention, and Referral to Treatment (SBIRT)    Screening  Typical number of drinks in a day: 0  Typical number of drinks in a week: 0  Interpretation: Low risk drinking behavior.    Single Item Drug Screening:  How often have you used an illegal drug (including marijuana) or a prescription medication for non-medical reasons in the past year? never    Single Item Drug Screen Score: 0  Interpretation: Negative screen for possible drug use disorder    Brief Intervention  Alcohol & drug use screenings were reviewed. No concerns regarding substance use disorder identified.     Other Counseling Topics:   Regular weightbearing exercise.     Social Determinants of Health     Food Insecurity: No Food Insecurity (6/4/2024)    Hunger Vital Sign    • Worried About Running Out of Food in the Last Year: Never true    • Ran Out of Food in the Last Year: Never true   Transportation Needs: No Transportation Needs (6/4/2024)    PRAPARE - Transportation    • Lack of Transportation (Medical): No    • Lack of Transportation (Non-Medical): No   Housing Stability: Unknown (6/4/2024)    Housing Stability Vital Sign    • Unable to Pay for Housing in the Last Year: No  "   • Homeless in the Last Year: No   Utilities: Not At Risk (6/4/2024)    [facility] Utilities    • Threatened with loss of utilities: No     No results found.    Objective     /62   Pulse 72   Temp 97.6 °F (36.4 °C) (Tympanic)   Resp 16   Ht 6' 4\" (1.93 m)   Wt 73 kg (161 lb)   SpO2 95%   BMI 19.60 kg/m²     Physical Exam  Constitutional:       General: He is not in acute distress.     Appearance: He is well-developed. He is not toxic-appearing.   HENT:      Head: Normocephalic.      Right Ear: Tympanic membrane normal.      Left Ear: There is impacted cerumen.      Mouth/Throat:      Mouth: Mucous membranes are moist.      Pharynx: No oropharyngeal exudate.   Eyes:      Conjunctiva/sclera: Conjunctivae normal.   Cardiovascular:      Rate and Rhythm: Normal rate and regular rhythm.      Pulses: Pulses are weak.           Dorsalis pedis pulses are 0 on the right side and 0 on the left side.        Posterior tibial pulses are 2+ on the right side and 2+ on the left side.      Heart sounds: No murmur heard.  Pulmonary:      Effort: Pulmonary effort is normal. No respiratory distress.      Breath sounds: No wheezing, rhonchi or rales.   Abdominal:      General: Bowel sounds are normal.      Palpations: Abdomen is soft.      Tenderness: There is no abdominal tenderness.      Hernia: A hernia is present.   Musculoskeletal:         General: No tenderness.      Cervical back: Neck supple.   Feet:      Right foot:      Skin integrity: No ulcer.      Left foot:      Skin integrity: No ulcer.   Skin:     General: Skin is warm and dry.   Neurological:      Mental Status: He is alert.      Cranial Nerves: No cranial nerve deficit.      Motor: No weakness.      Gait: Gait normal.   Psychiatric:         Mood and Affect: Mood normal.     Diabetic Foot Exam    Patient's shoes and socks removed.    Right Foot/Ankle   Right Foot Inspection  Skin Exam: No ulcer.     Toe Exam: Erythema    Sensory   Monofilament testing: " intact    Vascular  The right DP pulse is 0. The right PT pulse is 2+.     Left Foot/Ankle  Left Foot Inspection  Skin Exam: No ulcer.     Toe Exam: No erythema.     Sensory   Monofilament testing: intact    Vascular  The left DP pulse is 0. The left PT pulse is 2+.     Assign Risk Category  No deformity present  No loss of protective sensation  Weak pulses  Risk: 1    Administrative Statements

## 2024-06-04 NOTE — ASSESSMENT & PLAN NOTE
Patient has BPH.  He feels that he is able to empty his bladder fine.  Denies dysuria, hematuria.  Continue tamsulosin

## 2024-06-04 NOTE — ASSESSMENT & PLAN NOTE
Lab Results   Component Value Date    HGBA1C 5.9 11/21/2023       Patient has type 2 diabetes mellitus.  A1c was 6.  Blood sugars have been well-controlled.  Denies diarrhea on metformin.  Continue metformin 500 mg twice daily    He saw ophthalmology this year.  He gave us a urine sample for microalbumin  Sensation on monofilament testing of the feet was normal

## 2024-06-04 NOTE — ASSESSMENT & PLAN NOTE
Pt has a reducible umbilical hernia that sometimes causes pain.  Will refer him to surgery for hernia repair

## 2024-06-04 NOTE — ASSESSMENT & PLAN NOTE
Pt has schizophrenia  He sometimes hears voices in the [employer] that are disturbing  He follows with psychiatry  Continue melba, haloperidol

## 2024-06-04 NOTE — PATIENT INSTRUCTIONS
Medicare Preventive Visit Patient Instructions  Thank you for completing your Welcome to Medicare Visit or Medicare Annual Wellness Visit today. Your next wellness visit will be due in one year (6/5/2025).  The screening/preventive services that you may require over the next 5-10 years are detailed below. Some tests may not apply to you based off risk factors and/or age. Screening tests ordered at today's visit but not completed yet may show as past due. Also, please note that scanned in results may not display below.  Preventive Screenings:  Service Recommendations Previous Testing/Comments   Colorectal Cancer Screening  Colonoscopy    Fecal Occult Blood Test (FOBT)/Fecal Immunochemical Test (FIT)  Fecal DNA/Cologuard Test  Flexible Sigmoidoscopy Age: 45-75 years old   Colonoscopy: every 10 years (May be performed more frequently if at higher risk)  OR  FOBT/FIT: every 1 year  OR  Cologuard: every 3 years  OR  Sigmoidoscopy: every 5 years  Screening may be recommended earlier than age 45 if at higher risk for colorectal cancer. Also, an individualized decision between you and your healthcare provider will decide whether screening between the ages of 76-85 would be appropriate. Colonoscopy: 03/29/2023  FOBT/FIT: Not on file  Cologuard: Not on file  Sigmoidoscopy: Not on file    Screening Current     Prostate Cancer Screening Individualized decision between patient and health care provider in men between ages of 55-69   Medicare will cover every 12 months beginning on the day after your 50th birthday PSA: No results in last 5 years           Hepatitis C Screening Once for adults born between 1945 and 1965  More frequently in patients at high risk for Hepatitis C Hep C Antibody: 05/13/2022    Screening Current   Diabetes Screening 1-2 times per year if you're at risk for diabetes or have pre-diabetes Fasting glucose: 102 mg/dL (10/17/2022)  A1C: 6.0 (6/4/2024)  Screening Not Indicated  History Diabetes   Cholesterol  Screening Once every 5 years if you don't have a lipid disorder. May order more often based on risk factors. Lipid panel: 10/17/2022  Screening Not Indicated  History Lipid Disorder      Other Preventive Screenings Covered by Medicare:  Abdominal Aortic Aneurysm (AAA) Screening: covered once if your at risk. You're considered to be at risk if you have a family history of AAA or a male between the age of 65-75 who smoking at least 100 cigarettes in your lifetime.  Lung Cancer Screening: covers low dose CT scan once per year if you meet all of the following conditions: (1) Age 55-77; (2) No signs or symptoms of lung cancer; (3) Current smoker or have quit smoking within the last 15 years; (4) You have a tobacco smoking history of at least 20 pack years (packs per day x number of years you smoked); (5) You get a written order from a healthcare provider.  Glaucoma Screening: covered annually if you're considered high risk: (1) You have diabetes OR (2) Family history of glaucoma OR (3)  aged 50 and older OR (4)  American aged 65 and older  Osteoporosis Screening: covered every 2 years if you meet one of the following conditions: (1) Have a vertebral abnormality; (2) On glucocorticoid therapy for more than 3 months; (3) Have primary hyperparathyroidism; (4) On osteoporosis medications and need to assess response to drug therapy.  HIV Screening: covered annually if you're between the age of 15-65. Also covered annually if you are younger than 15 and older than 65 with risk factors for HIV infection. For pregnant patients, it is covered up to 3 times per pregnancy.    Immunizations:  Immunization Recommendations   Influenza Vaccine Annual influenza vaccination during flu season is recommended for all persons aged >= 6 months who do not have contraindications   Pneumococcal Vaccine   * Pneumococcal conjugate vaccine = PCV13 (Prevnar 13), PCV15 (Vaxneuvance), PCV20 (Prevnar 20)  * Pneumococcal  polysaccharide vaccine = PPSV23 (Pneumovax) Adults 19-65 yo with certain risk factors or if 65+ yo  If never received any pneumonia vaccine: recommend Prevnar 20 (PCV20)  Give PCV20 if previously received 1 dose of PCV13 or PPSV23   Hepatitis B Vaccine 3 dose series if at intermediate or high risk (ex: diabetes, end stage renal disease, liver disease)   Respiratory syncytial virus (RSV) Vaccine - COVERED BY MEDICARE PART D  * RSVPreF3 (Arexvy) CDC recommends that adults 60 years of age and older may receive a single dose of RSV vaccine using shared clinical decision-making (SCDM)   Tetanus (Td) Vaccine - COST NOT COVERED BY MEDICARE PART B Following completion of primary series, a booster dose should be given every 10 years to maintain immunity against tetanus. Td may also be given as tetanus wound prophylaxis.   Tdap Vaccine - COST NOT COVERED BY MEDICARE PART B Recommended at least once for all adults. For pregnant patients, recommended with each pregnancy.   Shingles Vaccine (Shingrix) - COST NOT COVERED BY MEDICARE PART B  2 shot series recommended in those 19 years and older who have or will have weakened immune systems or those 50 years and older     Health Maintenance Due:      Topic Date Due   • Colorectal Cancer Screening  03/28/2024   • Lung Cancer Screening  11/02/2024   • Hepatitis C Screening  Completed     Immunizations Due:      Topic Date Due   • COVID-19 Vaccine (6 - 2023-24 season) 09/01/2023     Advance Directives   What are advance directives?  Advance directives are legal documents that state your wishes and plans for medical care. These plans are made ahead of time in case you lose your ability to make decisions for yourself. Advance directives can apply to any medical decision, such as the treatments you want, and if you want to donate organs.   What are the types of advance directives?  There are many types of advance directives, and each state has rules about how to use them. You may choose  a combination of any of the following:  Living will:  This is a written record of the treatment you want. You can also choose which treatments you do not want, which to limit, and which to stop at a certain time. This includes surgery, medicine, IV fluid, and tube feedings.   Durable power of  for healthcare (DPAHC):  This is a written record that states who you want to make healthcare choices for you when you are unable to make them for yourself. This person, called a proxy, is usually a family member or a friend. You may choose more than 1 proxy.  Do not resuscitate (DNR) order:  A DNR order is used in case your heart stops beating or you stop breathing. It is a request not to have certain forms of treatment, such as CPR. A DNR order may be included in other types of advance directives.  Medical directive:  This covers the care that you want if you are in a coma, near death, or unable to make decisions for yourself. You can list the treatments you want for each condition. Treatment may include pain medicine, surgery, blood transfusions, dialysis, IV or tube feedings, and a ventilator (breathing machine).  Values history:  This document has questions about your views, beliefs, and how you feel and think about life. This information can help others choose the care that you would choose.  Why are advance directives important?  An advance directive helps you control your care. Although spoken wishes may be used, it is better to have your wishes written down. Spoken wishes can be misunderstood, or not followed. Treatments may be given even if you do not want them. An advance directive may make it easier for your family to make difficult choices about your care.   Fall Prevention    Fall prevention  includes ways to make your home and other areas safer. It also includes ways you can move more carefully to prevent a fall. Health conditions that cause changes in your blood pressure, vision, or muscle strength and  coordination may increase your risk for falls. Medicines may also increase your risk for falls if they make you dizzy, weak, or sleepy.   Fall prevention tips:   Stand or sit up slowly.    Use assistive devices as directed.    Wear shoes that fit well and have soles that .    Wear a personal alarm.    Stay active.    Manage your medical conditions.    Home Safety Tips:  Add items to prevent falls in the bathroom.    Keep paths clear.    Install bright lights in your home.    Keep items you use often on shelves within reach.    Paint or place reflective tape on the edges of your stairs.    Cigarette Smoking and Your Health   Risks to your health if you smoke:  Nicotine and other chemicals found in tobacco damage every cell in your body. Even if you are a light smoker, you have an increased risk for cancer, heart disease, and lung disease. If you are pregnant or have diabetes, smoking increases your risk for complications.   Benefits to your health if you stop smoking:   You decrease respiratory symptoms such as coughing, wheezing, and shortness of breath.   You reduce your risk for cancers of the lung, mouth, throat, kidney, bladder, pancreas, stomach, and cervix. If you already have cancer, you increase the benefits of chemotherapy. You also reduce your risk for cancer returning or a second cancer from developing.   You reduce your risk for heart disease, blood clots, heart attack, and stroke.   You reduce your risk for lung infections, and diseases such as pneumonia, asthma, chronic bronchitis, and emphysema.  Your circulation improves. More oxygen can be delivered to your body. If you have diabetes, you lower your risk for complications, such as kidney, artery, and eye diseases. You also lower your risk for nerve damage. Nerve damage can lead to amputations, poor vision, and blindness.  You improve your body's ability to heal and to fight infections.  For more information and support to stop smoking:    [employer].gov  Phone: 6- 709 - 592-7892  Web Address: www.[employer].CodeRyte     © Copyright [employer] 2018 Information is for End User's use only and may not be sold, redistributed or otherwise used for commercial purposes. All illustrations and images included in CareNotes® are the copyrighted property of [employer]. or [employer]    Medicare Preventive Visit Patient Instructions  Thank you for completing your Welcome to Medicare Visit or Medicare Annual Wellness Visit today. Your next wellness visit will be due in one year (6/5/2025).  The screening/preventive services that you may require over the next 5-10 years are detailed below. Some tests may not apply to you based off risk factors and/or age. Screening tests ordered at today's visit but not completed yet may show as past due. Also, please note that scanned in results may not display below.  Preventive Screenings:  Service Recommendations Previous Testing/Comments   Colorectal Cancer Screening  Colonoscopy    Fecal Occult Blood Test (FOBT)/Fecal Immunochemical Test (FIT)  Fecal DNA/Cologuard Test  Flexible Sigmoidoscopy Age: 45-75 years old   Colonoscopy: every 10 years (May be performed more frequently if at higher risk)  OR  FOBT/FIT: every 1 year  OR  Cologuard: every 3 years  OR  Sigmoidoscopy: every 5 years  Screening may be recommended earlier than age 45 if at higher risk for colorectal cancer. Also, an individualized decision between you and your healthcare provider will decide whether screening between the ages of 76-85 would be appropriate. Colonoscopy: 03/29/2023  FOBT/FIT: Not on file  Cologuard: Not on file  Sigmoidoscopy: Not on file    Screening Current     Prostate Cancer Screening Individualized decision between patient and health care provider in men between ages of 55-69   Medicare will cover every 12 months beginning on the day after your 50th birthday PSA: No results in last 5 years           Hepatitis C Screening Once for  adults born between 1945 and 1965  More frequently in patients at high risk for Hepatitis C Hep C Antibody: 05/13/2022    Screening Current   Diabetes Screening 1-2 times per year if you're at risk for diabetes or have pre-diabetes Fasting glucose: 102 mg/dL (10/17/2022)  A1C: 6.0 (6/4/2024)  Screening Not Indicated  History Diabetes   Cholesterol Screening Once every 5 years if you don't have a lipid disorder. May order more often based on risk factors. Lipid panel: 10/17/2022  Screening Not Indicated  History Lipid Disorder      Other Preventive Screenings Covered by Medicare:  Abdominal Aortic Aneurysm (AAA) Screening: covered once if your at risk. You're considered to be at risk if you have a family history of AAA or a male between the age of 65-75 who smoking at least 100 cigarettes in your lifetime.  Lung Cancer Screening: covers low dose CT scan once per year if you meet all of the following conditions: (1) Age 55-77; (2) No signs or symptoms of lung cancer; (3) Current smoker or have quit smoking within the last 15 years; (4) You have a tobacco smoking history of at least 20 pack years (packs per day x number of years you smoked); (5) You get a written order from a healthcare provider.  Glaucoma Screening: covered annually if you're considered high risk: (1) You have diabetes OR (2) Family history of glaucoma OR (3)  aged 50 and older OR (4)  American aged 65 and older  Osteoporosis Screening: covered every 2 years if you meet one of the following conditions: (1) Have a vertebral abnormality; (2) On glucocorticoid therapy for more than 3 months; (3) Have primary hyperparathyroidism; (4) On osteoporosis medications and need to assess response to drug therapy.  HIV Screening: covered annually if you're between the age of 15-65. Also covered annually if you are younger than 15 and older than 65 with risk factors for HIV infection. For pregnant patients, it is covered up to 3 times per  pregnancy.    Immunizations:  Immunization Recommendations   Influenza Vaccine Annual influenza vaccination during flu season is recommended for all persons aged >= 6 months who do not have contraindications   Pneumococcal Vaccine   * Pneumococcal conjugate vaccine = PCV13 (Prevnar 13), PCV15 (Vaxneuvance), PCV20 (Prevnar 20)  * Pneumococcal polysaccharide vaccine = PPSV23 (Pneumovax) Adults 19-63 yo with certain risk factors or if 65+ yo  If never received any pneumonia vaccine: recommend Prevnar 20 (PCV20)  Give PCV20 if previously received 1 dose of PCV13 or PPSV23   Hepatitis B Vaccine 3 dose series if at intermediate or high risk (ex: diabetes, end stage renal disease, liver disease)   Respiratory syncytial virus (RSV) Vaccine - COVERED BY MEDICARE PART D  * RSVPreF3 (Arexvy) CDC recommends that adults 60 years of age and older may receive a single dose of RSV vaccine using shared clinical decision-making (SCDM)   Tetanus (Td) Vaccine - COST NOT COVERED BY MEDICARE PART B Following completion of primary series, a booster dose should be given every 10 years to maintain immunity against tetanus. Td may also be given as tetanus wound prophylaxis.   Tdap Vaccine - COST NOT COVERED BY MEDICARE PART B Recommended at least once for all adults. For pregnant patients, recommended with each pregnancy.   Shingles Vaccine (Shingrix) - COST NOT COVERED BY MEDICARE PART B  2 shot series recommended in those 19 years and older who have or will have weakened immune systems or those 50 years and older     Health Maintenance Due:      Topic Date Due   • Colorectal Cancer Screening  03/28/2024   • Lung Cancer Screening  11/02/2024   • Hepatitis C Screening  Completed     Immunizations Due:      Topic Date Due   • COVID-19 Vaccine (6 - 2023-24 season) 09/01/2023     Advance Directives   What are advance directives?  Advance directives are legal documents that state your wishes and plans for medical care. These plans are made ahead  of time in case you lose your ability to make decisions for yourself. Advance directives can apply to any medical decision, such as the treatments you want, and if you want to donate organs.   What are the types of advance directives?  There are many types of advance directives, and each state has rules about how to use them. You may choose a combination of any of the following:  Living will:  This is a written record of the treatment you want. You can also choose which treatments you do not want, which to limit, and which to stop at a certain time. This includes surgery, medicine, IV fluid, and tube feedings.   Durable power of  for healthcare (DPAHC):  This is a written record that states who you want to make healthcare choices for you when you are unable to make them for yourself. This person, called a proxy, is usually a family member or a friend. You may choose more than 1 proxy.  Do not resuscitate (DNR) order:  A DNR order is used in case your heart stops beating or you stop breathing. It is a request not to have certain forms of treatment, such as CPR. A DNR order may be included in other types of advance directives.  Medical directive:  This covers the care that you want if you are in a coma, near death, or unable to make decisions for yourself. You can list the treatments you want for each condition. Treatment may include pain medicine, surgery, blood transfusions, dialysis, IV or tube feedings, and a ventilator (breathing machine).  Values history:  This document has questions about your views, beliefs, and how you feel and think about life. This information can help others choose the care that you would choose.  Why are advance directives important?  An advance directive helps you control your care. Although spoken wishes may be used, it is better to have your wishes written down. Spoken wishes can be misunderstood, or not followed. Treatments may be given even if you do not want them. An advance  directive may make it easier for your family to make difficult choices about your care.   Fall Prevention    Fall prevention  includes ways to make your home and other areas safer. It also includes ways you can move more carefully to prevent a fall. Health conditions that cause changes in your blood pressure, vision, or muscle strength and coordination may increase your risk for falls. Medicines may also increase your risk for falls if they make you dizzy, weak, or sleepy.   Fall prevention tips:   Stand or sit up slowly.    Use assistive devices as directed.    Wear shoes that fit well and have soles that .    Wear a personal alarm.    Stay active.    Manage your medical conditions.    Home Safety Tips:  Add items to prevent falls in the bathroom.    Keep paths clear.    Install bright lights in your home.    Keep items you use often on shelves within reach.    Paint or place reflective tape on the edges of your stairs.    Cigarette Smoking and Your Health   Risks to your health if you smoke:  Nicotine and other chemicals found in tobacco damage every cell in your body. Even if you are a light smoker, you have an increased risk for cancer, heart disease, and lung disease. If you are pregnant or have diabetes, smoking increases your risk for complications.   Benefits to your health if you stop smoking:   You decrease respiratory symptoms such as coughing, wheezing, and shortness of breath.   You reduce your risk for cancers of the lung, mouth, throat, kidney, bladder, pancreas, stomach, and cervix. If you already have cancer, you increase the benefits of chemotherapy. You also reduce your risk for cancer returning or a second cancer from developing.   You reduce your risk for heart disease, blood clots, heart attack, and stroke.   You reduce your risk for lung infections, and diseases such as pneumonia, asthma, chronic bronchitis, and emphysema.  Your circulation improves. More oxygen can be delivered to your  body. If you have diabetes, you lower your risk for complications, such as kidney, artery, and eye diseases. You also lower your risk for nerve damage. Nerve damage can lead to amputations, poor vision, and blindness.  You improve your body's ability to heal and to fight infections.  For more information and support to stop smoking:   Smokefree.gov  Phone: 8- 228 - 002-2302  Web Address: www.[employer].1000memories     © Copyright [employer] 2018 Information is for End User's use only and may not be sold, redistributed or otherwise used for commercial purposes. All illustrations and images included in CareNotes® are the copyrighted property of [employer].D.A.M., Inc. or "[employer]

## 2024-06-04 NOTE — ASSESSMENT & PLAN NOTE
Patient has recurrent depression.  He feels that his moods are stable.  He follows with psychiatry closely.  Continue bupropion and zoloft

## 2024-06-26 ENCOUNTER — TELEPHONE (OUTPATIENT)
Age: 69
End: 2024-06-26

## 2024-06-26 NOTE — TELEPHONE ENCOUNTER
Bety, Director, [facility] ([phone]; ext. [phone]) called to make a memory assessment appointment for the patient.    Patient resides in [facility]; independent care and was referred to office for evaluation.    Attempted to schedule appointments, but decision tree directed to office to complete scheduling due to behavior management medications and possible history of phychiatric hospitalization.    Attempted to warm transfer call; with no answer.    Director is expecting a return call to complete scheduling.

## 2024-07-05 ENCOUNTER — CONSULT (OUTPATIENT)
Dept: SURGERY | Facility: HOSPITAL | Age: 69
End: 2024-07-05
Attending: INTERNAL MEDICINE
Payer: MEDICARE

## 2024-07-05 VITALS
HEART RATE: 66 BPM | DIASTOLIC BLOOD PRESSURE: 76 MMHG | WEIGHT: 162.6 LBS | SYSTOLIC BLOOD PRESSURE: 114 MMHG | TEMPERATURE: 96.3 F | BODY MASS INDEX: 19.8 KG/M2 | HEIGHT: 76 IN

## 2024-07-05 DIAGNOSIS — E89.1 POSTPROCEDURAL HYPOINSULINEMIA: ICD-10-CM

## 2024-07-05 DIAGNOSIS — K42.9 UMBILICAL HERNIA WITHOUT OBSTRUCTION AND WITHOUT GANGRENE: Primary | ICD-10-CM

## 2024-07-05 PROCEDURE — 99203 OFFICE O/P NEW LOW 30 MIN: CPT | Performed by: PHYSICIAN ASSISTANT

## 2024-07-05 RX ORDER — CEFAZOLIN SODIUM 1 G/50ML
1000 SOLUTION INTRAVENOUS ONCE
OUTPATIENT
Start: 2024-07-16 | End: 2024-07-16

## 2024-07-05 RX ORDER — SODIUM CHLORIDE, SODIUM LACTATE, POTASSIUM CHLORIDE, CALCIUM CHLORIDE 600; 310; 30; 20 MG/100ML; MG/100ML; MG/100ML; MG/100ML
125 INJECTION, SOLUTION INTRAVENOUS CONTINUOUS
OUTPATIENT
Start: 2024-07-16

## 2024-07-05 NOTE — H&P (VIEW-ONLY)
Assessment/Plan:   Bryce Silver is a 69 y.o.male who is here for Consult (CONSULTS// UMBILICAL HERNIA//PT is here for a consult regarding an umbilical hernia he has had for about 1 year. Denies any pain, it is a visible bulge, and all normal habits. Was last seen 3/18/2024.)    Umbilical hernia  -reducible with small palpable approximate 1.5 cm defect, thin overlying skin, no skin breakdown  -Discussed open repair of umbilical hernia with possible mesh.  Procedure discussed in detail with the patient as well as possible risks and complications and written consent has been obtained.  All questions answered.  Will schedule surgery at patient's convenience.  -Patient will require preadmission testing to include labs, chest x-ray, EKG. all ordered today.  Results will be reviewed prior to surgical intervention    Tobacco abuse  -Cessation recommended  -Discussed that smoking can interfere with wound healing and hernia recurrence    DM 2  -HGB A!C 6.0 6/4/2024  -Continue tight blood sugar control for adequate wound healing    Psychiatric disorder  -Continue home medications  -Patient is a resident of a group home.  He does sign his own consent forms.  Staff member present at appointment to confirm information        HPI:  Bryce Silver is a 69 y.o.male who was referred for evaluation of Consult (CONSULTS// UMBILICAL HERNIA//PT is here for a consult regarding an umbilical hernia he has had for about 1 year. Denies any pain, it is a visible bulge, and all normal habits. Was last seen 3/18/2024.)    Patient with known history of umbilical hernia.  Present for approximately 1 year.  No with increasing bulge at area.  Denies associated pain.  No difficulty with nausea or vomiting.  No difficulty with bowel movements.  Patient is interested in surgical repair.    No prior abdominal surgery.  No prior history of anesthesia complications.  No anticoagulation medication.    ROS:  General ROS: negative  negative for - chills,  fatigue, fever or night sweats, weight loss  Respiratory ROS: no cough, shortness of breath, or wheezing  Cardiovascular ROS: no chest pain or dyspnea on exertion  Abdomen ROS: as per HPI  Genito-Urinary ROS: no dysuria, trouble voiding, or hematuria  Musculoskeletal ROS: negative for - gait disturbance, joint pain or muscle pain  Neurological ROS: no TIA or stroke symptoms  Skin ROS: no rashes, lesions, open wounds      ALLERGIES  Patient has no known allergies.    Current Outpatient Medications:     acetaminophen (TYLENOL) 500 mg tablet, Take 1 tablet (500 mg total) by mouth every 8 (eight) hours as needed for mild pain or headaches, Disp: 90 tablet, Rfl: 1    albuterol (PROVENTIL HFA,VENTOLIN HFA) 90 mcg/act inhaler, Inhale 2 puffs every 6 (six) hours as needed for wheezing, Disp: 8 g, Rfl: 5    ASPIRIN 81 PO, Take by mouth 1  daily , Disp: , Rfl:     b complex vitamins capsule, Take 1 capsule by mouth daily, Disp: 30 capsule, Rfl: 6    benztropine (COGENTIN) 1 mg tablet, 1 twice daily and 1 as needed for EPS, Disp: , Rfl:     buPROPion (WELLBUTRIN XL) 300 mg 24 hr tablet, Take 300 mg by mouth daily, Disp: , Rfl:     Cholecalciferol (VITAMIN D3) 5000 units CAPS, Take by mouth 1 daily, Disp: , Rfl:     folic acid (FOLVITE) 1 mg tablet, Take 1 tablet (1 mg total) by mouth daily, Disp: 30 tablet, Rfl: 6    haloperidol (HALDOL) 10 mg tablet, 1 tab bid, Disp: , Rfl:     Lidocaine Viscous HCl (XYLOCAINE) 2 % mucosal solution, Swish and spit 15 mL 3 (three) times a day as needed for mouth pain or discomfort, Disp: 100 mL, Rfl: 3    lurasidone (LATUDA) 120 mg tablet, Take by mouth 1 daily after dinner, Disp: , Rfl:     meclizine (ANTIVERT) 12.5 MG tablet, 1 bid as needed for nausea, Disp: , Rfl:     meloxicam (MOBIC) 15 mg tablet, 1 daily prn, Disp: , Rfl:     Menthol-Methyl Salicylate (JOSE ANGEL GUZMÁN GREASELESS) 10-15 % greaseless cream, Apply topically 4 (four) times a day as needed for muscle/joint pain, Disp: 30 g, Rfl: 3     metFORMIN (GLUCOPHAGE) 500 mg tablet, Take 500 mg by mouth 2 (two) times a day with meals, Disp: , Rfl:     Multiple Vitamins-Minerals (MULTIVITAMIN WITH MINERALS) tablet, Take 1 tablet by mouth daily, Disp: , Rfl:     nicotine polacrilex (COMMIT) 2 MG lozenge, Apply 1 lozenge (2 mg total) to the mouth or throat every 3 (three) hours as needed for smoking cessation, Disp: 168 lozenge, Rfl: 1    Omega-3 Fatty Acids (fish oil) 1,000 mg, Take 1,000 mg by mouth daily, Disp: , Rfl:     omeprazole (PriLOSEC) 40 MG capsule, Take 1 capsule (40 mg total) by mouth daily, Disp: 90 capsule, Rfl: 3    paliperidone (INVEGA) 6 MG 24 hr tablet, 1 at hs, Disp: , Rfl:     polyethylene glycol (MIRALAX) 17 g packet, Take 17 g by mouth daily, Disp: 14 each, Rfl: 3    pravastatin (PRAVACHOL) 40 mg tablet, Take 1 tablet (40 mg total) by mouth daily at bedtime, Disp: 30 tablet, Rfl: 5    sertraline (ZOLOFT) 100 mg tablet, 1 daily, Disp: , Rfl:     tamsulosin (FLOMAX) 0.4 mg, Take 1 capsule (0.4 mg total) by mouth daily with dinner, Disp: 90 capsule, Rfl: 3    Naloxone HCl (NARCAN NA), into each nostril Administer nasally when suspected overdose (Patient not taking: Reported on 2/15/2023), Disp: , Rfl:   Past Medical History:   Diagnosis Date    Atopic rhinitis     BPH with urinary obstruction     COPD (chronic obstructive pulmonary disease) (HCC)     Hyperlipidemia     Incomplete bladder emptying     Schizophrenia (HCC)     Urinary retention      Past Surgical History:   Procedure Laterality Date    RIB FRACTURE SURGERY       Family History   Problem Relation Age of Onset    Heart attack Father     Dementia Mother     Substance Abuse Sister         cigarettes    Substance Abuse Brother     Alcohol abuse Brother       reports that he has been smoking cigarettes. He has a 45 pack-year smoking history. He has never used smokeless tobacco. He reports that he does not drink alcohol and does not use drugs.    PHYSICAL EXAM  Vitals:    07/05/24  1033   BP: 114/76   Pulse: 66   Temp: (!) 96.3 °F (35.7 °C)     Weight (last 2 days)       Date/Time Weight    07/05/24 1033 73.8 (162.6)            General Appearance:    Alert, cooperative, no distress, answers questions appropriately   Head:    Normocephalic without obvious abnormality   Eyes:    Conjunctiva/corneas clear, EOM's intact        Neck:   Supple, no adenopathy, no JVD   Back:     Symmetric, no spinal or CVA tenderness   Lungs:     Distant BS, few wheezes, no rhonchi or rales   Heart:    Regular rate and rhythm, S1 and S2 normal, no murmur   Abdomen:     Flat, soft, NT, active BS  Umbilical bulge with thin skin, easily reducible with palpable approximate 1.5 cm defect   Extremities:   Extremities normal. No clubbing, cyanosis or edema   Psych:   Normal Affect, AOx3.    Neurologic:  Skin:   CNII-XII intact. Strength symmetric, speech intact    Warm, dry, intact, no visible rashes or lesions           Tammy Rondon

## 2024-07-11 ENCOUNTER — HOSPITAL ENCOUNTER (OUTPATIENT)
Dept: RADIOLOGY | Facility: HOSPITAL | Age: 69
End: 2024-07-11
Payer: MEDICARE

## 2024-07-11 ENCOUNTER — OFFICE VISIT (OUTPATIENT)
Dept: LAB | Facility: HOSPITAL | Age: 69
End: 2024-07-11
Payer: MEDICARE

## 2024-07-11 DIAGNOSIS — K42.9 UMBILICAL HERNIA WITHOUT OBSTRUCTION AND WITHOUT GANGRENE: ICD-10-CM

## 2024-07-11 PROCEDURE — 71046 X-RAY EXAM CHEST 2 VIEWS: CPT

## 2024-07-11 PROCEDURE — 93005 ELECTROCARDIOGRAM TRACING: CPT

## 2024-07-12 LAB
ATRIAL RATE: 69 BPM
HBA1C MFR BLD HPLC: 6.3 %
P AXIS: 80 DEGREES
PR INTERVAL: 168 MS
QRS AXIS: 49 DEGREES
QRSD INTERVAL: 88 MS
QT INTERVAL: 398 MS
QTC INTERVAL: 426 MS
T WAVE AXIS: 70 DEGREES
VENTRICULAR RATE: 69 BPM

## 2024-07-12 PROCEDURE — 93010 ELECTROCARDIOGRAM REPORT: CPT | Performed by: INTERNAL MEDICINE

## 2024-07-12 NOTE — PRE-PROCEDURE INSTRUCTIONS
Pre-Surgery Instructions:   Medication Instructions    acetaminophen (TYLENOL) 500 mg tablet Uses PRN- OK to take day of surgery    albuterol (PROVENTIL HFA,VENTOLIN HFA) 90 mcg/act inhaler Uses PRN- OK to take day of surgery    ASPIRIN 81 PO Instructions provided by MD    b complex vitamins capsule Hold day of surgery.    benztropine (COGENTIN) 1 mg tablet Take day of surgery.    buPROPion (WELLBUTRIN XL) 300 mg 24 hr tablet Take day of surgery.    Cholecalciferol (VITAMIN D3) 5000 units CAPS Hold day of surgery.    folic acid (FOLVITE) 1 mg tablet Hold day of surgery.    haloperidol (HALDOL) 10 mg tablet Take day of surgery.    lurasidone (LATUDA) 120 mg tablet Take night before surgery    meclizine (ANTIVERT) 12.5 MG tablet Uses PRN- OK to take day of surgery    meloxicam (MOBIC) 15 mg tablet Stop taking 3 days prior to surgery.    Menthol-Methyl Salicylate (JOSE ANGEL GUZMÁN GREASELESS) 10-15 % greaseless cream Hold day of surgery.    metFORMIN (GLUCOPHAGE) 500 mg tablet Hold day of surgery.    Multiple Vitamins-Minerals (MULTIVITAMIN WITH MINERALS) tablet Stop taking 7 days prior to surgery.    Naloxone HCl (NARCAN NA) Uses PRN- OK to take day of surgery    nicotine polacrilex (COMMIT) 2 MG lozenge Hold day of surgery.    Omega-3 Fatty Acids (fish oil) 1,000 mg Stop taking 7 days prior to surgery.    omeprazole (PriLOSEC) 40 MG capsule Take day of surgery.    paliperidone (INVEGA) 6 MG 24 hr tablet Take night before surgery    polyethylene glycol (MIRALAX) 17 g packet Hold day of surgery.    pravastatin (PRAVACHOL) 40 mg tablet Take night before surgery    sertraline (ZOLOFT) 100 mg tablet Take day of surgery.    tamsulosin (FLOMAX) 0.4 mg Take night before surgery        Medication instructions for day surgery reviewed. Please use only a sip of water to take your instructed medications. Avoid all over the counter vitamins, supplements and NSAIDS for one week prior to surgery per anesthesia guidelines. Tylenol is ok to  take as needed.     You will receive a call one business day prior to surgery with an arrival time and hospital directions. If your surgery is scheduled on a Monday, the hospital will be calling you on the Friday prior to your surgery. If you have not heard from anyone by 8pm, please call the hospital supervisor through the hospital  at [phone]. ([place] [phone] or [place] [phone]).    Do not eat or drink anything after midnight the night before your surgery, including candy, mints, lifesavers, or chewing gum. Do not drink alcohol 24hrs before your surgery. Try not to smoke at least 24hrs before your surgery.       Follow the pre surgery showering instructions as listed in the “My Surgical Experience Booklet” or otherwise provided by your surgeon's office. Do not use a blade to shave the surgical area 1 week before surgery. It is okay to use a clean electric clippers up to 24 hours before surgery. Do not apply any lotions, creams, including makeup, cologne, deodorant, or perfumes after showering on the day of your surgery. Do not use dry shampoo, hair spray, hair gel, or any type of hair products.     No contact lenses, eye make-up, or artificial eyelashes. Remove nail polish, including gel polish, and any artificial, gel, or acrylic nails if possible. Remove all jewelry including rings and body piercing jewelry.     Wear causal clothing that is easy to take on and off. Consider your type of surgery.    Keep any valuables, jewelry, piercings at home. Please bring any specially ordered equipment (sling, braces) if indicated.    Arrange for a responsible person to drive you to and from the hospital on the day of your surgery. Please confirm the visitor policy for the day of your procedure when you receive your phone call with an arrival time.     Call the surgeon's office with any new illnesses, exposures, or additional questions prior to surgery.    Please reference your “My Surgical  Experience Booklet” for additional information to prepare for your upcoming surgery.

## 2024-07-13 ENCOUNTER — ANESTHESIA EVENT (OUTPATIENT)
Dept: PERIOP | Facility: HOSPITAL | Age: 69
End: 2024-07-13
Payer: MEDICARE

## 2024-07-16 ENCOUNTER — ANESTHESIA (OUTPATIENT)
Dept: PERIOP | Facility: HOSPITAL | Age: 69
End: 2024-07-16
Payer: MEDICARE

## 2024-07-16 ENCOUNTER — HOSPITAL ENCOUNTER (OUTPATIENT)
Facility: HOSPITAL | Age: 69
Setting detail: OUTPATIENT SURGERY
Discharge: HOME | End: 2024-07-16
Attending: SURGERY | Admitting: SURGERY
Payer: MEDICARE

## 2024-07-16 VITALS
TEMPERATURE: 97.5 F | RESPIRATION RATE: 22 BRPM | SYSTOLIC BLOOD PRESSURE: 131 MMHG | BODY MASS INDEX: 19.87 KG/M2 | DIASTOLIC BLOOD PRESSURE: 77 MMHG | WEIGHT: 163.14 LBS | OXYGEN SATURATION: 93 % | HEIGHT: 76 IN | HEART RATE: 76 BPM

## 2024-07-16 DIAGNOSIS — Z87.19 S/P HERNIA REPAIR: Primary | ICD-10-CM

## 2024-07-16 DIAGNOSIS — Z98.890 S/P HERNIA REPAIR: Primary | ICD-10-CM

## 2024-07-16 LAB
ANION GAP SERPL CALCULATED.3IONS-SCNC: 8 MMOL/L (ref 4–13)
BASOPHILS # BLD AUTO: 0.07 THOUSANDS/ÂΜL (ref 0–0.1)
BASOPHILS NFR BLD AUTO: 1 % (ref 0–1)
BUN SERPL-MCNC: 11 MG/DL (ref 5–25)
CALCIUM SERPL-MCNC: 10 MG/DL (ref 8.4–10.2)
CHLORIDE SERPL-SCNC: 105 MMOL/L (ref 96–108)
CO2 SERPL-SCNC: 25 MMOL/L (ref 21–32)
CREAT SERPL-MCNC: 0.83 MG/DL (ref 0.6–1.3)
EOSINOPHIL # BLD AUTO: 0.26 THOUSAND/ÂΜL (ref 0–0.61)
EOSINOPHIL NFR BLD AUTO: 4 % (ref 0–6)
ERYTHROCYTE [DISTWIDTH] IN BLOOD BY AUTOMATED COUNT: 13.8 % (ref 11.6–15.1)
GFR SERPL CREATININE-BSD FRML MDRD: 90 ML/MIN/1.73SQ M
GLUCOSE P FAST SERPL-MCNC: 109 MG/DL (ref 65–99)
GLUCOSE SERPL-MCNC: 109 MG/DL (ref 65–140)
GLUCOSE SERPL-MCNC: 115 MG/DL (ref 65–140)
HCT VFR BLD AUTO: 37.9 % (ref 36.5–49.3)
HGB BLD-MCNC: 12.6 G/DL (ref 12–17)
IMM GRANULOCYTES # BLD AUTO: 0.03 THOUSAND/UL (ref 0–0.2)
IMM GRANULOCYTES NFR BLD AUTO: 1 % (ref 0–2)
LYMPHOCYTES # BLD AUTO: 1.52 THOUSANDS/ÂΜL (ref 0.6–4.47)
LYMPHOCYTES NFR BLD AUTO: 24 % (ref 14–44)
MCH RBC QN AUTO: 29.6 PG (ref 26.8–34.3)
MCHC RBC AUTO-ENTMCNC: 33.2 G/DL (ref 31.4–37.4)
MCV RBC AUTO: 89 FL (ref 82–98)
MONOCYTES # BLD AUTO: 0.74 THOUSAND/ÂΜL (ref 0.17–1.22)
MONOCYTES NFR BLD AUTO: 12 % (ref 4–12)
NEUTROPHILS # BLD AUTO: 3.81 THOUSANDS/ÂΜL (ref 1.85–7.62)
NEUTS SEG NFR BLD AUTO: 58 % (ref 43–75)
NRBC BLD AUTO-RTO: 0 /100 WBCS
PLATELET # BLD AUTO: 282 THOUSANDS/UL (ref 149–390)
PMV BLD AUTO: 10 FL (ref 8.9–12.7)
POTASSIUM SERPL-SCNC: 4.1 MMOL/L (ref 3.5–5.3)
RBC # BLD AUTO: 4.26 MILLION/UL (ref 3.88–5.62)
SODIUM SERPL-SCNC: 138 MMOL/L (ref 135–147)
WBC # BLD AUTO: 6.43 THOUSAND/UL (ref 4.31–10.16)

## 2024-07-16 PROCEDURE — 80048 BASIC METABOLIC PNL TOTAL CA: CPT | Performed by: ANESTHESIOLOGY

## 2024-07-16 PROCEDURE — 49591 RPR AA HRN 1ST < 3 CM RDC: CPT | Performed by: PHYSICIAN ASSISTANT

## 2024-07-16 PROCEDURE — 49591 RPR AA HRN 1ST < 3 CM RDC: CPT | Performed by: SURGERY

## 2024-07-16 PROCEDURE — 82948 REAGENT STRIP/BLOOD GLUCOSE: CPT

## 2024-07-16 PROCEDURE — 85025 COMPLETE CBC W/AUTO DIFF WBC: CPT | Performed by: ANESTHESIOLOGY

## 2024-07-16 RX ORDER — FENTANYL CITRATE 50 UG/ML
INJECTION, SOLUTION INTRAMUSCULAR; INTRAVENOUS AS NEEDED
Status: DISCONTINUED | OUTPATIENT
Start: 2024-07-16 | End: 2024-07-16

## 2024-07-16 RX ORDER — OXYCODONE HYDROCHLORIDE 5 MG/1
10 TABLET ORAL EVERY 4 HOURS PRN
Status: DISCONTINUED | OUTPATIENT
Start: 2024-07-16 | End: 2024-07-16 | Stop reason: HOSPADM

## 2024-07-16 RX ORDER — ROCURONIUM BROMIDE 10 MG/ML
INJECTION, SOLUTION INTRAVENOUS AS NEEDED
Status: DISCONTINUED | OUTPATIENT
Start: 2024-07-16 | End: 2024-07-16

## 2024-07-16 RX ORDER — ONDANSETRON 2 MG/ML
4 INJECTION INTRAMUSCULAR; INTRAVENOUS ONCE AS NEEDED
Status: DISCONTINUED | OUTPATIENT
Start: 2024-07-16 | End: 2024-07-16 | Stop reason: HOSPADM

## 2024-07-16 RX ORDER — OXYCODONE HYDROCHLORIDE 5 MG/1
5 TABLET ORAL EVERY 4 HOURS PRN
Status: DISCONTINUED | OUTPATIENT
Start: 2024-07-16 | End: 2024-07-16 | Stop reason: HOSPADM

## 2024-07-16 RX ORDER — PHENYLEPHRINE HCL IN 0.9% NACL 1 MG/10 ML
SYRINGE (ML) INTRAVENOUS AS NEEDED
Status: DISCONTINUED | OUTPATIENT
Start: 2024-07-16 | End: 2024-07-16

## 2024-07-16 RX ORDER — FENTANYL CITRATE/PF 50 MCG/ML
25 SYRINGE (ML) INJECTION
Status: DISCONTINUED | OUTPATIENT
Start: 2024-07-16 | End: 2024-07-16 | Stop reason: HOSPADM

## 2024-07-16 RX ORDER — ONDANSETRON 2 MG/ML
4 INJECTION INTRAMUSCULAR; INTRAVENOUS EVERY 6 HOURS PRN
Status: DISCONTINUED | OUTPATIENT
Start: 2024-07-16 | End: 2024-07-16 | Stop reason: HOSPADM

## 2024-07-16 RX ORDER — LIDOCAINE HYDROCHLORIDE 10 MG/ML
INJECTION, SOLUTION EPIDURAL; INFILTRATION; INTRACAUDAL; PERINEURAL AS NEEDED
Status: DISCONTINUED | OUTPATIENT
Start: 2024-07-16 | End: 2024-07-16

## 2024-07-16 RX ORDER — CEFAZOLIN SODIUM 1 G/50ML
1000 SOLUTION INTRAVENOUS ONCE
Status: COMPLETED | OUTPATIENT
Start: 2024-07-16 | End: 2024-07-16

## 2024-07-16 RX ORDER — ONDANSETRON 2 MG/ML
INJECTION INTRAMUSCULAR; INTRAVENOUS AS NEEDED
Status: DISCONTINUED | OUTPATIENT
Start: 2024-07-16 | End: 2024-07-16

## 2024-07-16 RX ORDER — HYDROMORPHONE HCL IN WATER/PF 6 MG/30 ML
0.2 PATIENT CONTROLLED ANALGESIA SYRINGE INTRAVENOUS
Status: DISCONTINUED | OUTPATIENT
Start: 2024-07-16 | End: 2024-07-16 | Stop reason: HOSPADM

## 2024-07-16 RX ORDER — SODIUM CHLORIDE, SODIUM LACTATE, POTASSIUM CHLORIDE, CALCIUM CHLORIDE 600; 310; 30; 20 MG/100ML; MG/100ML; MG/100ML; MG/100ML
100 INJECTION, SOLUTION INTRAVENOUS CONTINUOUS
Status: DISCONTINUED | OUTPATIENT
Start: 2024-07-16 | End: 2024-07-16 | Stop reason: HOSPADM

## 2024-07-16 RX ORDER — OXYCODONE HYDROCHLORIDE 5 MG/1
5 TABLET ORAL EVERY 6 HOURS PRN
Qty: 10 TABLET | Refills: 0 | Status: SHIPPED | OUTPATIENT
Start: 2024-07-16 | End: 2024-07-19

## 2024-07-16 RX ORDER — SODIUM CHLORIDE, SODIUM LACTATE, POTASSIUM CHLORIDE, CALCIUM CHLORIDE 600; 310; 30; 20 MG/100ML; MG/100ML; MG/100ML; MG/100ML
INJECTION, SOLUTION INTRAVENOUS CONTINUOUS PRN
Status: DISCONTINUED | OUTPATIENT
Start: 2024-07-16 | End: 2024-07-16

## 2024-07-16 RX ORDER — ALBUTEROL SULFATE 2.5 MG/3ML
2.5 SOLUTION RESPIRATORY (INHALATION) ONCE AS NEEDED
Status: DISCONTINUED | OUTPATIENT
Start: 2024-07-16 | End: 2024-07-16 | Stop reason: HOSPADM

## 2024-07-16 RX ORDER — ACETAMINOPHEN 325 MG/1
650 TABLET ORAL EVERY 6 HOURS PRN
Status: DISCONTINUED | OUTPATIENT
Start: 2024-07-16 | End: 2024-07-16 | Stop reason: HOSPADM

## 2024-07-16 RX ORDER — SODIUM CHLORIDE, SODIUM LACTATE, POTASSIUM CHLORIDE, CALCIUM CHLORIDE 600; 310; 30; 20 MG/100ML; MG/100ML; MG/100ML; MG/100ML
125 INJECTION, SOLUTION INTRAVENOUS CONTINUOUS
Status: DISCONTINUED | OUTPATIENT
Start: 2024-07-16 | End: 2024-07-16 | Stop reason: HOSPADM

## 2024-07-16 RX ORDER — PROPOFOL 10 MG/ML
INJECTION, EMULSION INTRAVENOUS AS NEEDED
Status: DISCONTINUED | OUTPATIENT
Start: 2024-07-16 | End: 2024-07-16

## 2024-07-16 RX ORDER — DEXAMETHASONE SODIUM PHOSPHATE 10 MG/ML
INJECTION, SOLUTION INTRAMUSCULAR; INTRAVENOUS AS NEEDED
Status: DISCONTINUED | OUTPATIENT
Start: 2024-07-16 | End: 2024-07-16

## 2024-07-16 RX ADMIN — CEFAZOLIN SODIUM 1000 MG: 1 SOLUTION INTRAVENOUS at 10:31

## 2024-07-16 RX ADMIN — ROCURONIUM BROMIDE 50 MG: 10 INJECTION INTRAVENOUS at 10:36

## 2024-07-16 RX ADMIN — ONDANSETRON 4 MG: 2 INJECTION INTRAMUSCULAR; INTRAVENOUS at 10:36

## 2024-07-16 RX ADMIN — SODIUM CHLORIDE, SODIUM LACTATE, POTASSIUM CHLORIDE, AND CALCIUM CHLORIDE: .6; .31; .03; .02 INJECTION, SOLUTION INTRAVENOUS at 10:22

## 2024-07-16 RX ADMIN — FENTANYL CITRATE 100 MCG: 50 INJECTION INTRAMUSCULAR; INTRAVENOUS at 10:36

## 2024-07-16 RX ADMIN — SODIUM CHLORIDE, SODIUM LACTATE, POTASSIUM CHLORIDE, AND CALCIUM CHLORIDE 125 ML/HR: .6; .31; .03; .02 INJECTION, SOLUTION INTRAVENOUS at 09:58

## 2024-07-16 RX ADMIN — DEXAMETHASONE SODIUM PHOSPHATE 10 MG: 10 INJECTION, SOLUTION INTRAMUSCULAR; INTRAVENOUS at 10:36

## 2024-07-16 RX ADMIN — SUGAMMADEX 200 MG: 100 INJECTION, SOLUTION INTRAVENOUS at 10:59

## 2024-07-16 RX ADMIN — Medication 200 MCG: at 10:49

## 2024-07-16 RX ADMIN — LIDOCAINE HYDROCHLORIDE 50 MG: 10 INJECTION, SOLUTION EPIDURAL; INFILTRATION; INTRACAUDAL; PERINEURAL at 10:36

## 2024-07-16 RX ADMIN — PROPOFOL 120 MG: 10 INJECTION, EMULSION INTRAVENOUS at 10:36

## 2024-07-16 NOTE — ANESTHESIA PREPROCEDURE EVALUATION
Procedure:  REPAIR HERNIA UMBILICAL (Abdomen)    Relevant Problems   CARDIO   (+) Mixed hyperlipidemia      ENDO   (+) Type 2 diabetes mellitus without complication, without long-term current use of insulin (HCC)      NEURO/PSYCH   (+) Mild episode of recurrent major depressive disorder ([facility])   (+) Schizophrenia (HCC)      PULMONARY   (+) COPD (chronic obstructive pulmonary disease) ([facility])   (+) Smoker    GERD  Previous trach (years ago) from trauma        Physical Exam    Airway    Mallampati score: II  TM Distance: <3 FB  Neck ROM: full     Dental   Comment: No loose teeth     Cardiovascular      Pulmonary      Other Findings          EKG (7/2024)  Normal sinus rhythm  Normal ECG  When compared with ECG of 23-FEB-2023 11:10,  No significant change was found      Anesthesia Plan  ASA Score- 3     Anesthesia Type- general with ASA Monitors.         Additional Monitors:     Airway Plan:     Comment: NPOa fter MN (sip of water with meds)    Accucheck = 115.       Plan Factors-Exercise tolerance (METS): >4 METS.    Chart reviewed.    Patient summary reviewed.    Patient is a current smoker.  Patient instructed to abstain from smoking on day of procedure. Patient smoked on day of surgery.            Induction- intravenous.    Postoperative Plan- Plan for postoperative opioid use. Planned trial extubation        Informed Consent- Anesthetic plan and risks discussed with patient.  I personally reviewed this patient with the CRNA. Discussed and agreed on the Anesthesia Plan with the CRNA..

## 2024-07-16 NOTE — OP NOTE
REPAIR HERNIA UMBILICAL  Postoperative Note  PATIENT NAME: Bryce Silver  : 1955  MRN: [id number]  [facility] OR ROOM 02    Surgery Date: 2024    Umbilical hernia without obstruction and without gangrene [K42.9]    Post-Op Diagnosis Codes:     * Umbilical hernia without obstruction and without gangrene [K42.9]    Procedure(s):  REPAIR HERNIA UMBILICAL    Surgeons and Role:     * Quang Lorenzo MD - Primary     * Tammy Rondon PA-C - Assisting    The Physician Assistant was medically necessary for surgical safety the case including suturing, retraction, and hemostasis.    Specimens:  * No specimens in log *    Estimated Blood Loss:   Minimal    Anesthesia Type:   General     Procedure:  The patient was seen in the Holding Room. The risks, benefits, complications, treatment options, and expected outcomes were discussed with the patient. The possibilities of reaction to medication, pulmonary aspiration, perforation of viscus, bleeding, recurrent infection, the need for additional procedures, failure to diagnose a condition, and creating a complication requiring transfusion or operation were discussed with the patient. The patient concurred with the proposed plan, giving informed consent.  The site of surgery properly noted/marked. The patient was taken to Operating Room. A Time Out was held and the above information confirmed.    The patient was prepped and draped in a sterile fashion.  An additional timeout was performed.  An infraumbilical incision was made, dissection carried out to the hernia sac.  Hernia sac was freed of its surrounding adhesions.  The hernia sac was opened and its contents reduced. Excess hernia sac was excised.  The umbilical hernia was closed interrupted figure of eight 2-0 Prolenes.    The subcutaneous tissues were closed using 3-0 Vicryl sutures and the skin closed using a 4-0 Monocryl subcuticular stitch. The wound was dressed, placing positive pressure in the umbilicus with  a gauze pack.  The wound was dressed with Histacryl.  The patient tolerated the procedure well.    Instrument, sponge, and needle counts were correct prior to closure and at the conclusion of the case.     This text is generated with voice recognition software. There may be translation, syntax,  or grammatical errors. If you have any questions, please contact the dictating provider.     Complications: None    Condition: Stable to PACU    SIGNATURE: Quang Lorenzo MD   DATE: July 16, 2024   TIME: 11:14 AM

## 2024-07-16 NOTE — DISCHARGE INSTR - AVS FIRST PAGE
[facility] General Surgery [place]  Post-Operative Care Instructions  Dr. Quang Lorenzo MD, [facility]  [phone]    1. General: You will feel pulling sensations around the wound or funny aches and pains around the incisions. This is normal. Even minor surgery is a change in your body and this is your body's way of reacting to it. If you have had abdominal surgery, it may help to support the incision with a small pillow or blanket for comfort when moving or coughing.    2. Wound care:  Okay to shower.  The glue will fall off over the next week or 2.   Use ice for the first 5 days after surgery.  Do not use for longer than 20 minutes at a time. Use ice 5 times per day.    Abdominal binder.  May remove for hygiene or while in bed.      3. Water: You may shower over the wounds. Do not bathe or use a pool or hot tub until cleared by the physician.   If you were discharged with a drain, make sure drain site is covered with plastic wrap before showering.    4. Activity: You may go up and down stairs, walk as much as you are comfortable, but walk at least 3 times each day. If you have had abdominal surgery, do not lift anything heavier than 15 lbs for the 1st 2 weeks and 25 lbs for weeks 3 and 4.     5. Diet: You may resume a regular diet. If you had a same-day surgery or overnight stay surgery, you may wish to eat lightly for a few days: soups, crackers, and sandwiches. You may resume a regular diet when ready.    6. Medications: Resume all of your previous medications, unless told otherwise by the doctor. Avoid aspirin products for 2-3 days after the date of surgery. You may, at that time, begin to take them again. Use Tylenol and Ibuprofen for pain control.  You may alternate these medications every 3 hours.  For example: you may take Tylenol at noon, Ibuprofen at 3:00 p.m., and Tylenol again at 6:00 p.m., etc.  You should use ice to assist with pain control as above.  You do not need to take narcotic pain medication  unless you are having significant pain.       7. Driving: You will need someone to drive you home on the day of surgery or discharge. Do not drive or make any important decisions while on narcotic pain medication or 24 hours and after anesthesia or sedation for surgery. Generally, you may drive when your off all narcotic pain medications and you are comfortable.     8. Upset Stomach: You may take Maalox, Tums, or similar items for an upset stomach. If your narcotic pain medication causes an upset stomach, do not take it on an empty stomach. Try taking it with at least some crackers or toast.        9. Constipation: Patients often experience constipation after surgery. You may take over-the-counter medication for this, such as Metamucil, Senokot, Dulcolax, milk of magnesia, etc. You may take a suppository unless you have had anorectal surgery such as a procedure on your hemorrhoids. If you experience significant nausea or vomiting after abdominal surgery, call the office before trying any of these medications.    10. Call the office: If you are experiencing any of the following: fevers above 101.5°, significant nausea or vomiting, if the wound develops drainage and/or there is excessive redness around the wound, or if you have significant diarrhea or other worsening symptoms.    11. Pain: You may be given a prescription for pain medication.  This will be sent to your pharmacy prior to discharge.    12. Sexual Activity: You may resume sexual activity when you feel ready and comfortable and your incision is sealed and healed without apparent infection risk.    13. Urination: If you have not urinated in 6 hours, go directly to the ER for evaluation for urinary retention.     14. Follow-up in 2 weeks.

## 2024-07-16 NOTE — ANESTHESIA POSTPROCEDURE EVALUATION
Post-Op Assessment Note    CV Status:  Stable  Pain Score: 0    Pain management: adequate       Mental Status:  Sleepy   Hydration Status:  Euvolemic   PONV Controlled:  Controlled   Airway Patency:  Patent     Post Op Vitals Reviewed: Yes    No anethesia notable event occurred.    Staff: Anesthesiologist, CRNA               BP   138/76   Temp   97.5   Pulse  79   Resp   18   SpO2   98

## 2024-07-16 NOTE — INTERVAL H&P NOTE
H&P reviewed. After examining the patient I find no changes in the patients condition since the H&P had been written.    Vitals:    07/16/24 0940   BP: 117/65   Pulse: 76   Resp: 16   Temp: 98.1 °F (36.7 °C)   SpO2: 94%

## 2024-07-16 NOTE — INTERIM OP NOTE
REPAIR HERNIA UMBILICAL  Postoperative Note  PATIENT NAME: Bryce Silver  : 1955  MRN: [id number]  [facility] OR ROOM 02    Surgery Date: 2024    Preop Diagnosis:  Umbilical hernia without obstruction and without gangrene [K42.9]    Post-Op Diagnosis Codes:     * Umbilical hernia without obstruction and without gangrene [K42.9]    Procedure(s) (LRB):  REPAIR HERNIA UMBILICAL (N/A)    Surgeons and Role:     * Quang Lorenzo MD - Primary     * Tammy Rondon PA-C - Assisting    Specimens:  * No specimens in log *    Estimated Blood Loss:   Minimal    Anesthesia Type:   General ETA    Findings:   As above    Complications:   None    Hernia Surgery Operative Note    Name: Bryce Silver    Gender: male    Age: 69 y.o.    Race:     BMI: Body mass index is 19.86 kg/m².    DIAGNOSIS: No diagnosis found.    Diabetes Mellitis: No    Coronary Heart Disease: No    Cancer: No    Steroid Use: No    Tobacco use: Yes   Last used: today   Type: cigarettes   Frequency (per day): 1 ppd   Duration (years): 45    Alcohol use: No    Location of Hernia: umbilical  Length:2 cm  Width:1 cm  Primary: Yes  Recurrent: No   Number of recurrences:    Access: Open    Component Separation: No    Mesh:   No    Operative Time: 19 min             SIGNATURE: Tammy Rondon PA-C   DATE: 2024   TIME: 11:14 AM

## 2024-07-29 ENCOUNTER — OFFICE VISIT (OUTPATIENT)
Dept: SURGERY | Facility: HOSPITAL | Age: 69
End: 2024-07-29

## 2024-07-29 VITALS
WEIGHT: 167.6 LBS | DIASTOLIC BLOOD PRESSURE: 70 MMHG | SYSTOLIC BLOOD PRESSURE: 113 MMHG | BODY MASS INDEX: 20.41 KG/M2 | TEMPERATURE: 97.3 F | HEART RATE: 78 BPM | HEIGHT: 76 IN

## 2024-07-29 DIAGNOSIS — Z09 POSTOP CHECK: Primary | ICD-10-CM

## 2024-07-29 PROCEDURE — 99024 POSTOP FOLLOW-UP VISIT: CPT | Performed by: SURGERY

## 2024-07-29 NOTE — PROGRESS NOTES
Seen and examined no acute events, no issues  Avss afebrile  Soft +BS ND NT incision cdi    S/p UHR  Cont light duty for two weeks, f/u prn

## 2024-08-02 ENCOUNTER — TELEPHONE (OUTPATIENT)
Age: 69
End: 2024-08-02

## 2024-08-02 NOTE — TELEPHONE ENCOUNTER
Can she feel it where it was marked?    [facility] and [facility] is requesting a D/C order for patients nicotine lozenge be faxed over, it has not been given in 6 months +, patient is also still smoking.

## 2024-09-23 ENCOUNTER — TELEPHONE (OUTPATIENT)
Age: 69
End: 2024-09-23

## 2024-09-23 ENCOUNTER — OFFICE VISIT (OUTPATIENT)
Age: 69
End: 2024-09-23
Payer: MEDICARE

## 2024-09-23 VITALS
SYSTOLIC BLOOD PRESSURE: 102 MMHG | HEART RATE: 61 BPM | OXYGEN SATURATION: 98 % | WEIGHT: 167.6 LBS | HEIGHT: 72 IN | BODY MASS INDEX: 22.7 KG/M2 | TEMPERATURE: 98 F | DIASTOLIC BLOOD PRESSURE: 60 MMHG

## 2024-09-23 DIAGNOSIS — N40.1 BENIGN PROSTATIC HYPERPLASIA WITH NOCTURIA: ICD-10-CM

## 2024-09-23 DIAGNOSIS — M17.11 PRIMARY OSTEOARTHRITIS OF RIGHT KNEE: ICD-10-CM

## 2024-09-23 DIAGNOSIS — R42 DIZZINESS: ICD-10-CM

## 2024-09-23 DIAGNOSIS — J42 CHRONIC BRONCHITIS, UNSPECIFIED CHRONIC BRONCHITIS TYPE (HCC): ICD-10-CM

## 2024-09-23 DIAGNOSIS — K59.00 CONSTIPATION, UNSPECIFIED CONSTIPATION TYPE: ICD-10-CM

## 2024-09-23 DIAGNOSIS — F33.0 MILD EPISODE OF RECURRENT MAJOR DEPRESSIVE DISORDER (HCC): ICD-10-CM

## 2024-09-23 DIAGNOSIS — R35.1 BENIGN PROSTATIC HYPERPLASIA WITH NOCTURIA: ICD-10-CM

## 2024-09-23 DIAGNOSIS — R41.89 COGNITIVE DEFICITS: Primary | ICD-10-CM

## 2024-09-23 DIAGNOSIS — E44.1 MILD PROTEIN-CALORIE MALNUTRITION (HCC): Primary | ICD-10-CM

## 2024-09-23 DIAGNOSIS — E78.2 MIXED HYPERLIPIDEMIA: ICD-10-CM

## 2024-09-23 DIAGNOSIS — E11.9 TYPE 2 DIABETES MELLITUS WITHOUT COMPLICATION, WITHOUT LONG-TERM CURRENT USE OF INSULIN (HCC): ICD-10-CM

## 2024-09-23 DIAGNOSIS — Z71.89 ADVANCED CARE PLANNING/COUNSELING DISCUSSION: ICD-10-CM

## 2024-09-23 DIAGNOSIS — F20.9 SCHIZOPHRENIA, UNSPECIFIED TYPE (HCC): ICD-10-CM

## 2024-09-23 DIAGNOSIS — M19.90 ARTHRITIS: ICD-10-CM

## 2024-09-23 DIAGNOSIS — R26.2 AMBULATORY DYSFUNCTION: ICD-10-CM

## 2024-09-23 PROCEDURE — 99205 OFFICE O/P NEW HI 60 MIN: CPT | Performed by: INTERNAL MEDICINE

## 2024-09-23 RX ORDER — ASPIRIN 81 MG/1
81 TABLET, CHEWABLE ORAL DAILY
COMMUNITY

## 2024-09-23 RX ORDER — POLYETHYLENE GLYCOL 3350 17 G/17G
17 POWDER, FOR SOLUTION ORAL DAILY
Qty: 14 EACH | Refills: 3 | Status: SHIPPED | OUTPATIENT
Start: 2024-09-23 | End: 2024-11-22

## 2024-09-23 RX ORDER — TAMSULOSIN HYDROCHLORIDE 0.4 MG/1
0.4 CAPSULE ORAL
Qty: 90 CAPSULE | Refills: 3 | Status: SHIPPED | OUTPATIENT
Start: 2024-09-23

## 2024-09-23 RX ORDER — MECLIZINE HCL 12.5 MG 12.5 MG/1
12.5 TABLET ORAL 3 TIMES DAILY PRN
Qty: 30 TABLET | Refills: 0 | Status: SHIPPED | OUTPATIENT
Start: 2024-09-23

## 2024-09-23 RX ORDER — MELOXICAM 15 MG/1
15 TABLET ORAL DAILY
Qty: 30 TABLET | Refills: 3 | Status: SHIPPED | OUTPATIENT
Start: 2024-09-23

## 2024-09-23 NOTE — TELEPHONE ENCOUNTER
Moni called from [facility]    Patient went swimming and missed his appt with Dr. Smith for today at 10 for Assessement

## 2024-09-23 NOTE — PROGRESS NOTES
[facility]  Consultation  [address], [address]  [place] [address] [phone]      NAME: Bryce Silver  AGE: 69 y.o. SEX: male    DATE OF ENCOUNTER: 9/23/2024    Assessment and Plan     1. Cognitive deficits  - MoCA score 10/30 with deficits in visual-spatial/executive, draw clock, abstraction, delayed recall and orientation.  Likely cognitive impairment, however this may be affected by underlying psychiatric issues of schizophrenia and depression.  - Pt reports of forgetfulness and memory issues.  - Will refer to Neuropsychiatry for further cognitive and capacity evaluation giving compounding psych factors.   -Patient is  independent with ADLs and needs assistance with IADLs.  He resides at [facility] (group home).  -Patient advised to remain active physically, mentally and socially  -Maintain chronic conditions under control continue following with PCP  -Driving safety concerns: Patient does not drive, continue with driving cessation  -Decision-making: likely limited for complex medical or financial decisions.  Neuropsych referral for capacity evaluation.   -ACP review patient denies having a living will or POA.  -Care conference and 4-6 weeks to discuss diagnosis, management and prognosis    2. Ambulatory dysfunction  - History of falls, last one reportedly few weeks ago.  - Unclear if medication related vs. Neurological issue.  - TUGs 9 seconds. Does not use any assistive device.  - Will refer to PT to increase muscle strength     3. Mixed hyperlipidemia  - Takes pravastatin, tolerated  -Managed by PCP    4. Mild episode of recurrent major depressive disorder (HCC)  - Follows w/ psychiatry and lives at group home for residents w/ behavioral health issues.  - Takes bupropion.    5. Schizophrenia, unspecified type (HCC)  - Follows w/ psychiatry.  - Takes paliperidone daily and benztropine PRN. No EPS reported.    6. Type 2 diabetes mellitus without complication, without  long-term current use of insulin (HCC)  -Currently on metformin  -Follow-up with PCP    7. Arthritis  - Takes meloxicam  -Tylenol 500 mg p.o. every 8 hours as as needed  -Voltaren gel    8. Chronic bronchitis, unspecified chronic bronchitis type (HCC)  - Takes albuterol  - Currently smokes 1 PPD  - Educated and encouraged smoking cessation.  9. Benign prostatic hyperplasia with nocturia  - Takes tamsulosin. Denies urinary incontinence.    10. Advanced care planning/counseling discussion  - Has 5 sisters.  - Pt denies having a POA nor a Living Will.    Orders Placed This Encounter   Procedures    Ambulatory Referral to Physical Therapy     - Discussed referrals to [facility] for further cognitive and capacity evaluation.  Discussed referral to PT as well. No changes to medications were made.    Chief Complaint     Chief Complaint   Patient presents with    Geriatric Evaluation     New Patient      Patient is here for a geriatric assessment with memory issues as a concern     History of Present Illness       We had the pleasure of evaluating Bryce Silver who is a 69 y.o. male  in Geriatric consultation today. Memory issues were first noticed by patient. It is unclear when these issues first arose due to his underlying psychiatric disorders.      Bryce Silver reports he is doing okay. He lives at [facility], which is a group home for residents with behavioral health or substance abuse issues. He has a history of illicit drug use in the past, but has not engaged in this activity for at least 10 years. He is a . He does not  drive, does not get lost in familiar settings, and has not had recent citations or accidents. However, he did report of a few falls last week where he injured his knee and back. does not manage his own affairs such as balancing checkbook, paying bills, and managing investments - though this may be affected by his residence at the [facility].  does not notice changes in his own  mood or personality and has been treated in the past for depression.  does  note hearing and vision issues. States that he has glasses, but is unsure whether he is supposed to use them daily or not. Also reports of hearing loss and would be amenable to a hearing aid if he is given one. does not report any sleep issues. He does not have a living will and does not have an appointed POA.     Group home  ([facility]) accompanied the patient today include. She reports the patients behaviors include the following: forgetfulness, overall is pleasant and funny but can get irritated easily.     The following portions of the patient's history were reviewed and updated as appropriate: allergies, current medications, past family history, past medical history, past social history, past surgical history and problem list.    FUNCTIONAL STATUS:  ADLs  Does patient require assistance with:  Bathing: No  Dressing: No  Transferring: No  Continence: No  Toileting: No  Feeding: No     IADLs  Dose patient require assistance with:  Telephone: No  Shopping: No  Food Preparation: No  Housekeeping: No  Laundry: Yes  Transportation: Does not drive.  Medications: Lives at facility, which dispenses medications.  Finances: No     NEUROPSYCH SYMPTOMS:  Does patient get angry or hostile? No    Resist care from others? No  Does patient see or hear things that no one else can see or hear? No   Does patient act impatient and cranky? No  Does mood frequently change for no apparent reason? No  Does patient act suspicious without good reason (example: believes that others are stealing from him or her, or planning to harm him or her in some way)? No  Does patient less interested in his or her usual activities or in the activities and plans of others? No  Does patient have trouble sleeping at night? No  Dose patient have abnormal movements while asleep? No     SAFETY:  Hearing and vision issue: Yes, wears glasses  Any gait or balance  "disorder: No  Patient does not use any cane or walker  Any falls in the last year: No  Any history of wandering: No  Are there firearms or guns in the home: N/A  Does patient drive: No  Any driving accidents or citations in the last year: N/A  Any concerns about patient's ability to drive: N/A     ACP REVIEW:  Does patient have POA: No  Does patient have a Living will: No  Any legal assistance needed for healthcare planning?: Yes    Review of Systems     Constitutional: Negative for activity change.   HENT: Negative for hearing loss, trouble swallowing.    Eyes: Negative for visual disturbance.   Respiratory: Negative for choking and shortness of breath.    Gastrointestinal: Negative for nausea.   Genitourinary: Negative for dysuria and frequency.   Musculoskeletal: Negative for back pain and neck pain.   Neurological: Negative for facial asymmetry, speech difficulty, weakness and light-headedness.   Psychiatric/Behavioral: Negative for confusion, appears disheveled and scattered in thinking.     Objective     /60 (BP Location: Left arm, Patient Position: Sitting, Cuff Size: Standard)   Pulse 61   Temp 98 °F (36.7 °C) (Temporal)   Ht 6' 0.05\" (1.83 m)   Wt 76 kg (167 lb 9.6 oz)   SpO2 98%   BMI 22.70 kg/m²     Physical Exam  Vitals and nursing note reviewed.   Constitutional:       Appearance: Normal appearance  HENT:      Head: Normocephalic and atraumatic.   Ears:     External ear normal.   Eyes:      Extraocular Movements: Extraocular movements intact.      Conjunctiva/sclera: Conjunctivae normal.      Pupils: Pupils are equal, round, and reactive to light.   Mouth/Throat:     Oropharynx is clear and moist. No oropharyngeal exudate.   Neck:    Normal range of motion. Neck supple. No JVD present. No tracheal deviation present. No thyromegaly present.   Cardiovascular:      Rate and Rhythm: Normal rate.      Pulses: Normal pulses.      Heart sounds: Normal heart sounds. No murmur heard.  Pulmonary:      " Effort: Pulmonary effort is normal. No respiratory distress.      Breath sounds: Normal breath sounds. Breath sounds: No wheezing or rales.  Abdominal:      General: Abdomen is flat.      Palpations: Abdomen is soft.   Musculoskeletal:      Cervical back: Normal range of motion and neck supple.   Skin:     General: Skin is warm and dry.   Neurological:      Mental Status: alert and oriented to person, place, and time.      Cranial Nerves: No cranial nerve deficit, dysarthria or facial asymmetry.      Sensory: Sensation is intact.      Motor: No weakness, atrophy or pronator drift.      Coordination: Romberg sign negative. Coordination normal.      Gait: Gait is intact.      MoCA 10/30    TUG s 9 seconds  Psychiatric:     Mood and Affect: Erratic and scattered.    Behavior: Behavior normal.     GDS 0    Pertinent Laboratory/Diagnostic Studies:  I have personally reviewed blood work from 7/16/2024 including BMP: , K4.1, BUN 11 and creatinine 0.83, GFR 90  CBC: WBC 6.4, Hb 12.6, HCT 37.9 and platelets 282

## 2024-09-23 NOTE — TELEPHONE ENCOUNTER
Left message with Blayne Wade [phone] Ext [phone] regarding missed New Patient appointment for 9/23/24 @ 10:00.  Left message to call back to reschedule.

## 2024-09-25 ENCOUNTER — TELEPHONE (OUTPATIENT)
Age: 69
End: 2024-09-25

## 2024-09-25 DIAGNOSIS — E55.9 VITAMIN D DEFICIENCY: Primary | ICD-10-CM

## 2024-09-25 NOTE — TELEPHONE ENCOUNTER
Danica from [facility] called to get clarification on the Vit D pt is supposed to be taking. Please call her back about that.     She also states she sent a dc order for the [employer] elbow support. He does not wear that.    Please call Danica back at [phone]

## 2024-09-27 ENCOUNTER — OFFICE VISIT (OUTPATIENT)
Dept: FAMILY MEDICINE CLINIC | Facility: HOSPITAL | Age: 69
End: 2024-09-27
Payer: MEDICARE

## 2024-09-27 VITALS
RESPIRATION RATE: 16 BRPM | TEMPERATURE: 97.7 F | HEART RATE: 68 BPM | SYSTOLIC BLOOD PRESSURE: 110 MMHG | HEIGHT: 72 IN | WEIGHT: 166 LBS | BODY MASS INDEX: 22.48 KG/M2 | DIASTOLIC BLOOD PRESSURE: 78 MMHG | OXYGEN SATURATION: 99 %

## 2024-09-27 DIAGNOSIS — S81.002A OPEN KNEE WOUND, LEFT, INITIAL ENCOUNTER: Primary | ICD-10-CM

## 2024-09-27 PROCEDURE — G2211 COMPLEX E/M VISIT ADD ON: HCPCS | Performed by: INTERNAL MEDICINE

## 2024-09-27 PROCEDURE — 99213 OFFICE O/P EST LOW 20 MIN: CPT | Performed by: INTERNAL MEDICINE

## 2024-09-27 RX ORDER — MUPIROCIN 20 MG/G
OINTMENT TOPICAL 2 TIMES DAILY
Qty: 1 G | Refills: 1 | Status: SHIPPED | OUTPATIENT
Start: 2024-09-27 | End: 2024-10-04

## 2024-09-27 NOTE — ASSESSMENT & PLAN NOTE
Patient presents with chief complaint of a wound of the left knee at that occurred when he bumped his knee into something.  He does not recall the details of the event and he does not recall the timing of the event.  He has mild pain at the lesions that sometimes drain.    I found no evidence of abscess or cellulitis.  I could not express blood or pus from the wounds.  He had no left knee effusion.  There was no evidence of infectious left knee arthritis    I advised patient to cover the wounds and apply Bactroban twice a day for a week.  I asked him to call if anything changes.        Orders:    mupirocin (BACTROBAN) 2 % ointment; Apply topically 2 (two) times a day for 7 days

## 2024-09-27 NOTE — PROGRESS NOTES
"Ambulatory Visit  Name: Bryce Silver      : 1955      MRN: [id number]  Encounter Provider: Jessi Cloud MD  Encounter Date: 2024   Encounter department: [facility] PRIMARY CARE [address]    Assessment & Plan  Open knee wound, left, initial encounter  Patient presents with chief complaint of a wound of the left knee at that occurred when he bumped his knee into something.  He does not recall the details of the event and he does not recall the timing of the event.  He has mild pain at the lesions that sometimes drain.    I found no evidence of abscess or cellulitis.  I could not express blood or pus from the wounds.  He had no left knee effusion.  There was no evidence of infectious left knee arthritis    I advised patient to cover the wounds and apply Bactroban twice a day for a week.  I asked him to call if anything changes.        Orders:    mupirocin (BACTROBAN) 2 % ointment; Apply topically 2 (two) times a day for 7 days       History of Present Illness     HPI      Review of Systems        Objective     /78   Pulse 68   Temp 97.7 °F (36.5 °C) (Tympanic)   Resp 16   Ht 6' 0.05\" (1.83 m)   Wt 75.3 kg (166 lb)   SpO2 99%   BMI 22.48 kg/m²     Physical Exam  Constitutional:       General: He is not in acute distress.     Appearance: He is not toxic-appearing.   HENT:      Head: Normocephalic.   Musculoskeletal:         General: No swelling or tenderness.   Skin:     Findings: Erythema present.   Neurological:      Mental Status: He is alert.         "

## 2024-10-18 ENCOUNTER — OFFICE VISIT (OUTPATIENT)
Dept: FAMILY MEDICINE CLINIC | Facility: HOSPITAL | Age: 69
End: 2024-10-18
Payer: MEDICARE

## 2024-10-18 VITALS
HEIGHT: 72 IN | DIASTOLIC BLOOD PRESSURE: 70 MMHG | HEART RATE: 69 BPM | OXYGEN SATURATION: 95 % | TEMPERATURE: 97.6 F | SYSTOLIC BLOOD PRESSURE: 104 MMHG | BODY MASS INDEX: 23 KG/M2 | WEIGHT: 169.8 LBS

## 2024-10-18 DIAGNOSIS — E11.9 TYPE 2 DIABETES MELLITUS WITHOUT COMPLICATION, WITHOUT LONG-TERM CURRENT USE OF INSULIN (HCC): Primary | ICD-10-CM

## 2024-10-18 DIAGNOSIS — J42 CHRONIC BRONCHITIS, UNSPECIFIED CHRONIC BRONCHITIS TYPE (HCC): ICD-10-CM

## 2024-10-18 PROBLEM — K14.0 TONGUE ULCER: Status: RESOLVED | Noted: 2023-03-02 | Resolved: 2024-10-18

## 2024-10-18 PROCEDURE — G2211 COMPLEX E/M VISIT ADD ON: HCPCS | Performed by: INTERNAL MEDICINE

## 2024-10-18 PROCEDURE — 99213 OFFICE O/P EST LOW 20 MIN: CPT | Performed by: INTERNAL MEDICINE

## 2024-10-18 RX ORDER — HALOPERIDOL 5 MG/1
5 TABLET ORAL DAILY
COMMUNITY
Start: 2024-10-03

## 2024-10-18 RX ORDER — PALIPERIDONE 9 MG/1
9 TABLET, EXTENDED RELEASE ORAL EVERY MORNING
COMMUNITY
Start: 2024-10-03

## 2024-10-18 NOTE — ASSESSMENT & PLAN NOTE
Lab Results   Component Value Date    HGBA1C 6.3 07/12/2024       Patient has type 2 diabetes mellitus.  His blood sugars are well-controlled in July.  He saw ophthalmology this year.  He denies abdominal pain, diarrhea on metformin.  Continue metformin, I will check his A1c, urine microalbumin in 4 months    Orders:    Albumin / creatinine urine ratio; Future    Comprehensive metabolic panel; Future    Hemoglobin A1C; Future    CBC and Platelet; Future    TSH, 3rd generation with Free T4 reflex; Future

## 2024-10-18 NOTE — ASSESSMENT & PLAN NOTE
Patient presents for follow-up.  He has COPD.  He denies shortness of breath, wheezing.  He still smokes.  Precontemplative about smoking cessation.  His lungs are clear to auscultation without wheezing today.  Continue albuterol as needed

## 2024-10-18 NOTE — PROGRESS NOTES
"Ambulatory Visit  Name: Bryce Silver      : 1955      MRN: [id number]  Encounter Provider: Jessi Cloud MD  Encounter Date: 10/18/2024   Encounter department: [facility] CARE [address]    Assessment & Plan  Type 2 diabetes mellitus without complication, without long-term current use of insulin (HCC)    Lab Results   Component Value Date    HGBA1C 6.3 2024       Patient has type 2 diabetes mellitus.  His blood sugars are well-controlled in July.  He saw ophthalmology this year.  He denies abdominal pain, diarrhea on metformin.  Continue metformin, I will check his A1c, urine microalbumin in 4 months    Orders:    Albumin / creatinine urine ratio; Future    Comprehensive metabolic panel; Future    Hemoglobin A1C; Future    CBC and Platelet; Future    TSH, 3rd generation with Free T4 reflex; Future    Chronic bronchitis, unspecified chronic bronchitis type (HCC)  Patient presents for follow-up.  He has COPD.  He denies shortness of breath, wheezing.  He still smokes.  Precontemplative about smoking cessation.  His lungs are clear to auscultation without wheezing today.  Continue albuterol as needed            History of Present Illness     HPI      Review of Systems        Objective     /70   Pulse 69   Temp 97.6 °F (36.4 °C)   Ht 6' 0.05\" (1.83 m)   Wt 77 kg (169 lb 12.8 oz)   SpO2 95%   BMI 23.00 kg/m²     Physical Exam  Constitutional:       General: He is not in acute distress.     Appearance: He is not toxic-appearing.   Cardiovascular:      Rate and Rhythm: Normal rate and regular rhythm.   Pulmonary:      Effort: No respiratory distress.      Breath sounds: No wheezing or rales.   Abdominal:      General: Bowel sounds are normal. There is no distension.      Palpations: Abdomen is soft.      Tenderness: There is no abdominal tenderness.   Neurological:      Mental Status: He is alert.         "

## 2024-10-18 NOTE — PROGRESS NOTES
[facility]  [address], [address]  [place] [address]  [phone]    Care Conference    NAME: Bryce Silver  AGE: 69 y.o. SEX: male  YOB: 1955  DATE OF ASSESSMENT: 09/23/24  DATE OF CONFERENCE: 10/28/24    Family Present: None  Staff Present: Regan Perez MD, JOSE ANTONIO Martinez    Patient / Family Goals of Care: Review cognitive decline and associated symptoms, discuss treatment options and care planning.     Medical Concerns (Current/Historical): Mixed hyperlipidemia, mild episode of recurrent major depressive disorder, schizophrenia, unspecified type, type 2 diabetes mellitus without complication, without long-term current use of insulin, arthritis, chronic bronchitis, unspecified chronic bronchitis type, benign prostatic hyperplasia with nocturia    Geriatric Syndromes/Age Related Syndromes: Ambulatory dysfunction, cognitive impairment, advanced care planning/counseling discussion    Neuropsychological  Cognitive impairment  Multifactorial including schizophrenia, depression and history of drug abuse in the past.  Patient was referred to neuropsychology last visit for further cognitive and capacity evaluation, however patient did not schedule a visit with them.  Our  will contact his facility to schedule an appointment with neuropsych  [place] Cognitive Assessment: 10/30  Geriatric Depression Screen: 0/15  Driving safety concerns: Patient does not drive, continue with driving cessation  Decision-making: likely limited for complex medical or financial decisions.  Referred to neuropsych at last visit for capacity evaluation.   ACP review: patient does not have a living will or POA.    Remain active physically, mentally and socially  Pharmaceutical and non-pharmaceutical interventions discussed  Engage in cognitively challenging exercises such as crosswords and puzzles  Maintain chronic conditions under control  Repeat cognitive assessment in  6 months    Diagnostic Studies  I have personally reviewed blood work from 7/16/2024 including BMP: , K4.1, BUN 11 and creatinine 0.83, GFR 90  CBC: WBC 6.4, Hb 12.6, HCT 37.9 and platelets 282    Physical Finding Impacting Function   Timed Up and Go Test: 9 seconds   Fall Risk: Moderate   Activities of Daily Living: Independent   Instrumental Activities of Daily Living: assist    Encourage appropriate footwear at all times  Review fall risk prevention tips and adjust within the home environment as needed    Medications Reviewed   Medications seem appropriate for present conditions  Check with PCP before using over the counter medications  Avoid over the counter medications that can affect cognition (e.g., Benadryl, Tylenol PM)   Avoid NSAIDs due to risk of GI bleed and renal impairment    Other Findings   Overall health  BMI: 23.00 kg/m2  Maintain well-balanced diet  Continue following with primary care physician regularly  Ambulatory dysfunction  History of falls, last one reportedly few weeks ago.  - Unclear if medication related vs. Neurological issue.  - TUGs 9 seconds. Does not use any assistive device.  - Will refer to PT to increase muscle strength   Mixed hyperlipidemia  Takes pravastatin, tolerated  Managed by PCP   Mild episode of recurrent major depressive disorder  Follows w/ psychiatry and lives at group home for residents w/ behavioral health issues.  Takes bupropion.  Schizophrenia, unspecified type  Follows w/ psychiatry.  Takes paliperidone daily and benztropine PRN. No EPS reported.  Type 2 diabetes mellitus without complication, without long-term current use of insulin  Currently on metformin  Follow-up with PCP  Arthritis  Takes meloxicam  Tylenol 500 mg p.o. every 8 hours as as needed  Voltaren gel  Chronic bronchitis, unspecified chronic bronchitis type  Takes albuterol  Currently smokes 1 PPD  Educated and encouraged smoking cessation.  Advanced care planning/counseling discussion  Has  5 sisters.  Pt denies having a POA nor a Living Will  Benign prostatic hyperplasia with nocturia  Takes tamsulosin. Denies urinary incontinence.     Recommended Health Maintenance   Immunizations, if not contraindicated:   Influenza vaccine yearly  Pneumo vaccine every 5 years (65 years and over)  Shingles vaccine  COVID-19 vaccine    Social / Safety Concerns  Consider a [facility] for positive socialization, physical exercise, cognitive stimulation  Stay in touch with family and friends  Plan self-care activities for your mental well-being each week  Recommend review of fall risk prevention tips  Recommend use of fall precautions including fall alert device  Consider assistance for medication administration such as blister packaging or use of an automated pill dispenser    Long Term Care Issues  Maintain updated advance directives and provide a copy to your primary care provider  Utilize reorientation and redirection as needed (dependent on situation)  Educational information provided    Patient and family verbalized understanding of above care plan.    For care coordination purposes, this care plan will be shared with your primary care provider. With any questions, please contact our office at [phone].       Chief Complaint     Patient is here today for a care conference to discuss results and recommendations for memory loss.     History of Present Illness     Bryce Silver is a 69 y.o. y.o. male presents for family conference to review memory loss and associated symptoms, discuss treatment options and care planning. Initial comprehensive geriatric assessment completed on 9/23/2024, please refer to initial consultation for full details and history. MoCA 10/30 at that time.     Patient reports feeling well and denies any medication changes since last visit. Patient denies any recent falls, hospitalization or any other acute complaints.    The following portions of the patient's history were  "reviewed and updated as appropriate: allergies, current medications, past family history, past medical history, past social history, past surgical history and problem list.       Review of Systems     Constitutional: Negative for activity change.   HENT: Negative for hearing loss, trouble swallowing.    Eyes: Negative for visual disturbance.   Respiratory: Negative for choking and shortness of breath.    Gastrointestinal: Negative for nausea.   Genitourinary: Negative for dysuria and frequency.   Musculoskeletal: Negative for back pain and neck pain.   Neurological: Negative for dizziness, facial asymmetry, speech difficulty, weakness and light-headedness.   Psychiatric/Behavioral: Negative for behavioral problems and confusion.      Objective     /72 (BP Location: Left arm, Patient Position: Sitting, Cuff Size: Adult)   Pulse 59   Temp (!) 96.8 °F (36 °C) (Temporal)   Ht 6' 0.6\" (1.844 m)   Wt 77 kg (169 lb 12.8 oz)   SpO2 92%   BMI 22.65 kg/m²     Physical Exam  Vitals and nursing note reviewed.   Constitutional:   General: Patient is not in acute distress.  Appearance: Normal appearance. Patient is well-developed. Patient is not diaphoretic.   HENT:   Head: Normocephalic.   Ears: External ear normal.   Nose: Nose normal.   Mouth/Throat: Oropharynx is clear and moist. No oropharyngeal exudate.   Eyes: Pupils are equal, round, and reactive to light. Conjunctivae and EOM are normal.   Neck: Normal range of motion. Neck supple. No JVD present. No tracheal deviation present. No thyromegaly present.   Cardiovascular:   Rate and Rhythm: Normal rate.   Heart sounds: No murmur heard.  No friction rub. No gallop.   Pulmonary:   Effort: Pulmonary effort is normal. No respiratory distress.   Breath sounds: Normal breath sounds. No wheezing or rales.   Abdominal:   General: Bowel sounds are normal. There is no distension.   Palpations: Abdomen is soft.   Tenderness: There is no abdominal tenderness. "   Musculoskeletal:   General: Normal range of motion, no edema, or tenderness.  Skin:  General: Skin is warm and dry.   Neurological:   General: No focal deficit present.   Mental Status: Patient is alert and oriented to self only. Mental status is at baseline.   Psychiatric:   Mood and Affect: Mood normal.   Behavior: Behavior normal.

## 2024-10-23 ENCOUNTER — EVALUATION (OUTPATIENT)
Facility: CLINIC | Age: 69
End: 2024-10-23
Payer: MEDICARE

## 2024-10-23 DIAGNOSIS — F20.9 SCHIZOPHRENIA, UNSPECIFIED TYPE (HCC): ICD-10-CM

## 2024-10-23 DIAGNOSIS — R26.2 AMBULATORY DYSFUNCTION: Primary | ICD-10-CM

## 2024-10-23 PROCEDURE — 97162 PT EVAL MOD COMPLEX 30 MIN: CPT

## 2024-10-23 NOTE — PROGRESS NOTES
PT Evaluation     Today's date: 10/23/2024  Patient name: Bryce Silver  : 1955  MRN: [id number]  Referring provider: Regan Perez,*  Dx:   Encounter Diagnosis     ICD-10-CM    1. Ambulatory dysfunction  R26.2 Ambulatory Referral to Physical Therapy      2. Schizophrenia, unspecified type (HCC)  F20.9                      Assessment  Impairments: abnormal gait, abnormal movement, activity intolerance, difficulty understanding, impaired balance, impaired physical strength, lacks appropriate home exercise program, safety issue and poor posture   Functional limitations: deconditioning, gait, and transfers    Assessment details: Pt is a 69 y.o. male presenting to physical therapy with chief complaint of imbalance, poor activity tolerance, impaired transfers, occasional falls, and reduced functional participation. Assessment today reflects globally reduced muscle strength, most noteably in the hip, core, and LE musculature, which impacts his ability to perform transfers and ambulation tasks. Furthermore, FGA testing reflects 22/30, reflecting increased risk for falls. Pt struggled to convey subjective information and had difficulty describing his loses of balance  Pt was educated on deficits observed in evaluation and expressed understanding. Pt's ABC reflects reduced balance confidence. Pt will benefit from skilled physical therapy intervention addressing muscle strength, endurance, activity tolerance, and functional deficits so that he/she may be able to navigate their environment with increased independence and safety.   Understanding of Dx/Px/POC: good     Prognosis: good    Goals  STG's: to be achieved within 4 weeks  -Pt will exhibit independence with HEP so that he/she may be able to carry over benefit of skilled PT intervention at home within 2 visits.   -Pt will exhibit improvement in FGA by MCID of 8 to reflect clinically significant improvement from skilled PT intervention.  -Pt will  exhibit improvement in 5xSTS by 4.2s to reflect clinically significant improvement in LE power from skilled PT intervention.   -Pt will improve ABC score by MDC of 14% to reflect improvement in balance confidence.     LTG's: to be achieved within 8 weeks   -Pt will improve ABC score to >80% to reflect reduced risk of falls.   -Pt will exhibit improvement in FGA to >22/30 to reflect reduced risk of falls  -Pt will exhibit improvement in 5xSTS to <12s to reflect improvement in LE power and aid in rising from the toilet.        Plan  Patient would benefit from: skilled physical therapy  Planned modality interventions: electrical stimulation/Russian stimulation, thermotherapy: hydrocollator packs, biofeedback and cryotherapy    Planned therapy interventions: balance, ADL training, abdominal trunk stabilization, activity modification, balance/weight bearing training, behavior modification, body mechanics training, breathing training, canalith repositioning, neuromuscular re-education, patient education, therapeutic activities, therapeutic exercise, strengthening, sensory integrative techniques, gait training, home exercise program, postural training, graded exercise, graded activity and stretching    Frequency: 2x week  Duration in weeks: 12  Plan of Care beginning date: 10/23/2024  Plan of Care expiration date: 12/18/2024  Treatment plan discussed with: patient  Plan details: TE: for strengthening, stretching, and flexibility  TA: for functional participation  NR: for balance, coordination, reaction time  MT: for STM, IASTM, joint mobilizations  Gait Training: to improve gait mechanics.          Subjective Evaluation    History of Present Illness  Mechanism of injury: Pt notes that he had a couple falls in the last year. One occurred when he was walking home from [employer] when his legs got too tired. He notes that he has fallen crossing the street near [place] and was left in the street. He note sometimes he gets  dizzy, then keeps on going, and then gets dizzier. He notes that he falls like 6 times a month. He notes that he feels lightheaded when he is on his medication. When asked to clarify what medication makes him dizzy, pt clarified that it was LSD that made him lightheaded, but says he hasn't taken it in years.  He notes that he sometimes can't get off the floor but most of the time he can. He notes that he just had an operation on his legs and he has holes in them. He notes that he sometimes gets numbness and tingling in his feet.  Patient Goals  Patient goals for therapy: increased strength and improved balance    Pain  No pain reported    Social Support  Steps to enter house: yes  Stairs in house: yes   12  Lives in: community-based residential facility  Lives with: alone    Employment status: working  Hand dominance: right    Treatments  Previous treatment: medication        Objective     Strength/Myotome Testing     Left Hip   Planes of Motion   Flexion: 5    Right Hip   Planes of Motion   Flexion: 5    Left Knee   Extension: 5    Right Knee   Extension: 5    Left Ankle/Foot   Dorsiflexion: 5    Right Ankle/Foot   Dorsiflexion: 5  Neuro Exam:     Sensation   Light touch LE: left WNL and right WNL             Precautions: DM, COPD, Cataracts, knee OA, schizophrenia, smoking, history of drug use, BPH  POC expires: 12/18/2024      Tests and Measures 10/23            ABC 51.25%            FGA 22/30            TUG 12.81s            10mWT             5xSTS 26.84s            Neuro Re-Ed             Foam Square             hurdles             Gait + HT             Foam beam              Tandem gait              Turning drills              Step taps              Ther Ex             Step ups             STS                                                                                           Ther Activity                                       Gait Training                                       Modalities

## 2024-10-28 ENCOUNTER — OFFICE VISIT (OUTPATIENT)
Age: 69
End: 2024-10-28
Payer: MEDICARE

## 2024-10-28 ENCOUNTER — TELEPHONE (OUTPATIENT)
Age: 69
End: 2024-10-28

## 2024-10-28 ENCOUNTER — OFFICE VISIT (OUTPATIENT)
Facility: CLINIC | Age: 69
End: 2024-10-28
Payer: MEDICARE

## 2024-10-28 VITALS
SYSTOLIC BLOOD PRESSURE: 118 MMHG | HEART RATE: 59 BPM | TEMPERATURE: 96.8 F | HEIGHT: 73 IN | WEIGHT: 169.8 LBS | OXYGEN SATURATION: 92 % | BODY MASS INDEX: 22.5 KG/M2 | DIASTOLIC BLOOD PRESSURE: 72 MMHG

## 2024-10-28 DIAGNOSIS — R26.2 AMBULATORY DYSFUNCTION: ICD-10-CM

## 2024-10-28 DIAGNOSIS — R35.1 BENIGN PROSTATIC HYPERPLASIA WITH NOCTURIA: ICD-10-CM

## 2024-10-28 DIAGNOSIS — F33.0 MILD EPISODE OF RECURRENT MAJOR DEPRESSIVE DISORDER (HCC): ICD-10-CM

## 2024-10-28 DIAGNOSIS — Z71.89 ADVANCED CARE PLANNING/COUNSELING DISCUSSION: ICD-10-CM

## 2024-10-28 DIAGNOSIS — R41.89 COGNITIVE DECLINE: Primary | ICD-10-CM

## 2024-10-28 DIAGNOSIS — F20.9 SCHIZOPHRENIA, UNSPECIFIED TYPE (HCC): ICD-10-CM

## 2024-10-28 DIAGNOSIS — R26.2 AMBULATORY DYSFUNCTION: Primary | ICD-10-CM

## 2024-10-28 DIAGNOSIS — M17.11 PRIMARY OSTEOARTHRITIS OF RIGHT KNEE: ICD-10-CM

## 2024-10-28 DIAGNOSIS — E11.9 TYPE 2 DIABETES MELLITUS WITHOUT COMPLICATION, WITHOUT LONG-TERM CURRENT USE OF INSULIN (HCC): ICD-10-CM

## 2024-10-28 DIAGNOSIS — N40.1 BENIGN PROSTATIC HYPERPLASIA WITH NOCTURIA: ICD-10-CM

## 2024-10-28 DIAGNOSIS — E78.2 MIXED HYPERLIPIDEMIA: ICD-10-CM

## 2024-10-28 DIAGNOSIS — J42 CHRONIC BRONCHITIS, UNSPECIFIED CHRONIC BRONCHITIS TYPE (HCC): ICD-10-CM

## 2024-10-28 DIAGNOSIS — F17.200 SMOKER: ICD-10-CM

## 2024-10-28 PROCEDURE — 97110 THERAPEUTIC EXERCISES: CPT

## 2024-10-28 PROCEDURE — 99483 ASSMT & CARE PLN PT COG IMP: CPT | Performed by: INTERNAL MEDICINE

## 2024-10-28 NOTE — PROGRESS NOTES
Daily Note     Today's date: 10/28/2024  Patient name: Bryce Silver  : 1955  MRN: [id number]  Referring provider: Regan Perez,*  Dx:   Encounter Diagnosis     ICD-10-CM    1. Ambulatory dysfunction  R26.2       2. Schizophrenia, unspecified type (HCC)  F20.9                      Subjective: Pt states that his balance is a 6/10 today. He notes that he has to get his brain examined because his doctors think he is on too much medication. He presents with his own coffee cup from [employer].       Objective: See treatment diary below      Assessment: Pt participated in a skilled PT session focused on balance, gait, and strengthening. Significant difficulty staying on task and following commands in session. Maximal challenge with gait + HT, pt unable to follow commands and keep walking. Required modeling for therapeutic exercise. Instructed to perform seated LAQ and seated march at home. Pt will continue to benefit from skilled PT so that he can navigate his environment with increased safety and stability.       Plan: Continue per plan of care.      Precautions: DM, COPD, Cataracts, knee OA, schizophrenia, smoking, history of drug use, BPH  POC expires: 2024      Tests and Measures 10/23 10/28           ABC 51.25%            FGA             TUG 12.81s            10mWT             vitals  BP: 142/81mmHg HR: 66bpm           5xSTS 26.84s            Neuro Re-Ed             Foam Square             hurdles  middle in gait belt x5 hurdles fwd & sideways           Gait + HT  X8 significant difficulty following commands           Foam beam              Tandem gait              Turning drills              Step taps              Ther Ex             Step ups             STS  Holding med ball 2x10 8#            Long arc quads   7.5# 3x10           Seated marching  7.5# AW 3x10           Standing heel raises  3x10 in //            Mini squats in //  3x10 in //                                       Ther Activity                                       Gait Training                                       Modalities

## 2024-10-28 NOTE — TELEPHONE ENCOUNTER
MSW attempted outreach to Esme Randolph, to follow up on patient's visit this day.  Patient reports initially that he did not get breakfast today.  However, during later discussion patient informed he last ate at dinner on 10/27 and that he doesn't always like the food but it is always available to him and he was able to drink coffee this morning.  MSW expressed understanding of same.  MSW discussed calling patient contact, Esme Randolph, to support patient with scheduling testing/appointments as discussed at today's visit and patient in agreement with same.      Call then placed to Esme Randolph, to follow up on patient's visit/scheduling. However, Esme was not available at time of call.  MSW left voicemail requesting a return call to discuss these items and call back number left.  MSW will remain available as needed.

## 2024-10-28 NOTE — PROGRESS NOTES
[facility]  [address], [address]  [place] [address] [phone]    Care Conference: Resources Provided    MSW participated in today's conference and provided the following resources, along with a copy of care plan:  [address]  [place] [address]    General Information  - 10 Ways to Love Your Brain  - Memory loss ladder    Safety  - CDC fall prevention / home safety checklist    Community Resources  - [facility] Resource Guide (contains information about higher levels of care, homecare, etc.)  - Transportation Options    Resources provided at initial assessment: None

## 2024-10-29 NOTE — TELEPHONE ENCOUNTER
MSW received return call from patient's care coordinator, Esme, this day to follow up on support requested.  MSW reviewed possible support towards patient in assisting to schedule follow up appointment for this office from yesterdays visit and with neuropsychology as previously discussed.  Esme in agreement and expressed she will work with patient to support in scheduling these tests/follow ups.  MSW expressed understanding and through further conversation confirmed that at this time all patient's needs are met and patient will often skip breakfast to sleep but they always have food available.  MSW expressed understanding of same and answered all questions at this time.  MSW provided main number to schedule follow up appointment and satisfied with same.  MSW will remain available as needed.

## 2024-10-30 ENCOUNTER — OFFICE VISIT (OUTPATIENT)
Facility: CLINIC | Age: 69
End: 2024-10-30
Payer: MEDICARE

## 2024-10-30 DIAGNOSIS — R26.2 AMBULATORY DYSFUNCTION: Primary | ICD-10-CM

## 2024-10-30 DIAGNOSIS — F20.9 SCHIZOPHRENIA, UNSPECIFIED TYPE (HCC): ICD-10-CM

## 2024-10-30 PROCEDURE — 97110 THERAPEUTIC EXERCISES: CPT

## 2024-10-30 PROCEDURE — 97112 NEUROMUSCULAR REEDUCATION: CPT

## 2024-10-30 NOTE — PROGRESS NOTES
Daily Note     Today's date: 10/30/2024  Patient name: Bryce Silver  : 1955  MRN: [id number]  Referring provider: Regan Perez,*  Dx:   Encounter Diagnosis     ICD-10-CM    1. Ambulatory dysfunction  R26.2       2. Schizophrenia, unspecified type (HCC)  F20.9                        Subjective: PT presents with a shaving cut today on his chin. He is doing okay. He states that his computer was confiscated by the [employer]. He states that he is walking home today.       Objective: See treatment diary below      Assessment: Pt participated in a skilled PT session focused on balance, gait, and strengthening. Patient exhibited increased impulsivity today, with random skipping noted at random times. Pt could not attend to task without use of physical modeling of exercises. Instructed pt to stay in the lobby and monitored closely due to pt reports of elopement from appointments in the past. Pt will continue to benefit from skilled PT so that he can navigate his environment with increased safety and stability.       Plan: Continue per plan of care.      Precautions: DM, COPD, Cataracts, knee OA, schizophrenia, smoking, history of drug use, BPH, potential for elopment.   POC expires: 2024      Tests and Measures 10/23 10/28 10/30          ABC 51.25%            FGA             TUG 12.81s            10mWT             vitals  BP: 142/81mmHg HR: 66bpm           5xSTS 26.84s            Neuro Re-Ed             Foam Square             hurdles  middle in gait belt x5 hurdles fwd & sideways Middle gait belt x5 hurdles fwd and sideways          Gait + HT  X8 significant difficulty following commands X8 significant difficulty following commands           Foam beam              Tandem gait              Turning drills              Step taps              Ther Ex             Step ups             STS  Holding med ball 2x10 8#  Holding 10lb KB 2x10           Long arc quads   7.5# 3x10 7.5# 3x10          Seated  marching  7.5# AW 3x10 7.5# AW 3x10          Standing heel raises  3x10 in //  3x10 in //           Mini squats in //  3x10 in //  3x10 in //                                     Ther Activity                                       Gait Training                                       Modalities

## 2024-11-04 ENCOUNTER — OFFICE VISIT (OUTPATIENT)
Facility: CLINIC | Age: 69
End: 2024-11-04
Payer: MEDICARE

## 2024-11-04 DIAGNOSIS — F20.9 SCHIZOPHRENIA, UNSPECIFIED TYPE (HCC): ICD-10-CM

## 2024-11-04 DIAGNOSIS — R26.2 AMBULATORY DYSFUNCTION: Primary | ICD-10-CM

## 2024-11-04 PROCEDURE — 97112 NEUROMUSCULAR REEDUCATION: CPT

## 2024-11-04 NOTE — PROGRESS NOTES
Daily Note     Today's date: 2024  Patient name: Bryce Silver  : 1955  MRN: [id number]  Referring provider: Regan Perez,*  Dx:   Encounter Diagnosis     ICD-10-CM    1. Ambulatory dysfunction  R26.2       2. Schizophrenia, unspecified type (HCC)  F20.9           Start Time: 1430          Subjective: Reports that he feels мария lousy. Feeling tired.       Objective: See treatment diary below      Assessment: Tolerated treatment well. Continued per primary therapist Poc. Continues to require cuing to perform activities correctly with correct volume. Some loss of balance - corrected with stepping strategy typically. Patient demonstrated fatigue post treatment, exhibited good technique with therapeutic exercises, and would benefit from continued PT      Plan: Continue per plan of care.  Progress treatment as tolerated.       Precautions: DM, COPD, Cataracts, knee OA, schizophrenia, smoking, history of drug use, BPH, potential for elopment.   POC expires: 2024      Tests and Measures 10/23 10/28 10/30 11/4         ABC 51.25%            FGA             TUG 12.81s            10mWT             vitals  BP: 142/81mmHg HR: 66bpm           5xSTS 26.84s            Neuro Re-Ed             Foam Square             hurdles  middle in gait belt x5 hurdles fwd & sideways Middle gait belt x5 hurdles fwd and sideways Middle GB CGA x1  with #TT x4 laps fwd          Gait + HT  X8 significant difficulty following commands X8 significant difficulty following commands  X4 laps trouble following commands         Side step    With ball toss x5 laps          Suitcase carry     X3 laps          Foam beam              Tandem gait     X3 laps          Turning drills              Step taps              Ther Ex             Step ups             STS  Holding med ball 2x10 8#  Holding 10lb KB 2x10  Holding #10 KB 2x10          Long arc quads   7.5# 3x10 7.5# 3x10 7.5# 3x10          Seated marching  7.5# AW 3x10  7.5# AW 3x10 7.5# 3x10          Standing heel raises  3x10 in //  3x10 in //           Mini squats in //  3x10 in //  3x10 in //                                     Ther Activity                                       Gait Training                                       Modalities

## 2024-11-07 ENCOUNTER — OFFICE VISIT (OUTPATIENT)
Dept: FAMILY MEDICINE CLINIC | Facility: HOSPITAL | Age: 69
End: 2024-11-07
Payer: MEDICARE

## 2024-11-07 VITALS
SYSTOLIC BLOOD PRESSURE: 98 MMHG | OXYGEN SATURATION: 99 % | BODY MASS INDEX: 22.81 KG/M2 | HEART RATE: 65 BPM | DIASTOLIC BLOOD PRESSURE: 60 MMHG | WEIGHT: 171 LBS

## 2024-11-07 DIAGNOSIS — J42 CHRONIC BRONCHITIS, UNSPECIFIED CHRONIC BRONCHITIS TYPE (HCC): ICD-10-CM

## 2024-11-07 DIAGNOSIS — H61.23 BILATERAL IMPACTED CERUMEN: Primary | ICD-10-CM

## 2024-11-07 PROCEDURE — 99213 OFFICE O/P EST LOW 20 MIN: CPT

## 2024-11-07 PROCEDURE — 69209 REMOVE IMPACTED EAR WAX UNI: CPT

## 2024-11-07 RX ORDER — ALBUTEROL SULFATE 90 UG/1
2 INHALANT RESPIRATORY (INHALATION) EVERY 6 HOURS PRN
Start: 2024-11-07

## 2024-11-07 NOTE — ASSESSMENT & PLAN NOTE
- Refill provided, pharmacy confirmed    Orders:    albuterol (PROVENTIL HFA,VENTOLIN HFA) 90 mcg/act inhaler; Inhale 2 puffs every 6 (six) hours as needed for wheezing

## 2024-11-07 NOTE — PROGRESS NOTES
Ambulatory Visit  Name: Bryce Silver      : 1955      MRN: [id number]  Encounter Provider: Otilia Vincent DO  Encounter Date: 2024   Encounter department: [facility] [address]    Assessment & Plan  Bilateral impacted cerumen  - successful  - F/u for next visit with PCP    Orders:    Ear cerumen removal    Chronic bronchitis, unspecified chronic bronchitis type (HCC)  - Refill provided, pharmacy confirmed    Orders:    albuterol (PROVENTIL HFA,VENTOLIN HFA) 90 mcg/act inhaler; Inhale 2 puffs every 6 (six) hours as needed for wheezing       History of Present Illness     69-year-old male presents for the following:    Bilateral impacted cerumen  -Request ear cleaning    Also request refill of albuterol        History obtained from : patient  Review of Systems   All other systems reviewed and are negative.    Current Outpatient Medications on File Prior to Visit   Medication Sig Dispense Refill    acetaminophen (TYLENOL) 500 mg tablet Take 1 tablet (500 mg total) by mouth every 8 (eight) hours as needed for mild pain or headaches 90 tablet 1    aspirin 81 mg chewable tablet Chew 81 mg daily      b complex vitamins capsule Take 1 capsule by mouth daily 30 capsule 6    benztropine (COGENTIN) 1 mg tablet 1 twice daily and 1 as needed for EPS      buPROPion (WELLBUTRIN XL) 300 mg 24 hr tablet Take 300 mg by mouth daily      Diclofenac Sodium (VOLTAREN) 1 % Apply 2 g topically 3 (three) times a day      folic acid (FOLVITE) 1 mg tablet Take 1 tablet (1 mg total) by mouth daily 30 tablet 6    lurasidone (LATUDA) 120 mg tablet Take by mouth 1 daily after dinner      meclizine (ANTIVERT) 12.5 MG tablet 1 bid as needed for nausea      meloxicam (MOBIC) 15 mg tablet Take 1 tablet (15 mg total) by mouth in the morning 30 tablet 3    Menthol-Methyl Salicylate (JOSE ANGEL GUZMÁN GREASELESS) 10-15 % greaseless cream Apply topically 4 (four) times a day as needed for muscle/joint pain 30 g 3     metFORMIN (GLUCOPHAGE) 500 mg tablet Take 500 mg by mouth 2 (two) times a day with meals      Multiple Vitamins-Minerals (MULTIVITAMIN WITH MINERALS) tablet Take 1 tablet by mouth daily      Omega-3 Fatty Acids (fish oil) 1,000 mg Take 1,000 mg by mouth daily      omeprazole (PriLOSEC) 40 MG capsule Take 1 capsule (40 mg total) by mouth daily 90 capsule 3    paliperidone (INVEGA) 9 MG 24 hr tablet Take 9 mg by mouth every morning      polyethylene glycol (MIRALAX) 17 g packet Take 17 g by mouth daily 14 each 3    pravastatin (PRAVACHOL) 40 mg tablet Take 1 tablet (40 mg total) by mouth daily at bedtime 30 tablet 5    sertraline (ZOLOFT) 100 mg tablet 1 daily      tamsulosin (FLOMAX) 0.4 mg Take 1 capsule (0.4 mg total) by mouth daily with dinner (Patient taking differently: 1 tab with dinner and 1 tab at bedtime) 90 capsule 3    [DISCONTINUED] albuterol (PROVENTIL HFA,VENTOLIN HFA) 90 mcg/act inhaler Inhale 2 puffs every 6 (six) hours as needed for wheezing 8 g 5    meclizine (ANTIVERT) 12.5 MG tablet Take 1 tablet (12.5 mg total) by mouth 3 (three) times a day as needed for dizziness (Patient not taking: Reported on 11/7/2024) 30 tablet 0    Naloxone HCl (NARCAN NA) into each nostril Administer nasally when suspected overdose (Patient not taking: Reported on 10/18/2024)      [DISCONTINUED] haloperidol (HALDOL) 5 mg tablet Take 5 mg by mouth in the morning (Patient not taking: Reported on 11/7/2024)      [DISCONTINUED] mupirocin (BACTROBAN) 2 % ointment Apply topically 2 (two) times a day for 7 days (Patient not taking: Reported on 11/7/2024) 1 g 1    [DISCONTINUED] nicotine polacrilex (COMMIT) 2 MG lozenge Apply 1 lozenge (2 mg total) to the mouth or throat every 3 (three) hours as needed for smoking cessation (Patient not taking: Reported on 9/23/2024) 168 lozenge 1     No current facility-administered medications on file prior to visit.      Social History     Tobacco Use    Smoking status: Every Day     Current  packs/day: 1.00     Average packs/day: 1 pack/day for 45.0 years (45.0 ttl pk-yrs)     Types: Cigarettes    Smokeless tobacco: Never   Vaping Use    Vaping status: Never Used   Substance and Sexual Activity    Alcohol use: No     Comment: hx. of hard liquor, regular use, quit at age 27.    Drug use: No    Sexual activity: Not Currently         Objective     BP 98/60   Pulse 65   Wt 77.6 kg (171 lb)   SpO2 99%   BMI 22.81 kg/m²     Physical Exam  Constitutional:       Appearance: Normal appearance.   HENT:      Head: Normocephalic and atraumatic.      Right Ear: There is impacted cerumen.      Left Ear: There is impacted cerumen.   Neurological:      Mental Status: He is alert.   Psychiatric:         Mood and Affect: Mood normal.         Behavior: Behavior normal.           Ear cerumen removal    Date/Time: 11/7/2024 3:20 PM    Performed by: Otilia Vincent DO  Authorized by: Otilia Vincent DO  Universal Protocol:  Consent: Verbal consent obtained.  Consent given by: patient  Patient understanding: patient states understanding of the procedure being performed    Patient location:  Clinic  Procedure details:     Local anesthetic:  None    Location:  Both ears    Procedure type: irrigation only      Approach:  External    Equipment used:  Spray bottle  Post-procedure details:     Complication:  None    Hearing quality:  Improved    Patient tolerance of procedure:  Tolerated well, no immediate complications    Otilia Vincent DO  Family Medicine

## 2024-11-14 ENCOUNTER — OFFICE VISIT (OUTPATIENT)
Dept: FAMILY MEDICINE CLINIC | Facility: HOSPITAL | Age: 69
End: 2024-11-14
Payer: MEDICARE

## 2024-11-14 VITALS — BODY MASS INDEX: 23.16 KG/M2 | WEIGHT: 171 LBS | HEIGHT: 72 IN

## 2024-11-14 DIAGNOSIS — R21 RASH: Primary | ICD-10-CM

## 2024-11-14 PROCEDURE — G2211 COMPLEX E/M VISIT ADD ON: HCPCS

## 2024-11-14 PROCEDURE — 99213 OFFICE O/P EST LOW 20 MIN: CPT

## 2024-11-14 RX ORDER — HYDROCORTISONE 25 MG/G
CREAM TOPICAL 3 TIMES DAILY
Qty: 20 G | Refills: 0 | Status: SHIPPED | OUTPATIENT
Start: 2024-11-14 | End: 2024-11-21

## 2024-11-14 NOTE — ASSESSMENT & PLAN NOTE
- Concern for callus  - No signs of acute infection  - Red flaky skin as well    Plan:  - Hydrocortisone cream TID x7 days  - F/u PRN  Orders:    hydrocortisone 2.5 % cream; Apply topically 3 (three) times a day for 7 days

## 2024-11-14 NOTE — PROGRESS NOTES
Name: Bryce Silver      : 1955      MRN: [id number]  Encounter Provider: Otilia Vincent DO  Encounter Date: 2024   Encounter department: [facility] [address]  :  Assessment & Plan  Rash  - Concern for callus  - No signs of acute infection  - Red flaky skin as well    Plan:  - Hydrocortisone cream TID x7 days  - F/u PRN  Orders:    hydrocortisone 2.5 % cream; Apply topically 3 (three) times a day for 7 days           History of Present Illness     70yo male presents for unhealed wound present for many years after running into a piece of furniture. No recent change to wound. No pain. No other concerns.      Review of Systems   Skin:  Positive for color change and rash.     Current Outpatient Medications on File Prior to Visit   Medication Sig Dispense Refill    acetaminophen (TYLENOL) 500 mg tablet Take 1 tablet (500 mg total) by mouth every 8 (eight) hours as needed for mild pain or headaches 90 tablet 1    albuterol (PROVENTIL HFA,VENTOLIN HFA) 90 mcg/act inhaler Inhale 2 puffs every 6 (six) hours as needed for wheezing      aspirin 81 mg chewable tablet Chew 81 mg daily      b complex vitamins capsule Take 1 capsule by mouth daily 30 capsule 6    benztropine (COGENTIN) 1 mg tablet 1 twice daily and 1 as needed for EPS      buPROPion (WELLBUTRIN XL) 300 mg 24 hr tablet Take 300 mg by mouth daily      Diclofenac Sodium (VOLTAREN) 1 % Apply 2 g topically 3 (three) times a day      folic acid (FOLVITE) 1 mg tablet Take 1 tablet (1 mg total) by mouth daily 30 tablet 6    lurasidone (LATUDA) 120 mg tablet Take by mouth 1 daily after dinner      meclizine (ANTIVERT) 12.5 MG tablet 1 bid as needed for nausea      meclizine (ANTIVERT) 12.5 MG tablet Take 1 tablet (12.5 mg total) by mouth 3 (three) times a day as needed for dizziness (Patient not taking: Reported on 2024) 30 tablet 0    meloxicam (MOBIC) 15 mg tablet Take 1 tablet (15 mg total) by mouth in the morning  "30 tablet 3    Menthol-Methyl Salicylate (JOSE ANGEL GUZMÁN GREASELESS) 10-15 % greaseless cream Apply topically 4 (four) times a day as needed for muscle/joint pain 30 g 3    metFORMIN (GLUCOPHAGE) 500 mg tablet Take 500 mg by mouth 2 (two) times a day with meals      Multiple Vitamins-Minerals (MULTIVITAMIN WITH MINERALS) tablet Take 1 tablet by mouth daily      Naloxone HCl (NARCAN NA) into each nostril Administer nasally when suspected overdose (Patient not taking: Reported on 10/18/2024)      Omega-3 Fatty Acids (fish oil) 1,000 mg Take 1,000 mg by mouth daily      omeprazole (PriLOSEC) 40 MG capsule Take 1 capsule (40 mg total) by mouth daily 90 capsule 3    paliperidone (INVEGA) 9 MG 24 hr tablet Take 9 mg by mouth every morning      polyethylene glycol (MIRALAX) 17 g packet Take 17 g by mouth daily 14 each 3    pravastatin (PRAVACHOL) 40 mg tablet Take 1 tablet (40 mg total) by mouth daily at bedtime 30 tablet 5    sertraline (ZOLOFT) 100 mg tablet 1 daily      tamsulosin (FLOMAX) 0.4 mg Take 1 capsule (0.4 mg total) by mouth daily with dinner (Patient taking differently: 1 tab with dinner and 1 tab at bedtime) 90 capsule 3     No current facility-administered medications on file prior to visit.      Social History     Tobacco Use    Smoking status: Every Day     Current packs/day: 1.00     Average packs/day: 1 pack/day for 45.0 years (45.0 ttl pk-yrs)     Types: Cigarettes    Smokeless tobacco: Never   Vaping Use    Vaping status: Never Used   Substance and Sexual Activity    Alcohol use: No     Comment: hx. of hard liquor, regular use, quit at age 27.    Drug use: No    Sexual activity: Not Currently        Objective   Ht 6' 0.05\" (1.83 m)   Wt 77.6 kg (171 lb)   BMI 23.16 kg/m²      Physical Exam  Constitutional:       Appearance: Normal appearance.   Skin:     Findings: Lesion and rash present.      Comments: Raised plaque on patellar surface with pitting scars. Some erythema and flaking skin. No signs of acute " infection   Neurological:      Mental Status: He is alert.   Psychiatric:         Mood and Affect: Mood normal.         Behavior: Behavior normal.     Otilia Vincent,   Family Medicine

## 2024-11-15 ENCOUNTER — OFFICE VISIT (OUTPATIENT)
Facility: CLINIC | Age: 69
End: 2024-11-15
Payer: MEDICARE

## 2024-11-15 DIAGNOSIS — F20.9 SCHIZOPHRENIA, UNSPECIFIED TYPE (HCC): ICD-10-CM

## 2024-11-15 DIAGNOSIS — R26.2 AMBULATORY DYSFUNCTION: Primary | ICD-10-CM

## 2024-11-15 PROCEDURE — 97110 THERAPEUTIC EXERCISES: CPT

## 2024-11-15 PROCEDURE — 97112 NEUROMUSCULAR REEDUCATION: CPT

## 2024-11-15 NOTE — PROGRESS NOTES
Daily Note     Today's date: 11/15/2024  Patient name: Bryce Silver  : 1955  MRN: [id number]  Referring provider: Regan Perez,*  Dx:   Encounter Diagnosis     ICD-10-CM    1. Ambulatory dysfunction  R26.2       2. Schizophrenia, unspecified type (HCC)  F20.9                        Subjective:  Pt states that he is trying to get his schizophrenia medication changed. He is talking with his psychiatrist about it. Pt states that he is trying to stay busy taking out the trash. His sister bought a house in [place]. He notes he is surprised by the amount of money he is getting from taking out the trash. He has not had any falls. Sometimes he feels like he may fall down at the end of the day. He notes that he thinks his medication is what is making him feel like his going to fall down.       Objective: See treatment diary below      Assessment: Pt participated in a skilled PT session focused on balance, gait, and strengthening. Pt exhibited improved success with gait + HT with addition of boomwhacker taps today. Resistance progressed for STS today with no LOB. Patient demonstrated fatigue post treatment, exhibited good technique with therapeutic exercises, and would benefit from continued PT      Plan: Continue per plan of care.  Progress treatment as tolerated.       Precautions: DM, COPD, Cataracts, knee OA, schizophrenia, smoking, history of drug use, BPH, potential for elopment.   POC expires: 2024      Tests and Measures 10/23 10/28 10/30 11/4 11/15        ABC 51.25%            FGA             TUG 12.81s            10mWT             vitals  BP: 142/81mmHg HR: 66bpm           5xSTS 26.84s            Neuro Re-Ed             Foam Square             hurdles  middle in gait belt x5 hurdles fwd & sideways Middle gait belt x5 hurdles fwd and sideways Middle GB CGA x1  with #TT x4 laps fwd  Middle GB CGA x1 with #TT x4 laps fwd        Gait + HT  X8 significant difficulty following commands X8  significant difficulty following commands  X4 laps trouble following commands X4 laps touble following commands with boomwhacker taps today         Side step    With ball toss x5 laps          Suitcase carry     X3 laps  X3 laps        Foam beam              Tandem gait     X3 laps  X3 laps        Turning drills              Step taps              Ther Ex             Step ups             STS  Holding med ball 2x10 8#  Holding 10lb KB 2x10  Holding #10 KB 2x10  Holding #10lb KB 2x10        Long arc quads   7.5# 3x10 7.5# 3x10 7.5# 3x10  7.5# 3x10        Seated marching  7.5# AW 3x10 7.5# AW 3x10 7.5# 3x10  7.5# 3x10        Standing heel raises  3x10 in //  3x10 in //   3x10 in //         Mini squats in //  3x10 in //  3x10 in //   3x10 in //                                   Ther Activity                                       Gait Training                                       Modalities

## 2024-11-18 ENCOUNTER — OFFICE VISIT (OUTPATIENT)
Facility: CLINIC | Age: 69
End: 2024-11-18
Payer: MEDICARE

## 2024-11-18 DIAGNOSIS — R26.2 AMBULATORY DYSFUNCTION: Primary | ICD-10-CM

## 2024-11-18 DIAGNOSIS — F20.9 SCHIZOPHRENIA, UNSPECIFIED TYPE (HCC): ICD-10-CM

## 2024-11-18 PROCEDURE — 97112 NEUROMUSCULAR REEDUCATION: CPT

## 2024-11-18 PROCEDURE — 97110 THERAPEUTIC EXERCISES: CPT

## 2024-11-18 NOTE — PROGRESS NOTES
Daily Note     Today's date: 2024  Patient name: Bryce Silver  : 1955  MRN: [id number]  Referring provider: Regan Perez,*  Dx:   Encounter Diagnosis     ICD-10-CM    1. Ambulatory dysfunction  R26.2       2. Schizophrenia, unspecified type (HCC)  F20.9                          Subjective:  Pt states that he is trying to get his schizophrenia medication changed. He is talking with his psychiatrist about it. Pt states that he is trying to stay busy taking out the trash. His sister bought a house in [place]. He notes he is surprised by the amount of money he is getting from taking out the trash. He has not had any falls. Sometimes he feels like he may fall down at the end of the day. He notes that he thinks his medication is what is making him feel like his going to fall down.       Objective: See treatment diary below      Assessment: Pt participated in a skilled PT session focused on balance, gait, and strengthening. Pt exhibited poor attention to session today, inquiring about dynamite being present, and had a very hard time focusing on a task. Increased impulsivity noted with pt jumping about between exercises. Patient demonstrated fatigue post treatment, exhibited good technique with therapeutic exercises, and would benefit from continued PT      Plan: Continue per plan of care.  Progress treatment as tolerated.       Precautions: DM, COPD, Cataracts, knee OA, schizophrenia, smoking, history of drug use, BPH, potential for elopment.   POC expires: 2024      Tests and Measures 10/23 10/28 10/30 11/4 11/15 11/18       ABC 51.25%            FGA             TUG 12.81s            10mWT             vitals  BP: 142/81mmHg HR: 66bpm           5xSTS 26.84s            Neuro Re-Ed             Foam Square             hurdles  middle in gait belt x5 hurdles fwd & sideways Middle gait belt x5 hurdles fwd and sideways Middle GB CGA x1  with #TT x4 laps fwd  Middle GB CGA x1 with #TT x4  laps fwd Middle GB CGA x1 with #TT x4 laps fwd       Gait + HT  X8 significant difficulty following commands X8 significant difficulty following commands  X4 laps trouble following commands X4 laps touble following commands with boomwhacker taps today  X4 laps trouble following command with boom whacker taps today       Side step    With ball toss x5 laps          Suitcase carry     X3 laps  X3 laps x3       Foam beam              Tandem gait     X3 laps  X3 laps X3 laps       Turning drills              Step taps              Ther Ex             Step ups             STS  Holding med ball 2x10 8#  Holding 10lb KB 2x10  Holding #10 KB 2x10  Holding #10lb KB 2x10 Holding #10lb KB 3x10       Long arc quads   7.5# 3x10 7.5# 3x10 7.5# 3x10  7.5# 3x10 7.5# 3x10       Seated marching  7.5# AW 3x10 7.5# AW 3x10 7.5# 3x10  7.5# 3x10 7.5# 3x10       Standing heel raises  3x10 in //  3x10 in //   3x10 in //  3x10 in //        Mini squats in //  3x10 in //  3x10 in //   3x10 in //  3x10 in //        Standing hip abduction      3x10 in //                    Ther Activity                                       Gait Training                                       Modalities

## 2024-11-20 ENCOUNTER — APPOINTMENT (OUTPATIENT)
Facility: CLINIC | Age: 69
End: 2024-11-20
Payer: MEDICARE

## 2024-11-25 ENCOUNTER — OFFICE VISIT (OUTPATIENT)
Facility: CLINIC | Age: 69
End: 2024-11-25
Payer: MEDICARE

## 2024-11-25 DIAGNOSIS — R26.2 AMBULATORY DYSFUNCTION: Primary | ICD-10-CM

## 2024-11-25 PROCEDURE — 97112 NEUROMUSCULAR REEDUCATION: CPT

## 2024-11-25 NOTE — PROGRESS NOTES
Daily Note     Today's date: 2024  Patient name: Bryce Silver  : 1955  MRN: [id number]  Referring provider: Regan Perez,*  Dx:   No diagnosis found.                     Subjective:  P      Objective: See treatment diary below      Assessment: Pt participated in a skilled PT session focused on balance, gait, and strengthening. . Patient demonstrated fatigue post treatment, exhibited good technique with therapeutic exercises, and would benefit from continued PT      Plan: Continue per plan of care.  Progress treatment as tolerated.  RE & Potential D/C NV     Precautions: DM, COPD, Cataracts, knee OA, schizophrenia, smoking, history of drug use, BPH, potential for elopment.   POC expires: 2024    POC Expires Reeval for Medicare to be completed  Unit Limit Auth Expiration Date PT/OT/STVisit Limit   2025 By visit 10 N/A N/A BOMN    Completed on visit                    Auth Status             N/A             Visits Utilized 1 2 3 4 5 6 7      Tests and Measures 10/23 10/28 10/30 11/4 11/15 11/18 11/25      ABC 51.25%            FGA             TUG 12.81s            10mWT             vitals  BP: 142/81mmHg HR: 66bpm           5xSTS 26.84s            Neuro Re-Ed             Foam Square             hurdles  middle in gait belt x5 hurdles fwd & sideways Middle gait belt x5 hurdles fwd and sideways Middle GB CGA x1  with #TT x4 laps fwd  Middle GB CGA x1 with #TT x4 laps fwd Middle GB CGA x1 with #TT x4 laps fwd Middle GB CGA x1 with # TT x4 laps fwd      Gait + HT  X8 significant difficulty following commands X8 significant difficulty following commands  X4 laps trouble following commands X4 laps touble following commands with boomwhacker taps today  X4 laps trouble following command with boom whacker taps today X4 laps trouble following command with boom whacker taps today      Side step    With ball toss x5 laps          Suitcase carry     X3 laps  X3 laps x3 x3      Foam beam               Tandem gait     X3 laps  X3 laps X3 laps X3 laps      Turning drills              Step taps              Ther Ex             Step ups             STS  Holding med ball 2x10 8#  Holding 10lb KB 2x10  Holding #10 KB 2x10  Holding #10lb KB 2x10 Holding #10lb KB 3x10 Holding #10lb KB 3x10      Long arc quads   7.5# 3x10 7.5# 3x10 7.5# 3x10  7.5# 3x10 7.5# 3x10 7.5# 3x10      Seated marching  7.5# AW 3x10 7.5# AW 3x10 7.5# 3x10  7.5# 3x10 7.5# 3x10 7.5# 3x10      Standing heel raises  3x10 in //  3x10 in //   3x10 in //  3x10 in //  3x10 in //       Mini squats in //  3x10 in //  3x10 in //   3x10 in //  3x10 in //  3x10 in //       Standing hip abduction      3x10 in // 3x10 in //                   Ther Activity                                       Gait Training                                       Modalities

## 2024-11-25 NOTE — PROGRESS NOTES
PT Re-Evaluation     Today's date: 2024  Patient name: Bryce Silver  : 1955  MRN: [id number]  Referring provider: Regan Perez,*  Dx:   Encounter Diagnosis     ICD-10-CM    1. Ambulatory dysfunction  R26.2                      Assessment  Impairments: abnormal gait, abnormal movement, activity intolerance, difficulty understanding, impaired balance, impaired physical strength, lacks appropriate home exercise program, safety issue and poor posture   Functional limitations: deconditioning, gait, and transfers    Assessment details: 2024: Pt is a 69 y.o. male who has received 7 visits of skilled PT intervention to address imbalance, poor activity, tolerance, impaired transfers, occasional falls, and reduced functional participation. Today's assessment reflects improvement in all gait and balance measures that pt could attend to. FGA could not be completed due to inability to attend to task and follow instructions. Pt reported a recent med change which has impacted his ability to participate in sessions. Due to lack of ability to participate fully, and improvement in measures, pt to be d/c'ed NV. NV plan to review HEP and d/c.     At IE: Pt is a 69 y.o. male presenting to physical therapy with chief complaint of imbalance, poor activity tolerance, impaired transfers, occasional falls, and reduced functional participation. Assessment today reflects globally reduced muscle strength, most noteably in the hip, core, and LE musculature, which impacts his ability to perform transfers and ambulation tasks. Furthermore, FGA testing reflects 22/30, reflecting increased risk for falls. Pt struggled to convey subjective information and had difficulty describing his loses of balance  Pt was educated on deficits observed in evaluation and expressed understanding. Pt's ABC reflects reduced balance confidence. Pt will benefit from skilled physical therapy intervention addressing muscle strength,  endurance, activity tolerance, and functional deficits so that he/she may be able to navigate their environment with increased independence and safety.   Understanding of Dx/Px/POC: good     Prognosis: good    Goals  STG's: to be achieved within 4 weeks  -Pt will exhibit independence with HEP so that he/she may be able to carry over benefit of skilled PT intervention at home within 2 visits. NOT MET  -Pt will exhibit improvement in FGA by MCID of 8 to reflect clinically significant improvement from skilled PT intervention.Could not complete as pt could not attend to task  -Pt will exhibit improvement in 5xSTS by 4.2s to reflect clinically significant improvement in LE power from skilled PT intervention. NOT MET  -Pt will improve ABC score by MDC of 14% to reflect improvement in balance confidence. MET    LTG's: to be achieved within 8 weeks   -Pt will improve ABC score to >80% to reflect reduced risk of falls. NOT MET  -Pt will exhibit improvement in FGA to >22/30 to reflect reduced risk of falls Could not complete as pt could not attend to task  -Pt will exhibit improvement in 5xSTS to <12s to reflect improvement in LE power and aid in rising from the toilet. MET       Plan  Patient would benefit from: skilled physical therapy  Planned modality interventions: electrical stimulation/Russian stimulation, thermotherapy: hydrocollator packs, biofeedback and cryotherapy    Planned therapy interventions: balance, ADL training, abdominal trunk stabilization, activity modification, balance/weight bearing training, behavior modification, body mechanics training, breathing training, canalith repositioning, neuromuscular re-education, patient education, therapeutic activities, therapeutic exercise, strengthening, sensory integrative techniques, gait training, home exercise program, postural training, graded exercise, graded activity and stretching    Frequency: 2x week  Duration in weeks: 12  Plan of Care beginning date:  10/23/2024  Plan of Care expiration date: 12/18/2024  Treatment plan discussed with: patient  Plan details: D/C To HEP NV        Subjective Evaluation    History of Present Illness  Mechanism of injury: 11/25/2024: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 Pt notes that he occasionally gets lightheaded.     At IE: Pt notes that he had a couple falls in the last year. One occurred when he was walking home from [facility] when his legs got too tired. He notes that he has fallen crossing the street near [address] and was left in the street. He note sometimes he gets dizzy, then keeps on going, and then gets dizzier. He notes that he falls like 6 times a month. He notes that he feels lightheaded when he is on his medication. When asked to clarify what medication makes him dizzy, pt clarified that it was LSD that made him lightheaded, but says he hasn't taken it in years.  He notes that he sometimes can't get off the floor but most of the time he can. He notes that he just had an operation on his legs and he has holes in them. He notes that he sometimes gets numbness and tingling in his feet.  Patient Goals  Patient goals for therapy: increased strength and improved balance    Pain  No pain reported    Social Support  Steps to enter house: yes  Stairs in house: yes   12  Lives in: community-based residential facility  Lives with: alone    Employment status: working  Hand dominance: right    Treatments  Previous treatment: medication      Objective     Strength/Myotome Testing     Left Hip   Planes of Motion   Flexion: 5  Abduction: 5    Right Hip   Planes of Motion   Flexion: 5  Abduction: 5    Left Knee   Extension: 5    Right Knee   Extension: 5    Left Ankle/Foot   Dorsiflexion: 5    Right Ankle/Foot   Dorsiflexion: 5  Neuro Exam:     Sensation   Light touch LE: left WNL and right WNL        Tests and Measures 10/23 10/28 10/30 11/4 11/15 11/18 11/25      ABC 51.25%      71.25%      FGA 22/30      Could not  be re-tested due to poor attention to task      TUG 12.81s      10.99s      10mWT             vitals  BP: 142/81mmHg HR: 66bpm           5xSTS 26.84s      12.25s      Neuro Re-Ed             Foam Square             hurdles  middle in gait belt x5 hurdles fwd & sideways Middle gait belt x5 hurdles fwd and sideways Middle GB CGA x1  with #TT x4 laps fwd  Middle GB CGA x1 with #TT x4 laps fwd Middle GB CGA x1 with #TT x4 laps fwd       Gait + HT  X8 significant difficulty following commands X8 significant difficulty following commands  X4 laps trouble following commands X4 laps touble following commands with boomwhacker taps today  X4 laps trouble following command with boom whacker taps today       Side step    With ball toss x5 laps          Suitcase carry     X3 laps  X3 laps x3       Foam beam              Tandem gait     X3 laps  X3 laps X3 laps       Turning drills              Step taps              Ther Ex             Step ups             STS  Holding med ball 2x10 8#  Holding 10lb KB 2x10  Holding #10 KB 2x10  Holding #10lb KB 2x10 Holding #10lb KB 3x10 Holding #10lb KB 3x10      Long arc quads   7.5# 3x10 7.5# 3x10 7.5# 3x10  7.5# 3x10 7.5# 3x10 7.5# 3x10      Seated marching  7.5# AW 3x10 7.5# AW 3x10 7.5# 3x10  7.5# 3x10 7.5# 3x10 7.5# 3x10      Standing heel raises  3x10 in //  3x10 in //   3x10 in //  3x10 in //  3x10 in //       Mini squats in //  3x10 in //  3x10 in //   3x10 in //  3x10 in //  3x10 in //       Standing hip abduction      3x10 in // 3x10 in //       Tests and measures       Attempted FGA, ABC, 10mWT, 5xSTS, TUG      Ther Activity                                       Gait Training                                       Modalities                                            Precautions: DM, COPD, Cataracts, knee OA, schizophrenia, smoking, history of drug use, BPH  POC expires: 12/18/2024

## 2024-11-27 ENCOUNTER — OFFICE VISIT (OUTPATIENT)
Facility: CLINIC | Age: 69
End: 2024-11-27
Payer: MEDICARE

## 2024-11-27 ENCOUNTER — TELEPHONE (OUTPATIENT)
Age: 69
End: 2024-11-27

## 2024-11-27 DIAGNOSIS — F20.9 SCHIZOPHRENIA, UNSPECIFIED TYPE (HCC): ICD-10-CM

## 2024-11-27 DIAGNOSIS — F10.11 HISTORY OF ALCOHOL ABUSE: ICD-10-CM

## 2024-11-27 DIAGNOSIS — R26.2 AMBULATORY DYSFUNCTION: Primary | ICD-10-CM

## 2024-11-27 PROCEDURE — 97110 THERAPEUTIC EXERCISES: CPT

## 2024-11-27 RX ORDER — VITAMIN B COMPLEX
1 CAPSULE ORAL DAILY
Qty: 30 CAPSULE | Refills: 6 | Status: SHIPPED | OUTPATIENT
Start: 2024-11-27

## 2024-11-27 NOTE — PROGRESS NOTES
Daily Note     Today's date: 2024  Patient name: Bryce Silver  : 1955  MRN: [id number]  Referring provider: Regan Perez,*  Dx:   Encounter Diagnosis     ICD-10-CM    1. Ambulatory dysfunction  R26.2       2. Schizophrenia, unspecified type (HCC)  F20.9           Start Time: 1342  Stop Time: 1415  Total time in clinic (min): 33 minutes    Subjective: Bryce reports today is his last day.       Objective: See treatment diary below      Assessment: Bryce Tolerated treatment fair. He has plateau in his therapy and will be d/c at this time. Issued and reviewed HEP for d/c. Demonstrates good understating of HEP proved ed today.        Plan:  d/c PT to HEP     Precautions: DM, COPD, Cataracts, knee OA, schizophrenia, smoking, history of drug use, BPH  POC expires: 2024          Tests and Measures 10/23 10/28 10/30 11/4 11/15 11/18 11/25      ABC 51.25%      71.25%      FGA       Could not be re-tested due to poor attention to task      TUG 12.81s      10.99s      10mWT             vitals  BP: 142/81mmHg HR: 66bpm           5xSTS 26.84s      12.25s      Neuro Re-Ed             Foam Square             hurdles  middle in gait belt x5 hurdles fwd & sideways Middle gait belt x5 hurdles fwd and sideways Middle GB CGA x1  with #TT x4 laps fwd  Middle GB CGA x1 with #TT x4 laps fwd Middle GB CGA x1 with #TT x4 laps fwd       Gait + HT  X8 significant difficulty following commands X8 significant difficulty following commands  X4 laps trouble following commands X4 laps touble following commands with boomwhacker taps today  X4 laps trouble following command with boom whacker taps today       Side step    With ball toss x5 laps     Card place and p/u 10x      Suitcase carry     X3 laps  X3 laps x3       Foam beam              Tandem gait     X3 laps  X3 laps X3 laps       Turning drills              Step taps              Ther Ex             Step ups             STS  Holding med ball 2x10 8#   Holding 10lb KB 2x10  Holding #10 KB 2x10  Holding #10lb KB 2x10 Holding #10lb KB 3x10 Holding #10lb KB 3x10 Mini squats 2x10 HEP      Long arc quads   7.5# 3x10 7.5# 3x10 7.5# 3x10  7.5# 3x10 7.5# 3x10 7.5# 3x10 2x10 HEP      Seated marching  7.5# AW 3x10 7.5# AW 3x10 7.5# 3x10  7.5# 3x10 7.5# 3x10 7.5# 3x10 Std marching HEP 2x10      Standing heel raises  3x10 in //  3x10 in //   3x10 in //  3x10 in //  3x10 in //  2x10 HEP     Mini squats in //  3x10 in //  3x10 in //   3x10 in //  3x10 in //  3x10 in //  2x10 HEP     Standing hip abduction      3x10 in // 3x10 in //  2x10 ea abd ext HEP     Tests and measures       Attempted FGA, ABC, 10mWT, 5xSTS, TUG      Ther Activity                                       Gait Training                                       Modalities

## 2024-11-27 NOTE — TELEPHONE ENCOUNTER
Danica from [facility] called in regarding patients script for vitamin B. Patient script is . The previous script is the super B complex vitamins. Please send to [facility] services in [place].

## 2024-12-01 ENCOUNTER — HOSPITAL ENCOUNTER (EMERGENCY)
Facility: HOSPITAL | Age: 69
Discharge: HOME | End: 2024-12-01
Attending: EMERGENCY MEDICINE | Admitting: EMERGENCY MEDICINE
Payer: MEDICARE

## 2024-12-01 VITALS
HEART RATE: 63 BPM | SYSTOLIC BLOOD PRESSURE: 120 MMHG | DIASTOLIC BLOOD PRESSURE: 72 MMHG | TEMPERATURE: 97.4 F | RESPIRATION RATE: 20 BRPM | OXYGEN SATURATION: 98 %

## 2024-12-01 DIAGNOSIS — T14.8XXA WOUND OF SKIN: Primary | ICD-10-CM

## 2024-12-01 PROCEDURE — 99283 EMERGENCY DEPT VISIT LOW MDM: CPT

## 2024-12-01 PROCEDURE — 99284 EMERGENCY DEPT VISIT MOD MDM: CPT | Performed by: EMERGENCY MEDICINE

## 2024-12-01 RX ORDER — MUPIROCIN 20 MG/G
OINTMENT TOPICAL 3 TIMES DAILY
Qty: 15 G | Refills: 0 | Status: SHIPPED | OUTPATIENT
Start: 2024-12-01 | End: 2024-12-05 | Stop reason: SDUPTHER

## 2024-12-01 NOTE — ED PROVIDER NOTES
Time reflects when diagnosis was documented in both MDM as applicable and the Disposition within this note       Time User Action Codes Description Comment    12/1/2024 11:07 AM Domo Armstrong Add [T14.8XXA] Wound of skin           ED Disposition       ED Disposition   Discharge    Condition   Stable    Date/Time   Sun Dec 1, 2024 11:06 AM    Comment   Bryce Nadeem discharge to home/self care.                   Assessment & Plan       Medical Decision Making  61-year-old male, presents for evaluation of wound to left knee.  Differential diagnosis includes cellulitis, wound, septic arthritis among other diagnoses.  On exam, left knee with no swelling or tenderness, full range of motion without pain, no concern for effusion or septic arthritis.  Has healing wounds with good pink tissue over the left patella, no drainage, no erythema.  Patient states that he has been picking at it frequently, instructed to stop.  Will prescribe topical antibiotic ointment, instructed to clean area daily.    Risk  Prescription drug management.             Medications - No data to display    ED Risk Strat Scores                           SBIRT 20yo+      Flowsheet Row Most Recent Value   Initial Alcohol Screen: US AUDIT-C     1. How often do you have a drink containing alcohol? 0 Filed at: 12/01/2024 1050   2. How many drinks containing alcohol do you have on a typical day you are drinking?  0 Filed at: 12/01/2024 1050   3a. Male UNDER 65: How often do you have five or more drinks on one occasion? 0 Filed at: 12/01/2024 1050   3b. FEMALE Any Age, or MALE 65+: How often do you have 4 or more drinks on one occassion? 0 Filed at: 12/01/2024 1050   Audit-C Score 0 Filed at: 12/01/2024 1050   DINO: How many times in the past year have you...    Used an illegal drug or used a prescription medication for non-medical reasons? Never Filed at: 12/01/2024 1050                            History of Present Illness       Chief Complaint   Patient  "presents with    Knee Pain     Pt states \"Well I bumped it and blew it and they took a needle and sucked the blood out and now it broke open again it seems to be oozing\"        Past Medical History:   Diagnosis Date    Atopic rhinitis     BPH with urinary obstruction     Cataracts, bilateral     Cholelithiasis     Chronic post-traumatic headache     Colon polyps     Constipation     COPD (chronic obstructive pulmonary disease) (HCC)     Diabetes mellitus (HCC)     Esophagitis     Glenohumeral arthritis, right     Hyperlipidemia     Incomplete bladder emptying     Nocturia     Refractive error     B/L    Retinopathy, bilateral     Schizophrenia (HCC)     Tinnitus     Umbilical hernia without obstruction and without gangrene     Unspecified rotator cuff tear or rupture of right shoulder, not specified as traumatic     Urinary retention       Past Surgical History:   Procedure Laterality Date    VT RPR AA HERNIA 1ST < 3 CM REDUCIBLE N/A 7/16/2024    Procedure: REPAIR HERNIA UMBILICAL;  Surgeon: Quang Lorenzo MD;  Location:  MAIN OR;  Service: General    RIB FRACTURE SURGERY        Family History   Problem Relation Age of Onset    Heart attack Father     Dementia Mother     Substance Abuse Sister         cigarettes    Substance Abuse Brother     Alcohol abuse Brother       Social History     Tobacco Use    Smoking status: Every Day     Current packs/day: 1.00     Average packs/day: 1 pack/day for 45.0 years (45.0 ttl pk-yrs)     Types: Cigarettes    Smokeless tobacco: Never   Vaping Use    Vaping status: Never Used   Substance Use Topics    Alcohol use: No     Comment: hx. of hard liquor, regular use, quit at age 27.    Drug use: No      E-Cigarette/Vaping    E-Cigarette Use Never User       E-Cigarette/Vaping Substances    Nicotine No     THC No     CBD No     Flavoring No     Other No     Unknown No       I have reviewed and agree with the history as documented.     69-year-old male, presents for evaluation of " wound on left knee.  Patient states he has chronic wound to left knee, was previously using some kind of ointment on it.  Patient states he picks at it frequently.  Denies any swelling any, no difficulty walking.  Denies any fevers.      History provided by:  Patient   used: No    Knee Pain  Associated symptoms: no fever        Review of Systems   Constitutional: Negative.  Negative for fever.           Objective       ED Triage Vitals   Temperature Pulse Blood Pressure Respirations SpO2 Patient Position - Orthostatic VS   12/01/24 1039 12/01/24 1039 12/01/24 1041 12/01/24 1039 12/01/24 1041 12/01/24 1039   (!) 97.4 °F (36.3 °C) 63 120/72 20 98 % Sitting      Temp Source Heart Rate Source BP Location FiO2 (%) Pain Score    12/01/24 1039 12/01/24 1039 12/01/24 1039 -- 12/01/24 1039    Temporal Monitor Left arm  4      Vitals      Date and Time Temp Pulse SpO2 Resp BP Pain Score FACES Pain Rating User   12/01/24 1041 -- -- 98 % -- 120/72 -- -- MC   12/01/24 1039 97.4 °F (36.3 °C) 63 -- 20 -- 4 --             Physical Exam  Vitals and nursing note reviewed.   Constitutional:       General: He is not in acute distress.  HENT:      Head: Normocephalic and atraumatic.   Cardiovascular:      Rate and Rhythm: Normal rate.   Pulmonary:      Effort: Pulmonary effort is normal.   Musculoskeletal:         General: No swelling. Normal range of motion.      Comments: Left knee with no swelling, no tenderness, full range of motion.   Skin:     General: Skin is warm and dry.      Comments: Chronic superficial healing wound over left patella.  No drainage or bleeding, no surrounding erythema.   Neurological:      General: No focal deficit present.      Mental Status: He is alert and oriented to person, place, and time.         Results Reviewed       None            No orders to display       Procedures    ED Medication and Procedure Management   Prior to Admission Medications   Prescriptions Last Dose Informant  Patient Reported? Taking?   Diclofenac Sodium (VOLTAREN) 1 %   Yes No   Sig: Apply 2 g topically 3 (three) times a day   Menthol-Methyl Salicylate (JOSE ANGEL GUZMÁN GREASELESS) 10-15 % greaseless cream   No No   Sig: Apply topically 4 (four) times a day as needed for muscle/joint pain   Multiple Vitamins-Minerals (MULTIVITAMIN WITH MINERALS) tablet  Outside Facility (Specify) Yes No   Sig: Take 1 tablet by mouth daily   Naloxone HCl (NARCAN NA)  Outside Facility (Specify) Yes No   Sig: into each nostril Administer nasally when suspected overdose   Patient not taking: Reported on 10/18/2024   Omega-3 Fatty Acids (fish oil) 1,000 mg   Yes No   Sig: Take 1,000 mg by mouth daily   acetaminophen (TYLENOL) 500 mg tablet  Outside Facility (Specify) No No   Sig: Take 1 tablet (500 mg total) by mouth every 8 (eight) hours as needed for mild pain or headaches   albuterol (PROVENTIL HFA,VENTOLIN HFA) 90 mcg/act inhaler   No No   Sig: Inhale 2 puffs every 6 (six) hours as needed for wheezing   aspirin 81 mg chewable tablet   Yes No   Sig: Chew 81 mg daily   b complex vitamins capsule   No No   Sig: Take 1 capsule by mouth daily   benztropine (COGENTIN) 1 mg tablet  Outside Facility (Specify) Yes No   Si twice daily and 1 as needed for EPS   buPROPion (WELLBUTRIN XL) 300 mg 24 hr tablet  Outside Facility (Specify) Yes No   Sig: Take 300 mg by mouth daily   folic acid (FOLVITE) 1 mg tablet  Outside Facility (Specify) No No   Sig: Take 1 tablet (1 mg total) by mouth daily   hydrocortisone 2.5 % cream   No No   Sig: Apply topically 3 (three) times a day for 7 days   lurasidone (LATUDA) 120 mg tablet  Outside Facility (Specify) Yes No   Sig: Take by mouth 1 daily after dinner   meclizine (ANTIVERT) 12.5 MG tablet   Yes No   Si bid as needed for nausea   meclizine (ANTIVERT) 12.5 MG tablet   No No   Sig: Take 1 tablet (12.5 mg total) by mouth 3 (three) times a day as needed for dizziness   Patient not taking: Reported on 2024    meloxicam (MOBIC) 15 mg tablet   No No   Sig: Take 1 tablet (15 mg total) by mouth in the morning   metFORMIN (GLUCOPHAGE) 500 mg tablet  Outside Facility (Specify) Yes No   Sig: Take 500 mg by mouth 2 (two) times a day with meals   omeprazole (PriLOSEC) 40 MG capsule   No No   Sig: Take 1 capsule (40 mg total) by mouth daily   paliperidone (INVEGA) 9 MG 24 hr tablet   Yes No   Sig: Take 9 mg by mouth every morning   polyethylene glycol (MIRALAX) 17 g packet   No No   Sig: Take 17 g by mouth daily   pravastatin (PRAVACHOL) 40 mg tablet  Outside Facility (Specify) No No   Sig: Take 1 tablet (40 mg total) by mouth daily at bedtime   sertraline (ZOLOFT) 100 mg tablet  Outside Facility (Specify) Yes No   Si daily   tamsulosin (FLOMAX) 0.4 mg   No No   Sig: Take 1 capsule (0.4 mg total) by mouth daily with dinner   Patient taking differently: 1 tab with dinner and 1 tab at bedtime      Facility-Administered Medications: None     Discharge Medication List as of 2024 11:08 AM        START taking these medications    Details   mupirocin (BACTROBAN) 2 % ointment Apply topically 3 (three) times a day, Starting Sun 2024, Normal           CONTINUE these medications which have NOT CHANGED    Details   acetaminophen (TYLENOL) 500 mg tablet Take 1 tablet (500 mg total) by mouth every 8 (eight) hours as needed for mild pain or headaches, Starting u 2021, Normal      albuterol (PROVENTIL HFA,VENTOLIN HFA) 90 mcg/act inhaler Inhale 2 puffs every 6 (six) hours as needed for wheezing, Starting u 2024, No Print      aspirin 81 mg chewable tablet Chew 81 mg daily, Historical Med      b complex vitamins capsule Take 1 capsule by mouth daily, Starting 2024, Normal      benztropine (COGENTIN) 1 mg tablet 1 twice daily and 1 as needed for EPS, Starting 2022, Historical Med      buPROPion (WELLBUTRIN XL) 300 mg 24 hr tablet Take 300 mg by mouth daily, Historical Med      Diclofenac Sodium  (VOLTAREN) 1 % Apply 2 g topically 3 (three) times a day, Historical Med      folic acid (FOLVITE) 1 mg tablet Take 1 tablet (1 mg total) by mouth daily, Starting Tue 2/5/2019, Print      hydrocortisone 2.5 % cream Apply topically 3 (three) times a day for 7 days, Starting Thu 11/14/2024, Until Thu 11/21/2024, Normal      lurasidone (LATUDA) 120 mg tablet Take by mouth 1 daily after dinner, Historical Med      !! meclizine (ANTIVERT) 12.5 MG tablet 1 bid as needed for nausea, Historical Med      !! meclizine (ANTIVERT) 12.5 MG tablet Take 1 tablet (12.5 mg total) by mouth 3 (three) times a day as needed for dizziness, Starting Mon 9/23/2024, Normal      meloxicam (MOBIC) 15 mg tablet Take 1 tablet (15 mg total) by mouth in the morning, Starting Mon 9/23/2024, Normal      Menthol-Methyl Salicylate (JOSE ANGEL GUZMÁN GREASELESS) 10-15 % greaseless cream Apply topically 4 (four) times a day as needed for muscle/joint pain, Starting Wed 8/2/2023, Normal      metFORMIN (GLUCOPHAGE) 500 mg tablet Take 500 mg by mouth 2 (two) times a day with meals, Historical Med      Multiple Vitamins-Minerals (MULTIVITAMIN WITH MINERALS) tablet Take 1 tablet by mouth daily, Historical Med      Naloxone HCl (NARCAN NA) into each nostril Administer nasally when suspected overdose, Historical Med      Omega-3 Fatty Acids (fish oil) 1,000 mg Take 1,000 mg by mouth daily, Historical Med      omeprazole (PriLOSEC) 40 MG capsule Take 1 capsule (40 mg total) by mouth daily, Starting Wed 3/29/2023, Normal      paliperidone (INVEGA) 9 MG 24 hr tablet Take 9 mg by mouth every morning, Starting Thu 10/3/2024, Historical Med      polyethylene glycol (MIRALAX) 17 g packet Take 17 g by mouth daily, Starting Mon 9/23/2024, Until Fri 11/22/2024, Normal      pravastatin (PRAVACHOL) 40 mg tablet Take 1 tablet (40 mg total) by mouth daily at bedtime, Starting Mon 10/17/2022, Normal      sertraline (ZOLOFT) 100 mg tablet 1 daily, Historical Med      tamsulosin  (FLOMAX) 0.4 mg Take 1 capsule (0.4 mg total) by mouth daily with dinner, Starting Mon 9/23/2024, Normal       !! - Potential duplicate medications found. Please discuss with provider.        No discharge procedures on file.  ED SEPSIS DOCUMENTATION   Time reflects when diagnosis was documented in both MDM as applicable and the Disposition within this note       Time User Action Codes Description Comment    12/1/2024 11:07 AM Domo Armstrong Add [T14.8XXA] Wound of skin                  Domo Armstrong MD  12/01/24 1114

## 2024-12-05 ENCOUNTER — TELEPHONE (OUTPATIENT)
Age: 69
End: 2024-12-05

## 2024-12-05 ENCOUNTER — OFFICE VISIT (OUTPATIENT)
Dept: FAMILY MEDICINE CLINIC | Facility: HOSPITAL | Age: 69
End: 2024-12-05
Payer: MEDICARE

## 2024-12-05 VITALS
HEART RATE: 85 BPM | HEIGHT: 72 IN | RESPIRATION RATE: 16 BRPM | BODY MASS INDEX: 22.62 KG/M2 | WEIGHT: 167 LBS | OXYGEN SATURATION: 97 % | SYSTOLIC BLOOD PRESSURE: 116 MMHG | TEMPERATURE: 97.3 F | DIASTOLIC BLOOD PRESSURE: 76 MMHG

## 2024-12-05 DIAGNOSIS — E11.9 TYPE 2 DIABETES MELLITUS WITHOUT COMPLICATION, WITHOUT LONG-TERM CURRENT USE OF INSULIN (HCC): ICD-10-CM

## 2024-12-05 DIAGNOSIS — S81.002A OPEN KNEE WOUND, LEFT, INITIAL ENCOUNTER: Primary | ICD-10-CM

## 2024-12-05 DIAGNOSIS — R91.1 LUNG NODULE: ICD-10-CM

## 2024-12-05 DIAGNOSIS — Z12.11 ENCOUNTER FOR SCREENING FOR MALIGNANT NEOPLASM OF COLON: ICD-10-CM

## 2024-12-05 PROBLEM — R21 RASH: Status: RESOLVED | Noted: 2024-11-14 | Resolved: 2024-12-05

## 2024-12-05 PROCEDURE — G2211 COMPLEX E/M VISIT ADD ON: HCPCS | Performed by: INTERNAL MEDICINE

## 2024-12-05 PROCEDURE — 99213 OFFICE O/P EST LOW 20 MIN: CPT | Performed by: INTERNAL MEDICINE

## 2024-12-05 RX ORDER — MUPIROCIN 20 MG/G
OINTMENT TOPICAL 2 TIMES DAILY
Qty: 15 G | Refills: 0 | Status: SHIPPED | OUTPATIENT
Start: 2024-12-05 | End: 2024-12-12

## 2024-12-05 NOTE — ASSESSMENT & PLAN NOTE
Patient was in the ER on December 1 for chronic small wounds on the left knee.  Patient denies left knee pain, denies drainage from the wounds, denies fever.    He is in no distress on room air today, he is afebrile.  Pt has no evidence of cellulitis, abscess, septic arthritis of the left knee.  There was no expressible secretion from the wounds.  Continue bactroban for 7 days and cover knee with band aid  There's no indication for po abx or I+D    Will reassess him in 2 months        Orders:    mupirocin (BACTROBAN) 2 % ointment; Apply topically 2 (two) times a day for 7 days

## 2024-12-05 NOTE — TELEPHONE ENCOUNTER
[facility] calling to request a fax for the CT scan order so they can schedule his visit  Please fax to [phone]

## 2024-12-05 NOTE — ASSESSMENT & PLAN NOTE
Lab Results   Component Value Date    HGBA1C 6.3 07/12/2024       Patient has type 2 diabetes mellitus.  He denies diarrhea on metformin.  He is due for diabetic blood tests in January.  I ordered them.  Continue metformin    Orders:    Albumin / creatinine urine ratio; Future    Comprehensive metabolic panel; Future    Hemoglobin A1C; Future

## 2024-12-05 NOTE — ASSESSMENT & PLAN NOTE
CT of the lung in November last year showed multiple sub-6 mm lung nodules.  Patient denies shortness of breath, wheezing, chest pain, hemoptysis.  He continues to smoke and is precontemplative about smoking cessation.    He told me that he will try to cut down on smoking.    His lungs were clear to auscultation today without wheezing, crackles.    I ordered repeat CT of the lung without contrast to reassess the lung nodules.    Orders:    CT chest wo contrast; Future

## 2024-12-05 NOTE — PROGRESS NOTES
Name: Bryce Silver      : 1955      MRN: [id number]  Encounter Provider: Jessi Cloud MD  Encounter Date: 2024   Encounter department: [facility] [address]  :  Assessment & Plan  Open knee wound, left, initial encounter  Patient was in the ER on  for chronic small wounds on the left knee.  Patient denies left knee pain, denies drainage from the wounds, denies fever.    He is in no distress on room air today, he is afebrile.  Pt has no evidence of cellulitis, abscess, septic arthritis of the left knee.  There was no expressible secretion from the wounds.  Continue bactroban for 7 days and cover knee with band aid  There's no indication for po abx or I+D    Will reassess him in 2 months        Orders:    mupirocin (BACTROBAN) 2 % ointment; Apply topically 2 (two) times a day for 7 days    Type 2 diabetes mellitus without complication, without long-term current use of insulin ([facility])    Lab Results   Component Value Date    HGBA1C 6.3 2024       Patient has type 2 diabetes mellitus.  He denies diarrhea on metformin.  He is due for diabetic blood tests in January.  I ordered them.  Continue metformin    Orders:    Albumin / creatinine urine ratio; Future    Comprehensive metabolic panel; Future    Hemoglobin A1C; Future    Lung nodule  CT of the lung in November last year showed multiple sub-6 mm lung nodules.  Patient denies shortness of breath, wheezing, chest pain, hemoptysis.  He continues to smoke and is precontemplative about smoking cessation.    He told me that he will try to cut down on smoking.    His lungs were clear to auscultation today without wheezing, crackles.    I ordered repeat CT of the lung without contrast to reassess the lung nodules.    Orders:    CT chest wo contrast; Future    Encounter for screening for malignant neoplasm of colon  I asked him to [facility] for colon cancer screening.  He told me he would comply with the  "test.  Orders:    Cologuard           History of Present Illness     HPI  Review of Systems       Objective   /76   Pulse 85   Temp (!) 97.3 °F (36.3 °C) (Tympanic)   Resp 16   Ht 6' 0.05\" (1.83 m)   Wt 75.8 kg (167 lb)   SpO2 97%   BMI 22.62 kg/m²      Physical Exam  Constitutional:       General: He is not in acute distress.     Appearance: He is not toxic-appearing.   HENT:      Head: Normocephalic.   Cardiovascular:      Rate and Rhythm: Normal rate and regular rhythm.   Pulmonary:      Effort: No respiratory distress.      Breath sounds: No wheezing or rales.   Skin:     Findings: Lesion present.   Neurological:      Mental Status: He is alert.         "

## 2024-12-07 PROBLEM — H61.23 BILATERAL IMPACTED CERUMEN: Status: RESOLVED | Noted: 2024-11-07 | Resolved: 2024-12-07

## 2024-12-11 LAB — COLOGUARD RESULT REPORTABLE: NEGATIVE

## 2024-12-12 ENCOUNTER — HOSPITAL ENCOUNTER (OUTPATIENT)
Dept: CT IMAGING | Facility: HOSPITAL | Age: 69
Discharge: HOME | End: 2024-12-12
Payer: MEDICARE

## 2024-12-12 ENCOUNTER — RESULTS FOLLOW-UP (OUTPATIENT)
Dept: GASTROENTEROLOGY | Facility: CLINIC | Age: 69
End: 2024-12-12

## 2024-12-12 DIAGNOSIS — R91.1 LUNG NODULE: ICD-10-CM

## 2024-12-12 PROCEDURE — 71250 CT THORAX DX C-: CPT

## 2024-12-18 ENCOUNTER — RESULTS FOLLOW-UP (OUTPATIENT)
Dept: FAMILY MEDICINE CLINIC | Facility: HOSPITAL | Age: 69
End: 2024-12-18

## 2025-01-31 ENCOUNTER — OFFICE VISIT (OUTPATIENT)
Dept: FAMILY MEDICINE CLINIC | Facility: HOSPITAL | Age: 70
End: 2025-01-31
Payer: MEDICARE

## 2025-01-31 VITALS
OXYGEN SATURATION: 96 % | SYSTOLIC BLOOD PRESSURE: 114 MMHG | DIASTOLIC BLOOD PRESSURE: 78 MMHG | BODY MASS INDEX: 23.7 KG/M2 | HEART RATE: 82 BPM | WEIGHT: 175 LBS | TEMPERATURE: 97.7 F

## 2025-01-31 DIAGNOSIS — F20.9 SCHIZOPHRENIA, UNSPECIFIED TYPE (HCC): ICD-10-CM

## 2025-01-31 DIAGNOSIS — E11.9 TYPE 2 DIABETES MELLITUS WITHOUT COMPLICATION, WITHOUT LONG-TERM CURRENT USE OF INSULIN (HCC): ICD-10-CM

## 2025-01-31 DIAGNOSIS — F17.200 SMOKER: ICD-10-CM

## 2025-01-31 DIAGNOSIS — F19.11 HISTORY OF DRUG ABUSE IN REMISSION (HCC): ICD-10-CM

## 2025-01-31 DIAGNOSIS — J04.0 LARYNGITIS: Primary | ICD-10-CM

## 2025-01-31 DIAGNOSIS — J42 CHRONIC BRONCHITIS, UNSPECIFIED CHRONIC BRONCHITIS TYPE (HCC): ICD-10-CM

## 2025-01-31 LAB — S PYO AG THROAT QL: NEGATIVE

## 2025-01-31 PROCEDURE — G2211 COMPLEX E/M VISIT ADD ON: HCPCS | Performed by: NURSE PRACTITIONER

## 2025-01-31 PROCEDURE — 99214 OFFICE O/P EST MOD 30 MIN: CPT | Performed by: NURSE PRACTITIONER

## 2025-01-31 PROCEDURE — 87880 STREP A ASSAY W/OPTIC: CPT | Performed by: NURSE PRACTITIONER

## 2025-01-31 NOTE — ASSESSMENT & PLAN NOTE
Lab Results   Component Value Date    HGBA1C 6.3 07/12/2024   History of type 2 diabetes well-controlled on metformin 500 mg twice daily.  Denies any loose bowels, lightheadedness dizziness, symptoms of hypoglycemia.  Due for repeat A1c prior to next scheduled follow-up.

## 2025-01-31 NOTE — ASSESSMENT & PLAN NOTE
Continues to smoke 1 pack/day precontemplative phase.  Denies shortness of breath chest pressure pain wheezing.  Does have ongoing chronic cough.  Up-to-date with CT lung cancer screening with stable pulmonary nodules.

## 2025-01-31 NOTE — ASSESSMENT & PLAN NOTE
Hx tobacco use 1ppd.    hoarseness two days ago with occasional cough.  Denies rhinorrhea, nasal congestion, dyspnea nor sore throat.  +chills, no fever.  Appetite unchanged.  Staying hydrated.  Sleeping well at night.  -NAD, afebrile VSS.  HEENT with mild rhinorrhea, reddened oropharynx with postnasal drip.  No suspicious oral lesions.  Occasional hoarseness, no cervical adenopathy.  Lungs CTA with strong MNPC.  -Rapid strep negative in office.  Likely suffering from viral laryngitis which will need to run its course.  Recommended pushing fluids, smoking cessation, resting voice with use of Tylenol or throat lozenge if any pain or sore throat.

## 2025-01-31 NOTE — PROGRESS NOTES
[facility]   Rachell ENGLE    Assessment/Plan:   1. Laryngitis  Assessment & Plan:  Hx tobacco use 1ppd.    hoarseness two days ago with occasional cough.  Denies rhinorrhea, nasal congestion, dyspnea nor sore throat.  +chills, no fever.  Appetite unchanged.  Staying hydrated.  Sleeping well at night.  -NAD, afebrile VSS.  HEENT with mild rhinorrhea, reddened oropharynx with postnasal drip.  No suspicious oral lesions.  Occasional hoarseness, no cervical adenopathy.  Lungs CTA with strong MNPC.  -Rapid strep negative in office.  Likely suffering from viral laryngitis which will need to run its course.  Recommended pushing fluids, smoking cessation, resting voice with use of Tylenol or throat lozenge if any pain or sore throat.  Orders:  -     POCT rapid ANTIGEN strepA  -     menthol-cetylpyridinium (CEPACOL) 3 MG lozenge; Take 1 lozenge (3 mg total) by mouth as needed for sore throat  2. Schizophrenia, unspecified type ([facility])  Assessment & Plan:  History of schizophrenia.  Stable mood on exam, reports auditory as well as visual hallucinations that do not concern him at this time.  Medication adherence to Latuda, Cogentin, Wellbutrin as well as Invega.  Followed by psychiatry.  3. Smoker  Assessment & Plan:  Continues to smoke 1 pack/day precontemplative phase.  Denies shortness of breath chest pressure pain wheezing.  Does have ongoing chronic cough.  Up-to-date with CT lung cancer screening with stable pulmonary nodules.  4. History of drug abuse in remission ([facility])  Assessment & Plan:  History of remote drug abuse including cocaine with sustained abstinence for years  5. Chronic bronchitis, unspecified chronic bronchitis type ([facility])  Assessment & Plan:  History of COPD with continued tobacco use.  Denies dyspnea, wheeze, does have moist nonproductive cough on exam.  Unclear how often he uses his as needed albuterol inhaler due to psychiatric illness/poor historian.  Lungs CTA on exam.  UTD CT  lung screening  6. Type 2 diabetes mellitus without complication, without long-term current use of insulin ([facility])  Assessment & Plan:    Lab Results   Component Value Date    HGBA1C 6.3 07/12/2024   History of type 2 diabetes well-controlled on metformin 500 mg twice daily.  Denies any loose bowels, lightheadedness dizziness, symptoms of hypoglycemia.  Due for repeat A1c prior to next scheduled follow-up.      No follow-ups on file.  Patient may call or return to office with any questions or concerns.     ______________________________________________________________________  Subjective:     Patient ID: Bryce Silver is a 69 y.o. male.  Bryce Silver  Chief Complaint   Patient presents with    Laryngitis    Sore Throat     Pt reports losing his voice 2 days ago.      HPI           The following portions of the patient's history were reviewed and updated as appropriate: allergies, current medications, past family history, past medical history, past social history, past surgical history, and problem list.    Review of Systems   Constitutional: Negative.  Negative for activity change, appetite change, chills, fatigue and fever.   HENT:  Positive for voice change. Negative for congestion, ear pain, postnasal drip, rhinorrhea, sinus pain and sore throat.    Eyes: Negative.    Respiratory:  Positive for cough. Negative for shortness of breath.    Cardiovascular: Negative.  Negative for chest pain and leg swelling.   Gastrointestinal: Negative.  Negative for constipation and diarrhea.   Endocrine: Negative.    Genitourinary: Negative.  Negative for dysuria.   Musculoskeletal: Negative.    Skin: Negative.    Allergic/Immunologic: Negative.  Negative for immunocompromised state.   Neurological: Negative.  Negative for dizziness and light-headedness.   Hematological: Negative.    Psychiatric/Behavioral:  Positive for hallucinations. Negative for sleep disturbance.          Objective:      Vitals:    01/31/25 1345   BP:  114/78   Pulse: 82   Temp: 97.7 °F (36.5 °C)   SpO2: 96%      Physical Exam  Vitals and nursing note reviewed.   Constitutional:       General: He is awake. He is not in acute distress.     Appearance: Normal appearance. He is not ill-appearing.   HENT:      Head: Normocephalic and atraumatic.      Right Ear: Tympanic membrane, ear canal and external ear normal.      Left Ear: Tympanic membrane, ear canal and external ear normal.      Nose: Rhinorrhea present. Rhinorrhea is clear.      Right Sinus: No maxillary sinus tenderness or frontal sinus tenderness.      Left Sinus: No maxillary sinus tenderness or frontal sinus tenderness.      Mouth/Throat:      Mouth: Mucous membranes are moist.      Dentition: Abnormal dentition.      Pharynx: Oropharynx is clear. Posterior oropharyngeal erythema and postnasal drip present. No oropharyngeal exudate.      Comments: Hoarseness  Mucous back of throat  Eyes:      Extraocular Movements: Extraocular movements intact.      Conjunctiva/sclera: Conjunctivae normal.      Pupils: Pupils are equal, round, and reactive to light.   Cardiovascular:      Rate and Rhythm: Normal rate and regular rhythm.      Pulses: Normal pulses.      Heart sounds: Normal heart sounds.   Pulmonary:      Effort: Pulmonary effort is normal. No respiratory distress.      Breath sounds: No wheezing, rhonchi or rales.      Comments: [facility] [place]    Chest:      Chest wall: No tenderness.   Abdominal:      General: Bowel sounds are normal.      Palpations: Abdomen is soft.   Musculoskeletal:         General: Normal range of motion.      Cervical back: Normal range of motion and neck supple.      Right lower leg: No edema.      Left lower leg: No edema.   Lymphadenopathy:      Cervical: No cervical adenopathy.   Skin:     General: Skin is warm and dry.   Neurological:      General: No focal deficit present.      Mental Status: He is alert and oriented to person, place, and time.   Psychiatric:         Attention and  "Perception: He perceives auditory and visual hallucinations.         Mood and Affect: Mood normal.         Behavior: Behavior is slowed. Behavior is cooperative.         Thought Content: Thought content normal.         Judgment: Judgment normal.      Comments: Reports chronic hallucinations which are not bothersome to him            Portions of the record may have been created with voice recognition software. Occasional wrong word or \"sound alike\" substitutions may have occurred due to the inherent limitations of voice recognition software. Please review the chart carefully and recognize, using context, where substitutions/typographical errors may have occurred.       "

## 2025-01-31 NOTE — PATIENT INSTRUCTIONS
Likely viral laryngitis - voice rest, throat lozenges if sore throat.  Use of tylenol as needed. Push fluids.

## 2025-01-31 NOTE — ASSESSMENT & PLAN NOTE
History of schizophrenia.  Stable mood on exam, reports auditory as well as visual hallucinations that do not concern him at this time.  Medication adherence to Latuda, Cogentin, Wellbutrin as well as Invega.  Followed by psychiatry.

## 2025-01-31 NOTE — ASSESSMENT & PLAN NOTE
History of COPD with continued tobacco use.  Denies dyspnea, wheeze, does have moist nonproductive cough on exam.  Unclear how often he uses his as needed albuterol inhaler due to psychiatric illness/poor historian.  Lungs CTA on exam.  UTD CT lung screening

## 2025-02-11 LAB
EXTERNAL CREATININE: 0.9
EXTERNAL EGFR: 93
HBA1C MFR BLD HPLC: 6.3 %

## 2025-02-12 ENCOUNTER — RESULTS FOLLOW-UP (OUTPATIENT)
Dept: FAMILY MEDICINE CLINIC | Facility: HOSPITAL | Age: 70
End: 2025-02-12

## 2025-02-12 NOTE — TELEPHONE ENCOUNTER
----- Message from Jessi Cloud MD sent at 2/12/2025  3:45 PM EST -----  Labs are stable, continue same meds. We will review everything in the office

## 2025-02-13 ENCOUNTER — RA CDI HCC (OUTPATIENT)
Dept: OTHER | Facility: HOSPITAL | Age: 70
End: 2025-02-13

## 2025-02-18 ENCOUNTER — OFFICE VISIT (OUTPATIENT)
Dept: FAMILY MEDICINE CLINIC | Facility: HOSPITAL | Age: 70
End: 2025-02-18
Payer: MEDICARE

## 2025-02-18 VITALS
WEIGHT: 166.8 LBS | OXYGEN SATURATION: 99 % | HEART RATE: 82 BPM | HEIGHT: 72 IN | DIASTOLIC BLOOD PRESSURE: 84 MMHG | BODY MASS INDEX: 22.59 KG/M2 | TEMPERATURE: 97.9 F | SYSTOLIC BLOOD PRESSURE: 116 MMHG

## 2025-02-18 DIAGNOSIS — J04.0 LARYNGITIS: Primary | ICD-10-CM

## 2025-02-18 PROCEDURE — G2211 COMPLEX E/M VISIT ADD ON: HCPCS

## 2025-02-18 PROCEDURE — 99213 OFFICE O/P EST LOW 20 MIN: CPT

## 2025-02-18 RX ORDER — CEFDINIR 300 MG/1
300 CAPSULE ORAL EVERY 12 HOURS SCHEDULED
Qty: 14 CAPSULE | Refills: 0 | Status: SHIPPED | OUTPATIENT
Start: 2025-02-18 | End: 2025-02-25

## 2025-02-18 NOTE — ASSESSMENT & PLAN NOTE
-Will prescribe cefdinir x7 days, pharmacy confirmed, no abx allergies documented  -C/w current PPI and inhaler  F/u for next visit with PCP    Orders:    cefdinir (OMNICEF) 300 mg capsule; Take 1 capsule (300 mg total) by mouth every 12 (twelve) hours for 7 days

## 2025-02-18 NOTE — PROGRESS NOTES
"Name: Bryce Silver      : 1955      MRN: [id number]  Encounter Provider: Otilia Vincent DO  Encounter Date: 2025   Encounter department: [facility] CARE [address]  :  Assessment & Plan  Laryngitis  -Will prescribe cefdinir x7 days, pharmacy confirmed, no abx allergies documented  -C/w current PPI and inhaler  F/u for next visit with PCP    Orders:    cefdinir (OMNICEF) 300 mg capsule; Take 1 capsule (300 mg total) by mouth every 12 (twelve) hours for 7 days           History of Present Illness   F/u lost voice, presumed laryngitis at last visit 25 with Maria E, strep negative at that time  Fever:denies  Appetite: normal  Sick contacts: denies  Mnaaging with Tylenol, keeping hydrated      Review of Systems   Constitutional:  Negative for appetite change, chills and fever.   HENT:  Positive for trouble swallowing (EGD , normal findings other than esophagitis) and voice change. Negative for sore throat.    Respiratory:  Positive for shortness of breath (uses inhaler, denies needing refill).        Objective   /84   Pulse 82   Temp 97.9 °F (36.6 °C) (Tympanic)   Ht 6' 0.05\" (1.83 m)   Wt 75.7 kg (166 lb 12.8 oz)   SpO2 99%   BMI 22.59 kg/m²      Physical Exam  Constitutional:       General: He is not in acute distress.     Appearance: Normal appearance. He is not ill-appearing.   HENT:      Head: Normocephalic and atraumatic.      Right Ear: There is impacted cerumen.      Left Ear: There is impacted cerumen.      Mouth/Throat:      Mouth: Mucous membranes are moist.      Pharynx: Posterior oropharyngeal erythema present.      Comments: Poor dentition  Cardiovascular:      Rate and Rhythm: Normal rate and regular rhythm.      Heart sounds: Normal heart sounds.   Pulmonary:      Effort: Pulmonary effort is normal.      Breath sounds: Normal breath sounds.   Neurological:      Mental Status: He is alert.   Psychiatric:         Mood and Affect: Mood normal.    "      Behavior: Behavior normal.           Otilia Vincent, DO  Family Medicine

## 2025-03-31 ENCOUNTER — OFFICE VISIT (OUTPATIENT)
Dept: FAMILY MEDICINE CLINIC | Facility: HOSPITAL | Age: 70
End: 2025-03-31
Payer: MEDICARE

## 2025-03-31 VITALS
WEIGHT: 169.2 LBS | BODY MASS INDEX: 22.92 KG/M2 | DIASTOLIC BLOOD PRESSURE: 68 MMHG | HEIGHT: 72 IN | OXYGEN SATURATION: 96 % | HEART RATE: 88 BPM | SYSTOLIC BLOOD PRESSURE: 118 MMHG

## 2025-03-31 DIAGNOSIS — K13.70 ORAL LESION: Primary | ICD-10-CM

## 2025-03-31 PROCEDURE — G2211 COMPLEX E/M VISIT ADD ON: HCPCS

## 2025-03-31 PROCEDURE — 99213 OFFICE O/P EST LOW 20 MIN: CPT

## 2025-03-31 RX ORDER — SULFAMETHOXAZOLE AND TRIMETHOPRIM 800; 160 MG/1; MG/1
1 TABLET ORAL 2 TIMES DAILY
Qty: 14 TABLET | Refills: 0 | Status: SHIPPED | OUTPATIENT
Start: 2025-03-31 | End: 2025-03-31 | Stop reason: ALTCHOICE
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
## 2025-04-21 ENCOUNTER — OFFICE VISIT (OUTPATIENT)
Dept: GASTROENTEROLOGY | Facility: CLINIC | Age: 70
End: 2025-04-21
Payer: MEDICARE

## 2025-04-21 VITALS
HEIGHT: 72 IN | DIASTOLIC BLOOD PRESSURE: 80 MMHG | WEIGHT: 173 LBS | BODY MASS INDEX: 23.43 KG/M2 | SYSTOLIC BLOOD PRESSURE: 120 MMHG

## 2025-04-21 DIAGNOSIS — K59.00 CONSTIPATION, UNSPECIFIED CONSTIPATION TYPE: Primary | ICD-10-CM

## 2025-04-21 DIAGNOSIS — Z12.11 SCREENING FOR COLON CANCER: ICD-10-CM

## 2025-04-21 DIAGNOSIS — K21.9 GASTROESOPHAGEAL REFLUX DISEASE, UNSPECIFIED WHETHER ESOPHAGITIS PRESENT: ICD-10-CM

## 2025-04-21 PROCEDURE — G2211 COMPLEX E/M VISIT ADD ON: HCPCS | Performed by: INTERNAL MEDICINE

## 2025-04-21 PROCEDURE — 99214 OFFICE O/P EST MOD 30 MIN: CPT | Performed by: INTERNAL MEDICINE

## 2025-04-21 NOTE — PROGRESS NOTES
Name: Bryce Silver      : 1955      MRN: [id number]  Encounter Provider: Tex Recinos MD  Encounter Date: 2025   Encounter department: [facility]      Assessment & Plan  1. Constipation:  - Continue MiraLAX 17 g daily as needed.    2. GERD: History of hiatal hernia and grade B esophagitis.  - Continue omeprazole 40 mg daily.  - Encourage tobacco cessation.    3. Tobacco Use Disorder:  - Encourage tobacco cessation.    4. Health Maintenance:  - Reorder Cologuard test.  - Consider colonoscopy if Cologuard positive.  - History of recurrent poor bowel preps in the past.  Likely is having difficulty following with instructions for bowel preps.  Would need extended bowel prep along with MiraLAX titration 2 weeks before if needing colonoscopy.    Follow-up:  - 2026    Results  - EGD (2023):    - 5 cm hiatal hernia    - Grade B esophagitis  - Colonoscopy (2023):    - Poor prep     Problem List Items Addressed This Visit    None  Visit Diagnoses         Constipation, unspecified constipation type    -  Primary      Screening for colon cancer          Gastroesophageal reflux disease, unspecified whether esophagitis present                 Chief Complaint   Patient presents with    Follow-up     Pt doing ok       History of Present Illness  The patient is a 69-year-old male presenting for a follow-up visit.    Constipation  - Last evaluated in 2024 for constipation  - Initiated on polyethylene glycol 3350 (MiraLAX) at a dosage of 17 grams daily  - Reports achieving daily bowel movements  - Most recent episodes characterized by diarrhea  - Experienced emesis following the ingestion of chicken bones  - Continues to use MiraLAX    Esophagitis  - History of grade B esophagitis  - Currently managed with omeprazole 40 mg daily  - Reports symptoms of pyrosis  - Uses Rolaids, efficacy uncertain  - Denies any dysphagia    Colorectal Cancer Screening  - Inadequate bowel  preparation for colonoscopies conducted in 2020, 2022, and 2023  - Cologuard testing recommended but not completed  - Unclear whether a stool sample was sent for analysis  - Last esophagogastroduodenoscopy (EGD) and colonoscopy performed in March 2023  - EGD revealed a 5 cm hiatal hernia with grade B esophagitis  - Colonoscopy noted for poor bowel preparation    Schizophrenia  - Documented history of schizophrenia    Tobacco Use Disorder  - Currently attempting to reduce smoking    Supplemental information: None    SOCIAL HISTORY  - Smokes tobacco       Historical Information   Past Medical History:   Diagnosis Date    Atopic rhinitis     BPH with urinary obstruction     Cataracts, bilateral     Cholelithiasis     Chronic post-traumatic headache     Colon polyps     Constipation     COPD (chronic obstructive pulmonary disease) (HCC)     Diabetes mellitus (HCC)     Esophagitis     Glenohumeral arthritis, right     Hyperlipidemia     Incomplete bladder emptying     Nocturia     Refractive error     B/L    Retinopathy, bilateral     Schizophrenia (HCC)     Tinnitus     Umbilical hernia without obstruction and without gangrene     Unspecified rotator cuff tear or rupture of right shoulder, not specified as traumatic     Urinary retention      Past Surgical History:   Procedure Laterality Date    NV RPR AA HERNIA 1ST < 3 CM REDUCIBLE N/A 7/16/2024    Procedure: REPAIR HERNIA UMBILICAL;  Surgeon: Quang Lorenzo MD;  Location:  MAIN OR;  Service: General    RIB FRACTURE SURGERY       Social History     Substance and Sexual Activity   Alcohol Use No    Comment: hx. of hard liquor, regular use, quit at age 27.     Social History     Substance and Sexual Activity   Drug Use No     Social History     Tobacco Use   Smoking Status Every Day    Current packs/day: 1.00    Average packs/day: 1 pack/day for 45.0 years (45.0 ttl pk-yrs)    Types: Cigarettes   Smokeless Tobacco Never     Family History   Problem Relation Age of  Onset    Heart attack Father     Dementia Mother     Substance Abuse Sister         cigarettes    Substance Abuse Brother     Alcohol abuse Brother        Meds/Allergies     Current Outpatient Medications:     acetaminophen (TYLENOL) 500 mg tablet    albuterol (PROVENTIL HFA,VENTOLIN HFA) 90 mcg/act inhaler    aspirin 81 mg chewable tablet    b complex vitamins capsule    benzocaine (ORAJEL) 10 % mucosal gel    benztropine (COGENTIN) 1 mg tablet    buPROPion (WELLBUTRIN XL) 300 mg 24 hr tablet    Diclofenac Sodium (VOLTAREN) 1 %    lurasidone (LATUDA) 120 mg tablet    meclizine (ANTIVERT) 12.5 MG tablet    meclizine (ANTIVERT) 12.5 MG tablet    meloxicam (MOBIC) 15 mg tablet    menthol-cetylpyridinium (CEPACOL) 3 MG lozenge    Menthol-Methyl Salicylate (JOSE ANGEL GUZMÁN GREASELESS) 10-15 % greaseless cream    metFORMIN (GLUCOPHAGE) 500 mg tablet    Multiple Vitamins-Minerals (MULTIVITAMIN WITH MINERALS) tablet    Omega-3 Fatty Acids (fish oil) 1,000 mg    omeprazole (PriLOSEC) 40 MG capsule    paliperidone (INVEGA) 9 MG 24 hr tablet    pravastatin (PRAVACHOL) 40 mg tablet    sertraline (ZOLOFT) 100 mg tablet    tamsulosin (FLOMAX) 0.4 mg    folic acid (FOLVITE) 1 mg tablet    hydrocortisone 2.5 % cream    mupirocin (BACTROBAN) 2 % ointment    Naloxone HCl (NARCAN NA)    polyethylene glycol (MIRALAX) 17 g packet  No Known Allergies    PHYSICAL EXAM:    Blood pressure 120/80, height 6' (1.829 m), weight 78.5 kg (173 lb). Body mass index is 23.46 kg/m².  Physical Exam    General Appearance: No apparent distress, cooperative, alert.  Eyes: Anicteric.  Gastrointestinal: Soft, non-tender, non-distended; normal bowel sounds; no masses, no organomegaly.    Rectal: Deferred.  Musculoskeletal: No edema.  Skin: No jaundice.     OTHER LAB RESULTS:   Lab Results   Component Value Date    WBC 6.43 07/16/2024    WBC 7.38 02/23/2023    WBC 7.66 12/27/2022    HGB 12.6 07/16/2024    HGB 14.0 02/23/2023    HGB 14.2 12/27/2022    MCV 89  "07/16/2024     07/16/2024     02/23/2023     12/27/2022     Lab Results   Component Value Date    K 4.1 07/16/2024     07/16/2024    CO2 25 07/16/2024    BUN 11 07/16/2024    CREATININE 0.83 07/16/2024    GLUF 109 (H) 07/16/2024    CALCIUM 10.0 07/16/2024    AST 28 05/03/2023    AST 18 02/23/2023    AST 28 02/21/2023    ALT 25 05/03/2023    ALT 20 02/23/2023    ALT 31 02/21/2023    ALKPHOS 65 05/03/2023    ALKPHOS 50 02/23/2023    ALKPHOS 75 02/21/2023    ALB 4.4 05/03/2023    TBILI 0.3 05/03/2023    TBILI 0.35 02/23/2023    TBILI 0.3 02/21/2023    EGFR 90 07/16/2024     No results found for: \"IRON\", \"TIBC\", \"FERRITIN\"  Lab Results   Component Value Date    LIPASE 25 02/23/2023       OTHER RADIOLOGY RESULTS:   No results found.  "

## 2025-04-28 ENCOUNTER — OFFICE VISIT (OUTPATIENT)
Age: 70
End: 2025-04-28
Payer: MEDICARE

## 2025-04-28 VITALS
SYSTOLIC BLOOD PRESSURE: 122 MMHG | HEART RATE: 90 BPM | TEMPERATURE: 97.8 F | DIASTOLIC BLOOD PRESSURE: 64 MMHG | BODY MASS INDEX: 22.43 KG/M2 | OXYGEN SATURATION: 98 % | HEIGHT: 72 IN | WEIGHT: 165.6 LBS

## 2025-04-28 DIAGNOSIS — R26.2 AMBULATORY DYSFUNCTION: ICD-10-CM

## 2025-04-28 DIAGNOSIS — E11.9 TYPE 2 DIABETES MELLITUS WITHOUT COMPLICATION, WITHOUT LONG-TERM CURRENT USE OF INSULIN (HCC): ICD-10-CM

## 2025-04-28 DIAGNOSIS — R35.1 BENIGN PROSTATIC HYPERPLASIA WITH NOCTURIA: ICD-10-CM

## 2025-04-28 DIAGNOSIS — J42 CHRONIC BRONCHITIS, UNSPECIFIED CHRONIC BRONCHITIS TYPE (HCC): ICD-10-CM

## 2025-04-28 DIAGNOSIS — F33.0 MILD EPISODE OF RECURRENT MAJOR DEPRESSIVE DISORDER (HCC): ICD-10-CM

## 2025-04-28 DIAGNOSIS — N40.1 BENIGN PROSTATIC HYPERPLASIA WITH NOCTURIA: ICD-10-CM

## 2025-04-28 DIAGNOSIS — E78.2 MIXED HYPERLIPIDEMIA: ICD-10-CM

## 2025-04-28 DIAGNOSIS — R41.89 COGNITIVE IMPAIRMENT: Primary | ICD-10-CM

## 2025-04-28 DIAGNOSIS — F20.9 SCHIZOPHRENIA, UNSPECIFIED TYPE (HCC): ICD-10-CM

## 2025-04-28 DIAGNOSIS — H91.90 HARD OF HEARING: ICD-10-CM

## 2025-04-28 PROCEDURE — 99215 OFFICE O/P EST HI 40 MIN: CPT | Performed by: INTERNAL MEDICINE

## 2025-04-28 NOTE — PROGRESS NOTES
[facility]  Follow-up  [address] [facility] [address] [phone]    NAME: Bryce Silver  AGE: 69 y.o. SEX: male    DATE OF ENCOUNTER: 4/28/2025    Assessment and Plan     Cognitive impairment  Multifactorial including schizophrenia, depression and history of drug abuse in the past.  Patient was referred to neuropsychology last visit for further cognitive and capacity evaluation, however patient did not schedule a visit with them.    Edmar Cognitive Assessment: 13/30 (was 10/30 on 9/23/2024) with deficits in visual-spatial, executive function, draw clock, delayed recall and orientation  Geriatric Depression Screen: 10/15  Patient advised to remain remain active physically, mentally and socially  Engage in cognitively challenging exercises such as crosswords and puzzles  Maintain chronic conditions under control  Follow-up in 12 months and repeat MoCA     Ambulatory dysfunction  History of falls   TUGs 12 seconds. Does not use any assistive device.  Finished PT  Fall precautions    Mixed hyperlipidemia  Takes pravastatin, tolerated  Managed by PCP     Mild episode of recurrent major depressive disorder  GDS 10/15  Follows w/ psychiatry and lives at group home for residents w/ behavioral health issues.  Takes bupropion    Schizophrenia, unspecified type  Follows w/ psychiatry.  Takes paliperidone daily  Takes benztropine PRN for noted mouth tardive dyskinesia     Type 2 diabetes mellitus without complication, without long-term current use of insulin  Currently on metformin  Follow-up with PCP    Arthritis  Takes meloxicam  Tylenol 500 mg p.o. every 8 hours as as needed  Voltaren gel    Chronic bronchitis, unspecified chronic bronchitis type  Takes albuterol  Currently smokes 1 PPD  Educated and encouraged smoking cessation    Advanced care planning/counseling discussion  Has 5 sisters.  Pt denies having a POA nor a Living Will    Benign prostatic hyperplasia with nocturia  Takes tamsulosin.  Denies urinary incontinence.      Hard of hearing  Referral to audiology    Chief Complaint     Chief Complaint   Patient presents with    Follow-up     W/MoCA accompanied by     Patient is here today for follow up of memory issues and chronic conditions    History of Present Illness       I had the pleasure of evaluating  Bryce Silver who is a 69 y.o. male  in Geriatric follow-up today. Since our last follow-up  has had any falls (usually mechanical ie tripped on shoelace, has had any hospitalizations (had knee pain result and had topical antibiotic) and has had medication changes or major life events.      Memory issues were first noticed bypatient. They have been present since 3 year(s), and include  short term memory loss. Symptoms mainly affect short term memory loss, and have states it worse but MOCA has improved       Bryce Silver reports  does not  drive, does get lost in familiar settings (states gets lost in his backyard), and has not had recent citations or accidents (stopped driving 10 years ago). He does not manage  own affairs such as balancing  checkbook, paying bills, and managing investments and does  have any difficulties with handling these financial affairs (sister helps with finances). He does  notice changes in his own mood or personality and has been treated in the past for schizophrenia/ He does  note any hearing (wants hearing aids) or vision issues  (does wear glasses regularly) and does  report any sleep issues (has a hard time staying awake). They does not have a living will and does not have an appointed POA.     He states that he does not like where he lives since he wants to go home. He does understand that the facility really helps w/ all his meds and meal prepping and appreciates that and feels that he should be nicer them.     Family members accompanying the patient today include (none).       FUNCTIONAL STATUS:  ADLs  Does patient require assistance with:  Bathing:  No  Dressing: Yes, sometimes needs help with putting on shoes  Transferring: No  Continence: No  Toileting: No  Feeding: No     IADLs  Dose patient require assistance with:  Telephone: No  Shopping: yes  Food Preparation: yes  Housekeeping: No  Laundry: No   Transportation: Does not drive.  Medications: Lives at facility, which dispenses medications.  Finances: yes, sister handles      NEUROPSYCH SYMPTOMS:  Does patient get angry or hostile? No    Resist care from others? No  Does patient see or hear things that no one else can see or hear? yes  Does patient act impatient and cranky? No  Does mood frequently change for no apparent reason? No  Does patient act suspicious without good reason (example: believes that others are stealing from him or her, or planning to harm him or her in some way)?Yes, feels like other patients act suspicious   Does patient less interested in his or her usual activities or in the activities and plans of others? No  Does patient have trouble sleeping at night? yes  Dose patient have abnormal movements while asleep? yes     SAFETY:  Hearing and vision issue: Yes, wears glasses and hard of hearing   Any gait or balance disorder: No  Patient does use cane sometimes   Any falls in the last year: 1x/month   Any history of wandering: No  Are there firearms or guns in the home: N/A  Does patient drive: No  Any driving accidents or citations in the last year: N/A  Any concerns about patient's ability to drive: N/A     ACP REVIEW:  Does patient have POA: No  Does patient have a Living will: No  Any legal assistance needed for healthcare planning?: Yes    The following portions of the patient's history were reviewed and updated as appropriate: allergies, current medications, past family history, past medical history, past social history, past surgical history and problem list.      Review of Systems     Review of Systems   Constitutional:  Negative for chills and fever.   HENT:  Negative for ear  pain and sore throat.    Eyes:  Negative for pain and visual disturbance.   Respiratory:  Negative for cough and shortness of breath.    Cardiovascular:  Negative for chest pain and palpitations.   Gastrointestinal:  Negative for abdominal pain and vomiting.   Genitourinary:  Negative for dysuria and hematuria.   Musculoskeletal:  Negative for arthralgias and back pain.   Skin:  Negative for color change and rash.   Neurological:  Negative for seizures and syncope.   All other systems reviewed and are negative.    Objective     /64 (BP Location: Left arm, Patient Position: Sitting, Cuff Size: Standard)   Pulse 90   Temp 97.8 °F (36.6 °C) (Temporal)   Ht 6' (1.829 m)   Wt 75.1 kg (165 lb 9.6 oz)   SpO2 98%   BMI 22.46 kg/m²     Physical Exam  Vitals reviewed.   Constitutional:       Appearance: Normal appearance. He is obese.   HENT:      Head: Normocephalic.      Mouth/Throat:      Mouth: Mucous membranes are moist.   Eyes:      Conjunctiva/sclera: Conjunctivae normal.   Cardiovascular:      Rate and Rhythm: Normal rate and regular rhythm.      Heart sounds: Normal heart sounds. No murmur heard.  Pulmonary:      Effort: Pulmonary effort is normal. No respiratory distress.      Breath sounds: Normal breath sounds.   Abdominal:      General: Bowel sounds are normal.      Palpations: Abdomen is soft.      Tenderness: There is no abdominal tenderness.   Musculoskeletal:      Cervical back: Neck supple.   Skin:     General: Skin is warm and dry.   Neurological:      Mental Status: He is alert.      Cranial Nerves: Cranial nerves 2-12 are intact.      Sensory: Sensation is intact.      Motor: Motor function is intact.      Coordination: Coordination is intact.      Gait: Gait is intact.      Comments: Oriented to person   Not place thought was in hospital in [place]   Got month and year correct  Got date incorrect     Has some horizontal side to side movements consistent w/ tardive     MoCA 13/30  GDS  10/15  TUG 12 seconds    Pertinent Laboratory/Diagnostic Studies:    I personally reviewed lab results including  2/11/2025 A1c 6.3

## 2025-06-09 ENCOUNTER — TELEPHONE (OUTPATIENT)
Age: 70
End: 2025-06-09

## 2025-06-09 NOTE — TELEPHONE ENCOUNTER
Pt has current order for Omega-3 Fatty Acids (fish oil) 1,000 mg, pt family member brought 1,200MG bottle to facility for nursing to administer to pt. Nurse asking if the increase is ok, and if so please fax new order to: [phone]

## 2025-06-10 ENCOUNTER — TELEPHONE (OUTPATIENT)
Dept: ADMINISTRATIVE | Facility: OTHER | Age: 70
End: 2025-06-10

## 2025-06-10 DIAGNOSIS — E78.2 MIXED HYPERLIPIDEMIA: Primary | ICD-10-CM

## 2025-06-10 RX ORDER — AMOXICILLIN 500 MG
1200 CAPSULE ORAL DAILY
Qty: 90 CAPSULE | Refills: 3 | Status: SHIPPED | OUTPATIENT
Start: 2025-06-10

## 2025-06-10 NOTE — TELEPHONE ENCOUNTER
----- Message from Jessi Cloud MD sent at 6/9/2025 12:10 PM EDT -----  Regarding: missing study  Hello, our patient No patient name on file. has had Glomerular Filtration Rate (GFR) completed/performed on 2/11/2025. The document is located in pt's chart in the Labs section.Thank you,Jessi Cloud, [facility]

## 2025-06-10 NOTE — TELEPHONE ENCOUNTER
Upon review of the In Basket request we were able to locate, review, and update the patient chart as requested for eGFR.    Any additional questions or concerns should be emailed to the Practice Liaisons via the appropriate education email address, please do not reply via In Basket.    Thank you  Nenita Kendrick MA   [facility]

## 2025-06-20 ENCOUNTER — TELEPHONE (OUTPATIENT)
Age: 70
End: 2025-06-20

## 2025-06-20 NOTE — TELEPHONE ENCOUNTER
Caroline, medication tech at [facility] family mails in patient vitamins. Patient was previously getting mens mature multivitamin, but they mailed in regular multivitamins this time. Requesting an order be faxed to them at [phone] for regular mens multivitamins. Callback number [phone]

## 2025-06-28 ENCOUNTER — HOSPITAL ENCOUNTER (EMERGENCY)
Facility: HOSPITAL | Age: 70
Discharge: HOME | End: 2025-06-29
Attending: EMERGENCY MEDICINE
Payer: MEDICARE

## 2025-06-28 ENCOUNTER — APPOINTMENT (EMERGENCY)
Dept: CT IMAGING | Facility: HOSPITAL | Age: 70
End: 2025-06-28
Payer: MEDICARE

## 2025-06-28 VITALS
HEART RATE: 80 BPM | WEIGHT: 180.34 LBS | BODY MASS INDEX: 24.46 KG/M2 | TEMPERATURE: 98.1 F | RESPIRATION RATE: 18 BRPM | SYSTOLIC BLOOD PRESSURE: 128 MMHG | OXYGEN SATURATION: 95 % | DIASTOLIC BLOOD PRESSURE: 83 MMHG

## 2025-06-28 DIAGNOSIS — D64.9 ANEMIA: ICD-10-CM
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
## 2025-06-28 DIAGNOSIS — S50.811A ABRASION OF RIGHT FOREARM, INITIAL ENCOUNTER: ICD-10-CM

## 2025-06-28 DIAGNOSIS — S09.90XA INJURY OF HEAD, INITIAL ENCOUNTER: ICD-10-CM

## 2025-06-28 DIAGNOSIS — S01.01XA LACERATION OF SCALP, INITIAL ENCOUNTER: ICD-10-CM

## 2025-06-28 LAB
ABO GROUP BLD: NORMAL
ALBUMIN SERPL BCG-MCNC: 4.3 G/DL (ref 3.5–5)
ALP SERPL-CCNC: 59 U/L (ref 34–104)
ALT SERPL W P-5'-P-CCNC: 28 U/L (ref 7–52)
ANION GAP SERPL CALCULATED.3IONS-SCNC: 7 MMOL/L (ref 4–13)
AST SERPL W P-5'-P-CCNC: 27 U/L (ref 13–39)
ATRIAL RATE: 77 BPM
BASOPHILS # BLD AUTO: 0.08 THOUSANDS/ÂΜL (ref 0–0.1)
BASOPHILS NFR BLD AUTO: 1 % (ref 0–1)
BILIRUB SERPL-MCNC: 0.23 MG/DL (ref 0.2–1)
BLD GP AB SCN SERPL QL: NEGATIVE
BUN SERPL-MCNC: 16 MG/DL (ref 5–25)
CALCIUM SERPL-MCNC: 9.5 MG/DL (ref 8.4–10.2)
CHLORIDE SERPL-SCNC: 101 MMOL/L (ref 96–108)
CO2 SERPL-SCNC: 25 MMOL/L (ref 21–32)
CREAT SERPL-MCNC: 0.79 MG/DL (ref 0.6–1.3)
EOSINOPHIL # BLD AUTO: 0.62 THOUSAND/ÂΜL (ref 0–0.61)
EOSINOPHIL NFR BLD AUTO: 8 % (ref 0–6)
ERYTHROCYTE [DISTWIDTH] IN BLOOD BY AUTOMATED COUNT: 15.1 % (ref 11.6–15.1)
GFR SERPL CREATININE-BSD FRML MDRD: 91 ML/MIN/1.73SQ M
GLUCOSE SERPL-MCNC: 99 MG/DL (ref 65–140)
HCT VFR BLD AUTO: 32.7 % (ref 36.5–49.3)
HGB BLD-MCNC: 10.3 G/DL (ref 12–17)
IMM GRANULOCYTES # BLD AUTO: 0.08 THOUSAND/UL (ref 0–0.2)
IMM GRANULOCYTES NFR BLD AUTO: 1 % (ref 0–2)
LYMPHOCYTES # BLD AUTO: 2.39 THOUSANDS/ÂΜL (ref 0.6–4.47)
LYMPHOCYTES NFR BLD AUTO: 29 % (ref 14–44)
MCH RBC QN AUTO: 26.3 PG (ref 26.8–34.3)
MCHC RBC AUTO-ENTMCNC: 31.5 G/DL (ref 31.4–37.4)
MCV RBC AUTO: 84 FL (ref 82–98)
MONOCYTES # BLD AUTO: 0.93 THOUSAND/ÂΜL (ref 0.17–1.22)
MONOCYTES NFR BLD AUTO: 11 % (ref 4–12)
NEUTROPHILS # BLD AUTO: 4.21 THOUSANDS/ÂΜL (ref 1.85–7.62)
NEUTS SEG NFR BLD AUTO: 50 % (ref 43–75)
NRBC BLD AUTO-RTO: 0 /100 WBCS
P AXIS: 70 DEGREES
PLATELET # BLD AUTO: 371 THOUSANDS/UL (ref 149–390)
PMV BLD AUTO: 9.6 FL (ref 8.9–12.7)
POTASSIUM SERPL-SCNC: 4.2 MMOL/L (ref 3.5–5.3)
PR INTERVAL: 170 MS
PROT SERPL-MCNC: 6.6 G/DL (ref 6.4–8.4)
QRS AXIS: 39 DEGREES
QRSD INTERVAL: 90 MS
QT INTERVAL: 388 MS
QTC INTERVAL: 439 MS
RBC # BLD AUTO: 3.91 MILLION/UL (ref 3.88–5.62)
RH BLD: POSITIVE
SODIUM SERPL-SCNC: 133 MMOL/L (ref 135–147)
SPECIMEN EXPIRATION DATE: NORMAL
T WAVE AXIS: 58 DEGREES
VENTRICULAR RATE: 77 BPM
WBC # BLD AUTO: 8.31 THOUSAND/UL (ref 4.31–10.16)

## 2025-06-28 PROCEDURE — 36415 COLL VENOUS BLD VENIPUNCTURE: CPT | Performed by: EMERGENCY MEDICINE

## 2025-06-28 PROCEDURE — 72125 CT NECK SPINE W/O DYE: CPT

## 2025-06-28 PROCEDURE — 86850 RBC ANTIBODY SCREEN: CPT | Performed by: EMERGENCY MEDICINE

## 2025-06-28 PROCEDURE — 99285 EMERGENCY DEPT VISIT HI MDM: CPT | Performed by: EMERGENCY MEDICINE

## 2025-06-28 PROCEDURE — 12001 RPR S/N/AX/GEN/TRNK 2.5CM/<: CPT | Performed by: EMERGENCY MEDICINE

## 2025-06-28 PROCEDURE — 99284 EMERGENCY DEPT VISIT MOD MDM: CPT

## 2025-06-28 PROCEDURE — 86901 BLOOD TYPING SEROLOGIC RH(D): CPT | Performed by: EMERGENCY MEDICINE

## 2025-06-28 PROCEDURE — 85025 COMPLETE CBC W/AUTO DIFF WBC: CPT | Performed by: EMERGENCY MEDICINE

## 2025-06-28 PROCEDURE — 93005 ELECTROCARDIOGRAM TRACING: CPT

## 2025-06-28 PROCEDURE — 70450 CT HEAD/BRAIN W/O DYE: CPT

## 2025-06-28 PROCEDURE — 80053 COMPREHEN METABOLIC PANEL: CPT | Performed by: EMERGENCY MEDICINE

## 2025-06-28 PROCEDURE — 86900 BLOOD TYPING SEROLOGIC ABO: CPT | Performed by: EMERGENCY MEDICINE

## 2025-06-28 RX ORDER — GINSENG 100 MG
1 CAPSULE ORAL ONCE
Status: COMPLETED | OUTPATIENT
Start: 2025-06-28 | End: 2025-06-28

## 2025-06-28 RX ORDER — LIDOCAINE HYDROCHLORIDE 10 MG/ML
5 INJECTION, SOLUTION EPIDURAL; INFILTRATION; INTRACAUDAL; PERINEURAL ONCE
Status: COMPLETED | OUTPATIENT
Start: 2025-06-28 | End: 2025-06-28

## 2025-06-28 RX ADMIN — LIDOCAINE HYDROCHLORIDE 5 ML: 10 INJECTION, SOLUTION EPIDURAL; INFILTRATION; INTRACAUDAL at 21:20

## 2025-06-28 RX ADMIN — BACITRACIN 1 SMALL APPLICATION: 500 OINTMENT TOPICAL at 21:20

## 2025-06-28 RX ADMIN — BACITRACIN 1 SMALL APPLICATION: 500 OINTMENT TOPICAL at 22:31

## 2025-07-01 ENCOUNTER — TELEPHONE (OUTPATIENT)
Age: 70
End: 2025-07-01

## 2025-07-01 ENCOUNTER — TRANSITIONAL CARE MANAGEMENT (OUTPATIENT)
Dept: FAMILY MEDICINE CLINIC | Facility: HOSPITAL | Age: 70
End: 2025-07-01

## 2025-07-01 NOTE — TELEPHONE ENCOUNTER
Patient is discharged from the hospital over the weekend. Needs TCM. Tried reaching out to the office.  Please call to schedule.  Thank you!!

## 2025-07-02 NOTE — TELEPHONE ENCOUNTER
Pt does not need a TCM as he was only in the ER on 6/28 for 9 hours, no admission.     Pt is scheduled for ER follow up tomorrow and to have the stitches removed from his head.

## 2025-07-03 ENCOUNTER — OFFICE VISIT (OUTPATIENT)
Dept: FAMILY MEDICINE CLINIC | Facility: HOSPITAL | Age: 70
End: 2025-07-03
Payer: MEDICARE

## 2025-07-03 VITALS
BODY MASS INDEX: 23.33 KG/M2 | DIASTOLIC BLOOD PRESSURE: 68 MMHG | WEIGHT: 172 LBS | OXYGEN SATURATION: 97 % | HEART RATE: 104 BPM | SYSTOLIC BLOOD PRESSURE: 110 MMHG

## 2025-07-03 DIAGNOSIS — Z48.02 ENCOUNTER FOR STAPLE REMOVAL: ICD-10-CM

## 2025-07-03 DIAGNOSIS — S01.01XD SCALP LACERATION, SUBSEQUENT ENCOUNTER: Primary | ICD-10-CM

## 2025-07-03 LAB
ATRIAL RATE: 77 BPM
P AXIS: 70 DEGREES
PR INTERVAL: 170 MS
QRS AXIS: 39 DEGREES
QRSD INTERVAL: 90 MS
QT INTERVAL: 388 MS
QTC INTERVAL: 439 MS
T WAVE AXIS: 58 DEGREES
VENTRICULAR RATE: 77 BPM

## 2025-07-03 PROCEDURE — 93010 ELECTROCARDIOGRAM REPORT: CPT | Performed by: INTERNAL MEDICINE

## 2025-07-03 PROCEDURE — G2211 COMPLEX E/M VISIT ADD ON: HCPCS | Performed by: INTERNAL MEDICINE

## 2025-07-03 PROCEDURE — 99213 OFFICE O/P EST LOW 20 MIN: CPT | Performed by: INTERNAL MEDICINE

## 2025-07-03 RX ORDER — QUETIAPINE FUMARATE 25 MG/1
25 TABLET, FILM COATED ORAL
COMMUNITY
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
## 2025-07-09 ENCOUNTER — TELEPHONE (OUTPATIENT)
Age: 70
End: 2025-07-09

## 2025-07-15 ENCOUNTER — EVALUATION (OUTPATIENT)
Facility: CLINIC | Age: 70
End: 2025-07-15
Attending: EMERGENCY MEDICINE
Payer: MEDICARE

## 2025-07-15 DIAGNOSIS — S09.90XA INJURY OF HEAD, INITIAL ENCOUNTER: ICD-10-CM
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
## 2025-07-15 PROCEDURE — 97162 PT EVAL MOD COMPLEX 30 MIN: CPT

## 2025-07-15 PROCEDURE — 97112 NEUROMUSCULAR REEDUCATION: CPT

## 2025-07-17 ENCOUNTER — OFFICE VISIT (OUTPATIENT)
Dept: FAMILY MEDICINE CLINIC | Facility: HOSPITAL | Age: 70
End: 2025-07-17
Payer: MEDICARE

## 2025-07-17 VITALS
RESPIRATION RATE: 16 BRPM | HEIGHT: 72 IN | OXYGEN SATURATION: 98 % | HEART RATE: 77 BPM | WEIGHT: 173.4 LBS | DIASTOLIC BLOOD PRESSURE: 66 MMHG | SYSTOLIC BLOOD PRESSURE: 114 MMHG | BODY MASS INDEX: 23.49 KG/M2

## 2025-07-17 DIAGNOSIS — E53.8 B12 DEFICIENCY: ICD-10-CM

## 2025-07-17 DIAGNOSIS — E11.9 TYPE 2 DIABETES MELLITUS WITHOUT COMPLICATION, WITHOUT LONG-TERM CURRENT USE OF INSULIN (HCC): ICD-10-CM

## 2025-07-17 DIAGNOSIS — Z00.00 MEDICARE ANNUAL WELLNESS VISIT, SUBSEQUENT: ICD-10-CM

## 2025-07-17 DIAGNOSIS — D50.9 IRON DEFICIENCY ANEMIA, UNSPECIFIED IRON DEFICIENCY ANEMIA TYPE: Primary | ICD-10-CM

## 2025-07-17 DIAGNOSIS — Z12.5 SCREENING FOR PROSTATE CANCER: ICD-10-CM

## 2025-07-17 DIAGNOSIS — F17.210 SMOKING GREATER THAN 20 PACK YEARS: ICD-10-CM

## 2025-07-17 PROBLEM — J04.0 LARYNGITIS: Status: RESOLVED | Noted: 2025-01-31 | Resolved: 2025-07-17

## 2025-07-17 PROCEDURE — G0439 PPPS, SUBSEQ VISIT: HCPCS | Performed by: INTERNAL MEDICINE

## 2025-07-17 PROCEDURE — 99213 OFFICE O/P EST LOW 20 MIN: CPT | Performed by: INTERNAL MEDICINE

## 2025-07-17 PROCEDURE — G2211 COMPLEX E/M VISIT ADD ON: HCPCS | Performed by: INTERNAL MEDICINE

## 2025-07-17 NOTE — PROGRESS NOTES
Name: Bryce Silver      : 1955      MRN: [id number]  Encounter Provider: Jessi Cloud MD  Encounter Date: 2025   Encounter department: [facility] [address]  :  Assessment & Plan  Iron deficiency anemia, unspecified iron deficiency anemia type  Patient has worsening anemia on review of his chart from February and .  Indices are tending towards microcytic.  He denies signs of GI or  bleeding.    He had a bad experience during last colonoscopy and adamantly refuses to have any more colonoscopies.    Abdomen was soft and nontender, I felt no masses.    I will check his hemoglobin as well as iron studies      Orders:  •  CBC and differential; Future  •  Iron Panel (Includes Ferritin, Iron Sat%, Iron, and TIBC); Future    B12 deficiency  He is on metformin, he is at risk for B12 deficiency I will check his B12 folic acid level  Orders:  •  Vitamin B12; Future  •  Folate; Future    Type 2 diabetes mellitus without complication, without long-term current use of insulin ([facility])    Lab Results   Component Value Date    HGBA1C 6.3 2025     He has type 2 diabetes.  He is on metformin.  He is due for A1c check.  Monofilament testing of the feet was normal today  Orders:  •  Hemoglobin A1C; Future  •  Albumin / creatinine urine ratio; Future  •  Lipid panel; Future    Medicare annual wellness visit, subsequent         Smoking greater than 20 pack years  He does not want to quit smoking, he would rather die  Orders:  •  CT Lung Screening Program; Future    Screening for prostate cancer    Orders:  •  PSA, Total Screen; Future      Depression Screening and Follow-up Plan: Patient was screened for depression during today's encounter. They screened negative with a PHQ-9 score of 0.      Falls Plan of Care: balance, strength, and gait training instructions were provided.     Tobacco Cessation Counseling: Tobacco cessation counseling was provided. The patient is sincerely urged to  quit consumption of tobacco. He is not ready to quit tobacco. Medication options discussed.       Preventive health issues were discussed with patient, and age appropriate screening tests were ordered as noted in patient's After Visit Summary. Personalized health advice and appropriate referrals for health education or preventive services given if needed, as noted in patient's After Visit Summary.    History of Present Illness     HPI   Patient Care Team:  Jessi Cloud MD as PCP - General (Internal Medicine)    Review of Systems  Medical History Reviewed by provider this encounter:  Tobacco  Allergies  Meds  Problems  Med Hx  Surg Hx  Fam Hx       Annual Wellness Visit Questionnaire   Bryce is here for his Subsequent Wellness visit.     Health Risk Assessment:   Patient rates overall health as excellent. Patient feels that their physical health rating is much worse. Patient is satisfied with their life. Eyesight was rated as slightly better. Hearing was rated as much worse. Patient feels that their emotional and mental health rating is slightly better. Patients states they are never, rarely angry. Patient states they are always unusually tired/fatigued. Pain experienced in the last 7 days has been some. Patient's pain rating has been 9/10. Patient states that he has experienced weight loss or gain in last 6 months.     Depression Screening:   PHQ-9 Score: 0      Fall Risk Screening:   In the past year, patient has experienced: history of falling in past year    Number of falls: 2 or more  Injured during fall?: Yes    Feels unsteady when standing or walking?: Yes    Worried about falling?: Yes      Home Safety:  Patient has trouble with stairs inside or outside of their home. Patient has working smoke alarms and has working carbon monoxide detector. Home safety hazards include: none.     Nutrition:   Current diet is Regular and Frequent junk food.     Medications:   Patient is currently taking over-the-counter  supplements. OTC medications include: see medication list. Patient is not able to manage medications.     Activities of Daily Living (ADLs)/Instrumental Activities of Daily Living (IADLs):   Walk and transfer into and out of bed and chair?: Yes  Dress and groom yourself?: Yes    Bathe or shower yourself?: Yes    Feed yourself? Yes  Do your laundry/housekeeping?: No  Manage your money, pay your bills and track your expenses?: No  Make your own meals?: No    Do your own shopping?: No    Previous Hospitalizations:   Any hospitalizations or ED visits within the last 12 months?: Yes    How many hospitalizations have you had in the last year?: 3-4    Advance Care Planning:   Living will: No    Advanced directive counseling given: Yes      Preventive Screenings      Cardiovascular Screening:    General: Screening Not Indicated, History Lipid Disorder and Risks and Benefits Discussed    Due for: Lipid Panel      Diabetes Screening:     General: Screening Not Indicated, History Diabetes and Risks and Benefits Discussed    Due for: Blood Glucose      Colorectal Cancer Screening:     General: Screening Current, Risks and Benefits Discussed and Patient Declines      Prostate Cancer Screening:    General: Risks and Benefits Discussed    Due for: PSA      Abdominal Aortic Aneurysm (AAA) Screening:    Risk factors include: age between 65-74 yo and tobacco use        Lung Cancer Screening:     General: Screening Current and Risks and Benefits Discussed    Due for: Low Dose CT (LDCT)      Hepatitis C Screening:    General: Screening Current      Preventive Screening Comments: Pt adamantly refuses colonoscopy, colon cancer screening due to bad experience during last colonoscopy.    Immunizations:  - Immunizations due: Zoster (Shingrix)  - Risks/benefits immunizations discussed      Screening, Brief Intervention, and Referral to Treatment (SBIRT)     Screening  Typical number of drinks in a day: 0  Typical number of drinks in a week:  0  Interpretation: Low risk drinking behavior.    Brief Intervention  Alcohol & drug use screenings were reviewed. No concerns regarding substance use disorder identified.     Other Counseling Topics:   Regular weightbearing exercise.     Social Drivers of Health     Food Insecurity: No Food Insecurity (7/17/2025)    Nursing - Inadequate Food Risk Classification    • Worried About Running Out of Food in the Last Year: Never true    • Ran Out of Food in the Last Year: Never true   Transportation Needs: No Transportation Needs (7/17/2025)    PRAPARE - Transportation    • Lack of Transportation (Medical): No    • Lack of Transportation (Non-Medical): No   Housing Stability: Low Risk  (7/17/2025)    Housing Stability Vital Sign    • Unable to Pay for Housing in the Last Year: No    • Number of Times Moved in the Last Year: 0    • Homeless in the Last Year: No   Utilities: Not At Risk (7/17/2025)    [facility] Utilities    • Threatened with loss of utilities: No     No results found.    Objective   /66   Pulse 77   Resp 16   Ht 6' (1.829 m)   Wt 78.7 kg (173 lb 6.4 oz)   SpO2 98%   BMI 23.52 kg/m²     Physical Exam  Constitutional:       General: He is not in acute distress.     Appearance: He is well-developed. He is not toxic-appearing.   HENT:      Head: Normocephalic.      Right Ear: There is impacted cerumen.      Left Ear: Tympanic membrane normal. There is no impacted cerumen.      Mouth/Throat:      Mouth: Mucous membranes are moist.     Eyes:      Conjunctiva/sclera: Conjunctivae normal.       Cardiovascular:      Rate and Rhythm: Normal rate and regular rhythm.      Pulses: no weak pulses.           Posterior tibial pulses are 2+ on the right side and 2+ on the left side.      Heart sounds: No murmur heard.     No gallop.   Pulmonary:      Effort: No respiratory distress.      Breath sounds: No wheezing or rales.   Abdominal:      General: Bowel sounds are normal. There is no distension.      Palpations:  Abdomen is soft. There is no mass.      Tenderness: There is no abdominal tenderness.     Musculoskeletal:      Cervical back: Neck supple.   Feet:      Right foot:      Skin integrity: No ulcer.      Left foot:      Skin integrity: No ulcer.     Skin:     General: Skin is warm and dry.     Neurological:      Mental Status: He is alert.      Cranial Nerves: No cranial nerve deficit.      Motor: No weakness.     Psychiatric:         Mood and Affect: Mood normal.     Diabetic Foot Exam    Patient's shoes and socks removed.    Right Foot/Ankle   Right Foot Inspection  Skin Exam: No ulcer.     Toe Exam: Erythema    Sensory   Monofilament testing: intact    Vascular  The right PT pulse is 2+.     Left Foot/Ankle  Left Foot Inspection  Skin Exam: No ulcer.     Toe Exam: No erythema.     Sensory   Monofilament testing: intact    Vascular  The left PT pulse is 2+.     Assign Risk Category  No deformity present  No loss of protective sensation  No weak pulses  Risk: 0

## 2025-07-17 NOTE — ASSESSMENT & PLAN NOTE
Lab Results   Component Value Date    HGBA1C 6.3 02/11/2025     He has type 2 diabetes.  He is on metformin.  He is due for A1c check.  Monofilament testing of the feet was normal today  Orders:  •  Hemoglobin A1C; Future  •  Albumin / creatinine urine ratio; Future  •  Lipid panel; Future

## 2025-07-29 LAB — HBA1C MFR BLD HPLC: 6.3 %

## 2025-07-30 DIAGNOSIS — D50.0 IRON DEFICIENCY ANEMIA DUE TO CHRONIC BLOOD LOSS: Primary | ICD-10-CM

## 2025-07-30 RX ORDER — FERROUS SULFATE 324(65)MG
324 TABLET, DELAYED RELEASE (ENTERIC COATED) ORAL
Qty: 30 TABLET | Refills: 3 | Status: SHIPPED | OUTPATIENT
Start: 2025-07-30 | End: 2025-10-28

## 2025-07-31 ENCOUNTER — TELEPHONE (OUTPATIENT)
Dept: FAMILY MEDICINE CLINIC | Facility: HOSPITAL | Age: 70
End: 2025-07-31

## 2025-08-07 ENCOUNTER — TELEPHONE (OUTPATIENT)
Dept: FAMILY MEDICINE CLINIC | Facility: HOSPITAL | Age: 70
End: 2025-08-07

## 2025-08-07 DIAGNOSIS — G44.309 POST-TRAUMATIC HEADACHE, NOT INTRACTABLE, UNSPECIFIED CHRONICITY PATTERN: ICD-10-CM

## 2025-08-08 RX ORDER — ACETAMINOPHEN 500 MG
500 TABLET ORAL EVERY 6 HOURS PRN
Qty: 90 TABLET | Refills: 1 | Status: SHIPPED | OUTPATIENT
Start: 2025-08-08

## (undated) DEVICE — ANTIBACTERIAL UNDYED BRAIDED (POLYGLACTIN 910), SYNTHETIC ABSORBABLE SUTURE: Brand: COATED VICRYL

## (undated) DEVICE — SUT PROLENE 2-0 CT-2 30 IN 8411H

## (undated) DEVICE — MEDI-VAC YANKAUER SUCTION HANDLE W/BULBOUS AND CONTROL VENT: Brand: CARDINAL HEALTH

## (undated) DEVICE — GLOVE SRG BIOGEL 6.5

## (undated) DEVICE — NEEDLE HYPO 23G X 1-1/2 IN

## (undated) DEVICE — BETHLEHEM UNIVERSAL MINOR GEN: Brand: CARDINAL HEALTH

## (undated) DEVICE — CHLORAPREP HI-LITE 26ML ORANGE

## (undated) DEVICE — GLOVE INDICATOR PI UNDERGLOVE SZ 6.5 BLUE

## (undated) DEVICE — DRAPE EQUIPMENT RF WAND

## (undated) DEVICE — GLOVE SRG BIOGEL ECLIPSE 8

## (undated) DEVICE — EXOFIN PRECISION PEN HIGH VISCOSITY TOPICAL SKIN ADHESIVE: Brand: EXOFIN PRECISION PEN, 1G

## (undated) DEVICE — GLOVE INDICATOR PI UNDERGLOVE SZ 8 BLUE

## (undated) DEVICE — ELECTRODE BLADE MOD E-Z CLEAN  2.75IN 7CM -0012AM

## (undated) DEVICE — INTENDED FOR TISSUE SEPARATION, AND OTHER PROCEDURES THAT REQUIRE A SHARP SURGICAL BLADE TO PUNCTURE OR CUT.: Brand: BARD-PARKER SAFETY BLADES SIZE 15, STERILE

## (undated) DEVICE — GLOVE SRG BIOGEL 8

## (undated) DEVICE — SUT MONOCRYL 4-0 PS-2 27 IN Y426H

## (undated) DEVICE — NEPTUNE E-SEP SMOKE EVACUATION PENCIL, COATED, 70MM BLADE, PUSH BUTTON SWITCH: Brand: NEPTUNE E-SEP

## (undated) DEVICE — TUBING SUCTION 5MM X 12 FT